# Patient Record
Sex: FEMALE | Race: WHITE
[De-identification: names, ages, dates, MRNs, and addresses within clinical notes are randomized per-mention and may not be internally consistent; named-entity substitution may affect disease eponyms.]

---

## 2018-09-02 ENCOUNTER — HOSPITAL ENCOUNTER (EMERGENCY)
Dept: HOSPITAL 75 - ER | Age: 63
Discharge: HOME | End: 2018-09-02
Payer: SELF-PAY

## 2018-09-02 VITALS — SYSTOLIC BLOOD PRESSURE: 119 MMHG | DIASTOLIC BLOOD PRESSURE: 67 MMHG

## 2018-09-02 VITALS — WEIGHT: 130 LBS | BODY MASS INDEX: 26.21 KG/M2 | HEIGHT: 59 IN

## 2018-09-02 DIAGNOSIS — Z90.89: ICD-10-CM

## 2018-09-02 DIAGNOSIS — Z90.710: ICD-10-CM

## 2018-09-02 DIAGNOSIS — Z88.0: ICD-10-CM

## 2018-09-02 DIAGNOSIS — Z87.01: ICD-10-CM

## 2018-09-02 DIAGNOSIS — F17.210: ICD-10-CM

## 2018-09-02 DIAGNOSIS — R10.31: Primary | ICD-10-CM

## 2018-09-02 DIAGNOSIS — Z98.890: ICD-10-CM

## 2018-09-02 DIAGNOSIS — G89.29: ICD-10-CM

## 2018-09-02 DIAGNOSIS — F41.9: ICD-10-CM

## 2018-09-02 DIAGNOSIS — Z87.19: ICD-10-CM

## 2018-09-02 LAB
ALBUMIN SERPL-MCNC: 4.6 GM/DL (ref 3.2–4.5)
ALP SERPL-CCNC: 92 U/L (ref 40–136)
ALT SERPL-CCNC: 18 U/L (ref 0–55)
BASOPHILS # BLD AUTO: 0 10^3/UL (ref 0–0.1)
BASOPHILS NFR BLD AUTO: 0 % (ref 0–10)
BILIRUB SERPL-MCNC: 0.6 MG/DL (ref 0.1–1)
BUN/CREAT SERPL: 16
CALCIUM SERPL-MCNC: 9.7 MG/DL (ref 8.5–10.1)
CHLORIDE SERPL-SCNC: 106 MMOL/L (ref 98–107)
CO2 SERPL-SCNC: 22 MMOL/L (ref 21–32)
CREAT SERPL-MCNC: 0.8 MG/DL (ref 0.6–1.3)
EOSINOPHIL # BLD AUTO: 0.4 10^3/UL (ref 0–0.3)
EOSINOPHIL NFR BLD AUTO: 7 % (ref 0–10)
ERYTHROCYTE [DISTWIDTH] IN BLOOD BY AUTOMATED COUNT: 12.9 % (ref 10–14.5)
GFR SERPLBLD BASED ON 1.73 SQ M-ARVRAT: > 60 ML/MIN
GLUCOSE SERPL-MCNC: 85 MG/DL (ref 70–105)
HCT VFR BLD CALC: 40 % (ref 35–52)
HGB BLD-MCNC: 13.8 G/DL (ref 11.5–16)
LYMPHOCYTES # BLD AUTO: 1.6 X 10^3 (ref 1–4)
LYMPHOCYTES NFR BLD AUTO: 29 % (ref 12–44)
MANUAL DIFFERENTIAL PERFORMED BLD QL: NO
MCH RBC QN AUTO: 33 PG (ref 25–34)
MCHC RBC AUTO-ENTMCNC: 34 G/DL (ref 32–36)
MCV RBC AUTO: 97 FL (ref 80–99)
MONOCYTES # BLD AUTO: 0.4 X 10^3 (ref 0–1)
MONOCYTES NFR BLD AUTO: 7 % (ref 0–12)
NEUTROPHILS # BLD AUTO: 3.3 X 10^3 (ref 1.8–7.8)
NEUTROPHILS NFR BLD AUTO: 57 % (ref 42–75)
PLATELET # BLD: 197 10^3/UL (ref 130–400)
PMV BLD AUTO: 9 FL (ref 7.4–10.4)
POTASSIUM SERPL-SCNC: 4.1 MMOL/L (ref 3.6–5)
PROT SERPL-MCNC: 7.6 GM/DL (ref 6.4–8.2)
RBC # BLD AUTO: 4.16 10^6/UL (ref 4.35–5.85)
SODIUM SERPL-SCNC: 137 MMOL/L (ref 135–145)
WBC # BLD AUTO: 5.7 10^3/UL (ref 4.3–11)

## 2018-09-02 PROCEDURE — 36415 COLL VENOUS BLD VENIPUNCTURE: CPT

## 2018-09-02 PROCEDURE — 74177 CT ABD & PELVIS W/CONTRAST: CPT

## 2018-09-02 PROCEDURE — 80053 COMPREHEN METABOLIC PANEL: CPT

## 2018-09-02 PROCEDURE — 85025 COMPLETE CBC W/AUTO DIFF WBC: CPT

## 2018-09-02 NOTE — XMS REPORT
[facility]

 Created on: 2018



Sofiya Singh

External Reference #: [id number]

: 1955

Sex: Female



Demographics







 Address  [address] [place]  [address]

 

 Preferred Language  Unknown

 

 Marital Status  Unknown

 

 Anglican Affiliation  Unknown

 

 Race  Unknown

 

 Ethnic Group  Unknown





Author







 Author  NAHUN SNYDER

 

 Organization  [facility]

 

 Address  [address] [place]  [address]



 

 Phone  [phone]







Care Team Providers







 Care Team Member Name  Role  Phone

 

 NAHUN SNYDER  Unavailable  [phone]







PROBLEMS







 Type  Condition  ICD9-CM Code  KCC05-VK Code  Onset Dates  Condition Status  
SNOMED Code

 

 Problem  ADHD (attention deficit hyperactivity disorder), inattentive type     
F90.0     Active  32684303

 

 Problem  Arthritis     M19.90     Active  8866237

 

 Problem  Positive skin test for tuberculosis     R76.11     Active  243485624

 

 Problem  Lumbago with sciatica, left side     M54.42     Active  752938470

 

 Problem  Excessive daytime sleepiness     G47.19     Active  491260900623

 

 Problem  Primary narcolepsy without cataplexy     G47.419     Active  
[id number]

 

 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 
    Active  [id number]







ALLERGIES

No Information



ENCOUNTERS







 Encounter  Location  Date  Diagnosis

 

 [place] [id number] [place] [address] 45059-
7151    Right lower quadrant abdominal pain R10.31 and Rash R21

 

 [place] [id number] [place] [address] 88519-
4035  14 Mar, 2018  ADHD (attention deficit hyperactivity disorder), 
inattentive type F90.0 ; Lumbago with sciatica, left side M54.42 and Arthritis 
M19.90

 

 [facility]  [address] [place] [address] 02328-
1846    ADHD (attention deficit hyperactivity disorder), 
inattentive type F90.0 and Lumbar neuritis M54.16

 

 [facility]  [id number] [place] [address] 30753-
9937     

 

 [place] [id number] [place] [address] 90752-
4188    Lumbar neuritis M54.16

 

 [facility]  [address] [place] [address] 95497-
3147  16 2018  Low back pain M54.5

 

 [facility]  [id number] [place] [address] 27133-
7798  11 2018  ADHD (attention deficit hyperactivity disorder), 
inattentive type F90.0

 

 [facility]  [id number] [place] [address] 36769-
6257  09 2018  Positive skin test for tuberculosis R76.11

 

 [facility]  [id number] [place] [address] 34270-
0024    Positive skin test for tuberculosis R76.11

 

 [facility]  [id number] [place] [address] 62957-
0370     

 

 [facility]  [id number] [place] [address] 82657-
3317    Lumbar neuritis M54.16

 

 [facility]  [id number] [place] [address] 28066-
2606    ADHD (attention deficit hyperactivity disorder), 
inattentive type F90.0

 

 [facility]  [address] [place] [address] 98679-
4666     

 

 [facility]  [address] [place] [address] 64523-
3777  20 Dec, 2017  Lumbar neuritis M54.16

 

 [facility]  [id number] [place] [address] 91570-
3680  15 Dec, 2017  ADHD (attention deficit hyperactivity disorder), 
inattentive type F90.0

 

 [facility]  [id number] [place] [address] 93560-
7890  12 Dec, 2017   

 

 [facility]  [id number] [place] [address] 78823-
8298  11 Dec, 2017   

 

 [facility]  [id number] [place] [address] 69347-
3005    Lumbar neuritis M54.16

 

 [facility]  [id number] [place] [address] 46607-
3327    ADHD (attention deficit hyperactivity disorder), 
inattentive type F90.0 and Primary narcolepsy without cataplexy G47.419

 

 [facility]  [id number] [place] [address] 63869-
8044  10 Nov, 2017   

 

 [facility]  [id number] [place] [address] 80145-
5462     

 

 [facility]  [id number] [place] [address] 26718-
7826  23 Oct, 2017  Lumbar neuritis M54.16 and ADHD (attention deficit 
hyperactivity disorder), inattentive type F90.0

 

 [facility]  [id number] [place] [address] 61955-
9872  12 Oct, 2017   

 

 [facility]  [id number] [place] [address] 28895-
1354  26 Sep, 2017  Lumbar neuritis M54.16 and ADHD (attention deficit 
hyperactivity disorder), inattentive type F90.0

 

 [facility]  [id number] [place] [address] 33704-
3591  19 Sep, 2017   

 

 [facility]  [id number] [place] [address] 76858-
9518  31 Aug, 2017  ADHD (attention deficit hyperactivity disorder), 
inattentive type F90.0 and Lumbar neuritis M54.16

 

 [facility]  [id number] [place] [address] 62218-
5687  24 Aug, 2017  Lumbar neuritis M54.16

 

 [facility]  [id number] [place] [address] 37452-
1331  23 Aug, 2017   

 

 [facility]  [id number] [place] [address] 03077-
0109  02 Aug, 2017  ADHD (attention deficit hyperactivity disorder), 
inattentive type F90.0 and Lumbar neuritis M54.16

 

 [facility]  [address] [address]-
9295  02 Aug, 2017   

 

 [facility]  [address] [address]-
5445     

 

 [facility]  [address] [address]-
4743     

 

 [facility]  [id number] [place] [address] 33232-
6069     

 

 [facility]  [id number] [place] [address] 01664-
5935     

 

 [facility]  [id number] [place] [address] 43376-
0779    ADHD (attention deficit hyperactivity disorder), 
inattentive type F90.0 and Lumbar neuritis M54.16

 

 [facility]  [id number] [place] [address] 98166-
2991     

 

 [facility]  [address] [address]-
1636     

 

 [facility]  [id number] [place] [address] 56778-
9095    Lumbar neuritis M54.16 and ADHD (attention deficit 
hyperactivity disorder), inattentive type F90.0

 

 [facility]  [address] [address]-
2223     

 

 [facility]  [address] [address]-
7253  10 May, 2017  Lumbar neuritis M54.16 and ADHD (attention deficit 
hyperactivity disorder), inattentive type F90.0

 

 [facility]  [address] [address]-
6946  03 May, 2017   

 

 [facility]  [address] [address]-
4751  01 May, 2017   

 

 [facility]  [address] [place] [address] 11438-
0850    Narcolepsy due to underlying condition without cataplexy 
G47.429 and Lumbago with sciatica, left side M54.42

 

 [facility]  [id number] [place] [address] 15720-
0501  14 2017  Lumbar neuritis M54.16 and ADHD (attention deficit 
hyperactivity disorder), inattentive type F90.0

 

 [facility]  [id number] [place] [address] 28082-
4145  31 Mar, 2017   

 

 [facility]  [id number] [place] [address] 07439-
5483  15 Mar, 2017  Lumbar neuritis M54.16 and ADHD (attention deficit 
hyperactivity disorder), inattentive type F90.0

 

 [facility]  [id number] [place] [address] 29482-
0593  15 Mar, 2017   

 

 [facility]  [id number] [place] [address] 22074-
6319  06 Mar, 2017   

 

 [facility]  [id number] [place] [address] 02506-
3595  15 2017  ADHD (attention deficit hyperactivity disorder), 
inattentive type F90.0

 

 [facility]  [id number] [place] [address] 76023-
5233  15 2017  Lumbar neuritis M54.16

 

 [facility]  [id number] [place] [address] 10287-
3754  08 2017   

 

 [facility]  [id number] [place] [address] 66261-
6470  07 2017   

 

 [facility]  [id number] [place] [address] 07985-
0994    Attention deficit hyperactivity disorder (ADHD), 
predominantly inattentive type F90.0

 

 [facility]  [id number] [place] [address] 08866-
3188     

 

 [facility]  [address] [place] [address]-
3740  10 Niall, 2017   

 

 [facility]  [id number] [place] [address] 46033-
4473     

 

 [facility]  [id number] [place] [address] 41031-
2489  22 Dec, 2016  Attention deficit hyperactivity disorder (ADHD), 
predominantly inattentive type F90.0

 

 [facility]  [id number] [place] [address] 06103-
1893  12 Dec, 2016   

 

 [facility]  [address] [place] [address] 70657-
6029  07 Dec, 2016   

 

 [facility]  [id number] [place] [address] 85344-
0182  05 Dec, 2016   

 

 [facility]  [id number] [place] [address] 96396-
3213  05 Dec, 2016  Low TSH level R94.6

 

 [facility]  [address] [address]-
0785  05 Dec, 2016   

 

 [facility]  [address] [place] [address] 02695-
0264  02 Dec, 2016   

 

 [facility]  [id number] [place] [address] 42583-
4557  10 Nov, 2016   

 

 [facility]  [address] [address]-
9352  10 Nov, 2016   

 

 [facility]  [address] [address]-
8411     

 

 [facility]  [address], [place] [address]-
6684  18 Oct, 2016  Low TSH level R94.6

 

 [facility]  [id number] [place] [address] 62497-
6202  17 Oct, 2016  Excessive daytime sleepiness G47.19 and ADHD (attention 
deficit hyperactivity disorder), inattentive type F90.0

 

 [facility]  [id number] [address]-
2832  13 Oct, 2016   

 

 [facility]  [address] [facility] [id number] [place] [address]-
5449  12 Oct, 2016   

 

 [facility] [place] [facility]  [address] [facility] [id number] [place] [address]-
1812  03 Oct, 2016   

 

 [facility]  [address] [facility] [id number] [place] [address]-
4624  28 Sep, 2016   

 

 [facility] [place] [facility]  [address] [facility] [id number] [place] [address]-
6591  14 Sep, 2016   

 

 [facility]  [address] [facility] [id number] [place] [address], [place] [address]-
1618  01 Sep, 2016   

 

 [facility] [place] [facility]  [id number] [address]-
1529  17 Aug, 2016   

 

 [facility]  [address] [place] [address], [place] [address]-
0061  04 Aug, 2016   

 

 [facility]  [address] [facility] [id number] [place] [address]-
0156  03 Aug, 2016   

 

 [facility]  [address] [place] [address], [place] [address]-
1968    Lumbar neuritis M54.16

 

 [facility]  [address], [place] [address]-
1753     

 

 [facility] [place] [facility]  [address] [address]-
6446     

 

 [facility] [place] [facility]  [address] [facility] [id number] [address]-
4484    Lumbar neuritis M54.16

 

 [facility]  [address] [facility] [id number] [place] [address]-
0565     

 

 [facility] [place] [facility]  [id number] [address]-
4519     

 

 [facility] [place] [facility]  [address] [address]-
5578  26 May, 2016  Lumbar neuritis M54.16

 

 [facility]  [address] [address]-
4270  25 May, 2016   

 

 [facility]  [id number] [address]-
0726  10 May, 2016   

 

 [facility]  [address] [place] [address] 90082-
5760    Lumbar neuritis M54.16

 

 [facility]  [address], [place] [address]-
8505     

 

 [facility]  [id number] [place] [address] 37932-
4479     

 

 [facility]  [address] [place] [address] 03850-
4082  23 Mar, 2016   

 

 [facility]  [id number] [place] [address] 58483-
9480  14 Mar, 2016   

 

 [facility]  [address] [address]-
3951  14 Mar, 2016   

 

 [facility]  [address] [place] [address] 67111-
1894  11 Mar, 2016   

 

 [facility]  [address], [place] [address]-
3224  24 2016   

 

 [facility]  [address] [address]-
9354    Inguinal hernia, right K40.90

 

 [facility]  [address] [address]-
4135  17 2016   

 

 [facility]  [address] [place] [address] 41659-
2143  15 2016  Low back pain M54.5 and Sciatica, unspecified side M54.30

 

 [facility]  [address], [place] [address]-
2747     

 

 [facility] [place] [facility]  [address] [facility] [id number] [address]-
2632     

 

 [facility] [place] [facility]  [address] [facility] [id number] [place] [address], [place] [address]-
5090  30 Dec, 2015   

 

 [facility] [place] [facility]  [address] [facility] [id number] [place] [address]-
1172  28 Dec, 2015   

 

 [facility] [place] [facility]  [address] [facility] [id number] [place] [address], [place] [address]-
4558  02 Dec, 2015   

 

 [facility] [place] [facility]  [address] [facility] [id number] [place] [address], [place] [address]-
1875     

 

 [facility] [place] [facility]  [address] [facility] [id number] [place] [address] 15627-
4049     

 

 [facility]  [id number] [place] [address], [place] [address]-
0098     

 

 [facility] [place] [facility]  [id number] [place] [address] 16288-
3376     

 

 [facility] [place] [facility]  [address], [place] [address]-
5620  26 Oct, 2015   

 

 [facility]  [address] [place] [address] 67274-
6751  22 Oct, 2015  Encounter for immunization Z23

 

 [facility]  [id number] [address]-
7978  07 Oct, 2015   

 

 [facility] [place] [facility]  [address] [address]-
9500  05 Oct, 2015   

 

 [facility] [place] [facility]  [address] [facility] [id number] [address]-
7780  09 Sep, 2015   

 

 [facility] [place] [facility]  [id number] [address]-
8567  08 Sep, 2015   

 

 [facility] [place] [facility]  [address], [place] [address]-
8141  19 Aug, 2015  Lumbago 724.2

 

 [facility] [place] [facility]  [address] [id number] [place] [address]-
5592  12 Aug, 2015   

 

 [facility] [place] [facility]  [address] [id number] [place] [address]-
5243    Lumbago 724.2

 

 [facility] [place] [facility]  [address] [id number] [place] [address]-
6885     

 

 [facility] [place] [facility]  [address] [id number] [place] [address]-
5352     

 

 [facility] [place] [facility]  [address] [id number] [place] [address]-
4104     

 

 [facility] [place] [facility]  [address] [id number] [place] [address]-
1574     

 

 [facility] [place] [facility]  [address] [id number] [place] [address]-
0947     

 

 [facility] [place] [facility]  [address] [id number] [place] [address]-
5154  22 May, 2015   

 

 [facility] [place] [facility]  [address] [id number] [place] [address]-
8226     

 

 [facility] [place] [facility]  [address] [id number] [place] [address]-
0292     

 

 [facility] [place] [facility]  [address] [id number] [place] [address]-
5224  30 Mar, 2015   

 

 [facility] [place] [facility]  [address] [id number] [place] [address]-
7424  30 Mar, 2015   

 

 [facility] [place] [facility]  [address] [id number] [place] [address]-
6536  02 Mar, 2015   

 

 [facility] [place] [facility]  [address] [id number] [place]-
9265  02 Mar, 2015   

 

 [facility] [place] [facility]  [address] [id number] [place] [address]-
8456     

 

 [facility] [place] [facility]  [address] [id number] [place] [address]-
9840  ,    

 

 [facility] [place] [facility]  [address] [id number] [place] [address]-
4805     

 

 [facility] [place] [facility]  [address] [id number] [place] [address]-
5906     

 

 [facility] [place] [facility]  [address] [id number] [place] [address]-
1311     

 

 [facility] [place] [facility]  [address] [id number] [place] [address]-
7124     

 

 [facility] [place] [facility]  [address] [id number] [place]
0070  31 Dec, 2014   

 

 [facility] [place] [facility]  [address] [id number] [place] [address]-
1658  31 Dec, 2014   

 

 [facility] [place] [facility]  [address] [id number] [place] [address]-
8543  22 Dec, 2014   

 

 [facility] [place] [facility]  [address] [id number] [place] [address]-
3648  22 Dec, 2014   

 

 [facility] [place] [facility]  [address] [id number] [place] [address]-
7683  08 Dec, 2014   

 

 [facility] [place] [facility]  [address] [facility] [id number] [place] [address]-
6243  08 Dec, 2014   

 

 [facility] [place] [facility]  [address] [id number] [place] [address]-
6534  10 Nov, 2014   

 

 [facility] [place] [facility]  [address] [id number] [place] [address]-
8032  10 Nov, 2014   

 

 [facility] [place] [facility]  [address] [id number] [place] [address]-
6239  13 Oct, 2014   

 

 [facility] [place] [facility]  [address] [id number] [place]
6810  13 Oct, 2014   

 

 [facility] [place] [facility]  [address] [id number] [place] [address]-
1091  01 Oct, 2014   

 

 [facility] [place] [facility]  [address] [id number] [place] [address]-
4205  01 Oct, 2014   

 

 [facility] [place] [facility]  [address] [id number] [place]-
7885  15 Sep, 2014   

 

 [facility] [place] [facility]  [address] [id number] [place] [address]-
3587  15 Sep, 2014   

 

 [facility] [place] [facility]  [address] [id number] [place] [address]-
1735  18 Aug, 2014   

 

 [facility] [place] [facility]  [address] [id number] [place] [address]-
9236  18 Aug, 2014   

 

 [facility] [place] [facility]  [address] [id number] [place] [address]-
3437  18 Aug, 2014   

 

 [facility] [place] [facility]  [address] [id number] [place] [address]-
1797  18 Aug, 2014   

 

 [facility] [place] [facility]  [address] [id number] [place] [address]-
0565     

 

 [facility] [place] [facility]  [address] [id number] [place] [address]-
0194     

 

 [facility] [place] [facility]  [address] [id number] [place] [address]-
7876     

 

 [facility] [place] [facility]  [address] [id number] [place] [address]-
1831     

 

 [facility] [place] [facility]  [address] [id number] [place] [address]-
4534     

 

 [facility] [place] [facility]  [address] [id number] [place] [address]-
6918     

 

 [facility] [place] [facility]  [address] [id number] [place] [address]-
0781  30 May, 2014   

 

 [facility] [place] [facility]  [address] [id number] [place] [address]-
5470  28 May, 2014   

 

 [facility] [place] [facility]  [address] [id number] [place] [address]-
8699  28 May, 2014   

 

 [facility] [place] [facility]  [address] [id number] [place] [address]-
4402     

 

 [facility] [place] [facility]  [address] [id number] [place] [address]-
5317     

 

 [facility] [place] [facility]  [address] [id number] [place] [address]-
5049     

 

 [facility] [place] [facility]  [address] [id number] [place] [address]-
0687     

 

 [facility] [place] [facility]  [address] [id number] [place] [address]-
4072     

 

 [facility] [place] [facility]  [address] [id number] [place] [address]-
9371     

 

 [facility] [place] [facility]  [address] [id number] [place] [address]-
6030     

 

 [facility] [place] [facility]  [address] [id number] [place] [address]-
4149     

 

 [facility] [place] [facility]  [address] [id number] [place] [address]-
9676     

 

 [facility] [place] [facility]  [address] [id number] [place] [address]-
0698     

 

 [facility] [place] [facility]  [address] [id number] [place] [address]-
8152     

 

 [facility] [place] [facility]  [address] [id number] [place] [address]-
4492  28 Mar, 2014   

 

 [facility] [place] [facility]  [address] [id number] [place] [address]-
4464  27 Mar, 2014   

 

 [facility] [place] [facility]  [address] [id number] [place] [address]-
1743  27 Mar, 2014   

 

 [facility] [place] [facility]  [address] [id number] [place] [address]-
5369  26 Mar, 2014   

 

 [facility] [place] [facility]  [address] [id number] [place] [address]-
5874  26 Mar, 2014   

 

 [facility] [place] [facility]  [address] [id number] [place] [address]-
6268     

 

 [facility] [place] [facility]  [address] [id number] [place] [address]-
7906     

 

 [facility] [place] [facility]  [address] [id number] [place] [address]-
1456     

 

 [facility] [place] [facility]  [address] [id number] [place] [address]-
2163     

 

 [facility] [place] [facility]  [address] [id number] [place] [address]-
4092     

 

 [facility] [place] [facility]  [address] [id number] [place] [address]-
1480     

 

 [facility] [place] [facility]  [address] [id number] [place] [address]-
5724     

 

 [facility] [place] [facility]  [address] [address] [id number] [place] [address]     

 

 [facility] [place] [facility]  [address] [facility] [id number] [place] [address]-
[address]     

 

 [facility] [place] [facility]  [address] [id number] [place] [address]-
[address]     

 

 [facility] [place] [facility]  [address] [id number] [place] [address]-
[address]     

 

 [facility] [place] [facility]  [address] [id number] [place] [address]-
[address]     

 

 [facility] [place] [facility]  [address] [id number] [place] [address]-
[address]     

 

 [facility] [place] [facility]  [address] [id number] [place] [address]-
1378  10 Dec, 2013   

 

 [facility] [place] [facility]  [address] [id number] [place] [address]-
[address]  10 Dec, 2013   

 

 [facility]  [address] [facility] [id number] [place] [address]-
[address]     

 

 [facility]  [address] [facility] [id number] [place] [address]-
[address]46     







IMMUNIZATIONS

No Known Immunizations



SOCIAL HISTORY

Never Assessed



REASON FOR VISIT

Controlled Med Refill 1/15/18



PLAN OF CARE





VITAL SIGNS





MEDICATIONS







 Medication  Instructions  Dosage  Frequency  Start Date  End Date  Duration  
Status

 

 Adderall XR 30 MG  Orally Once a day  1 capsule in the morning  24h       28 days  Active







RESULTS

No Results



PROCEDURES

No Known procedures



INSTRUCTIONS





MEDICATIONS ADMINISTERED

No Known Medications



MEDICAL (GENERAL) HISTORY







 Type  Description  Date

 

 Medical History  narcolepsy   

 

 Surgical History  Gallbladder removed  2015

 

 Surgical History  Hernia repair  2016

## 2018-09-02 NOTE — XMS REPORT
[facility]

 Created on: 2018



Sofiya Singh

External Reference #: [id number]

: 1955

Sex: Female



Demographics







 Address  [address] [place]  [address]

 

 Preferred Language  Unknown

 

 Marital Status  Unknown

 

 Religion Affiliation  Unknown

 

 Race  Unknown

 

 Ethnic Group  Unknown





Author







 Author  NAHUN SNYDER

 

 Organization  [facility]

 

 Address  [address] [place]  [address]



 

 Phone  [phone]







Care Team Providers







 Care Team Member Name  Role  Phone

 

 NAHUN SNYDER  Unavailable  [phone]







PROBLEMS







 Type  Condition  ICD9-CM Code  TVS14-VW Code  Onset Dates  Condition Status  
SNOMED Code

 

 Problem  ADHD (attention deficit hyperactivity disorder), inattentive type     
F90.0     Active  18539231

 

 Problem  Arthritis     M19.90     Active  8769652

 

 Problem  Positive skin test for tuberculosis     R76.11     Active  754172608

 

 Problem  Lumbago with sciatica, left side     M54.42     Active  164509420

 

 Problem  Excessive daytime sleepiness     G47.19     Active  250433248526

 

 Problem  Primary narcolepsy without cataplexy     G47.419     Active  
[id number]

 

 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 
    Active  [id number]







ALLERGIES

No Information



ENCOUNTERS







 Encounter  Location  Date  Diagnosis

 

 [place] [id number] [place] [address] 61535-
6743  04 May, 2018   

 

 [id number] [place] [address] 73997-
0666  14 Mar, 2018  ADHD (attention deficit hyperactivity disorder), 
inattentive type F90.0 ; Lumbago with sciatica, left side M54.42 and Arthritis 
M19.90

 

 [facility]  [id number] [place] [address] 73431-
0309    ADHD (attention deficit hyperactivity disorder), 
inattentive type F90.0 and Lumbar neuritis M54.16

 

 [id number] [place] [address] 33138-
9829     

 

 [id number] [place] [address] 35295-
4538    Lumbar neuritis M54.16

 

 [address] [place] [address] 41037-
5829  16 2018  Low back pain M54.5

 

 [facility]  [id number] [place] [address] 28327-
6317    ADHD (attention deficit hyperactivity disorder), 
inattentive type F90.0

 

 [facility]  [id number] [place] [address] 39169-
6937    Positive skin test for tuberculosis R76.11

 

 [facility]  [id number] [place] [address] 72007-
5597    Positive skin test for tuberculosis R76.11

 

 [facility]  [id number] [place] [address] 67010-
8890     

 

 [facility]  [id number] [place] [address] 32208-
3887    Lumbar neuritis M54.16

 

 [facility]  [id number] [place] [address] 21352-
9047    ADHD (attention deficit hyperactivity disorder), 
inattentive type F90.0

 

 [facility]  [id number] [place] [address] 77862-
3429     

 

 [facility]  [id number] [place] [address] 24278-
4452  20 Dec, 2017  Lumbar neuritis M54.16

 

 [facility]  [address] [place] [address] 87003-
2896  15 Dec, 2017  ADHD (attention deficit hyperactivity disorder), 
inattentive type F90.0

 

 [facility]  [id number] [place] [address] 67891-
5456  12 Dec, 2017   

 

 [facility]  [id number] [place] [address] 37707-
7672  11 Dec, 2017   

 

 [facility]  [id number] [place] [address] 18121-
6424    Lumbar neuritis M54.16

 

 [facility]  [id number] [place] [address] 05535-
1286    ADHD (attention deficit hyperactivity disorder), 
inattentive type F90.0 and Primary narcolepsy without cataplexy G47.419

 

 [facility]  [id number] [place] [address] 43284-
7275  10 Nov, 2017   

 

 [facility]  [id number] [place] [address] 08148-
7451     

 

 [facility]  [id number] [place] [address] 22846-
3246  23 Oct, 2017  Lumbar neuritis M54.16 and ADHD (attention deficit 
hyperactivity disorder), inattentive type F90.0

 

 [facility]  [id number] [place] [address] 80971-
2526  12 Oct, 2017   

 

 [facility]  [id number] [place] [address] 34959-
2241  26 Sep, 2017  Lumbar neuritis M54.16 and ADHD (attention deficit 
hyperactivity disorder), inattentive type F90.0

 

 [facility]  [id number] [place] [address] 75530-
3597  19 Sep, 2017   

 

 [facility]  [id number] [place] [address] 68326-
4948  31 Aug, 2017  ADHD (attention deficit hyperactivity disorder), 
inattentive type F90.0 and Lumbar neuritis M54.16

 

 [facility]  [id number] [place] [address] 06382-
9051  24 Aug, 2017  Lumbar neuritis M54.16

 

 [facility]  [id number] [place] [address] 00234-
5952  23 Aug, 2017   

 

 [facility]  [id number] [place] [address] 70016-
8621  02 Aug, 2017  ADHD (attention deficit hyperactivity disorder), 
inattentive type F90.0 and Lumbar neuritis M54.16

 

 [facility]  [address] [address] [address]-
6604  02 Aug, 2017   

 

 [facility]  [address] [address]-
8305     

 

 [facility]  [address] [address]706[place]-
3967     

 

 [facility]  [address] [address]-
1984     

 

 [facility]  [address] [address]-
8402     

 

 [facility]  [id number] [place] [address] 16254-
7041    ADHD (attention deficit hyperactivity disorder), 
inattentive type F90.0 and Lumbar neuritis M54.16

 

 [facility]  [id number] [address]-
7095     

 

 [facility]  [id number] [address]-
3184     

 

 [facility]  [address] [place] [address] 79758-
2631    Lumbar neuritis M54.16 and ADHD (attention deficit 
hyperactivity disorder), inattentive type F90.0

 

 [facility]  [address] [address]-
6684     

 

 [facility]  [address] [address]-
4598  10 May, 2017  Lumbar neuritis M54.16 and ADHD (attention deficit 
hyperactivity disorder), inattentive type F90.0

 

 [facility]  [address] [address]-
4236  03 May, 2017   

 

 [facility]  [address] [address]-
1197  01 May, 2017   

 

 [facility]  [address] [address]-
6054    Narcolepsy due to underlying condition without cataplexy 
G47.429 and Lumbago with sciatica, left side M54.42

 

 [facility]  [id number] [place] [address] 91632-
2275    Lumbar neuritis M54.16 and ADHD (attention deficit 
hyperactivity disorder), inattentive type F90.0

 

 [facility]  [id number] [place] [address] 16681-
6595  31 Mar, 2017   

 

 [facility]  [id number] [place] [address] 87813-
3004  15 Mar, 2017  Lumbar neuritis M54.16 and ADHD (attention deficit 
hyperactivity disorder), inattentive type F90.0

 

 [facility]  [id number] [place] [address] 27174-
2780  15 Mar, 2017   

 

 [facility]  [id number] [place] [address] 60344-
1099  06 Mar, 2017   

 

 [facility]  [id number] [place] [address] 69492-
1551  15 Feb, 2017  ADHD (attention deficit hyperactivity disorder), 
inattentive type F90.0

 

 [facility]  [id number] [place] [address] 95492-
3197  15 Feb, 2017  Lumbar neuritis M54.16

 

 [facility]  [id number] [place] [address] 69456-
0515  08 2017   

 

 [facility]  [id number] [place] [address] 55083-
1941  07 2017   

 

 [facility]  [id number] [place] [address] 24167-
8896    Attention deficit hyperactivity disorder (ADHD), 
predominantly inattentive type F90.0

 

 [facility]  [id number] [place] [address] 06838-
4377     

 

 [facility]  [id number] [place] [address] 27004-
5563  10 Niall, 2017   

 

 [facility]  [address] [place] [address] 35159-
2922     

 

 [facility]  [id number] [place] [address] 94100-
2321  22 Dec, 2016  Attention deficit hyperactivity disorder (ADHD), 
predominantly inattentive type F90.0

 

 [facility]  [id number] [place] [address] 64044-
5763  12 Dec, 2016   

 

 [facility]  [id number] [place] [address] 01949-
3111  07 Dec, 2016   

 

 [facility]  [id number] [place] [address] 12678-
1005  05 Dec, 2016   

 

 [facility]  [id number] [place] [address] 68944-
2060  05 Dec, 2016  Low TSH level R94.6

 

 [facility]  [id number] [place] [address] 52677-
2771  05 Dec, 2016   

 

 [facility]  [id number] [place] [address] 93006-
6959  02 Dec, 2016   

 

 [facility]  [id number] [place] [address] 85521-
8148  10 Nov, 2016   

 

 [facility]  [id number] [place] [address] 85566-
7333  10 Nov, 2016   

 

 [facility]  [id number] [place] [address] 64133-
8164     

 

 [facility]  [address] [place] [address] 90722-
9702  18 Oct, 2016  Low TSH level R94.6

 

 [facility]  [id number] [place] [address] 05960-
6816  17 Oct, 2016  Excessive daytime sleepiness G47.19 and ADHD (attention 
deficit hyperactivity disorder), inattentive type F90.0

 

 [facility]  [id number] [place] [address] 32974-
3987  13 Oct, 2016   

 

 [facility]  [address] [facility] [address] [id number] [place] [address]-
2706  12 Oct, 2016   

 

 [facility] [place] [facility]  [address] [facility] [address] [id number] [place] [address]-
3828  03 Oct, 2016   

 

 [facility]  [address] [facility] [address] [id number] [place] [address]-
4792  28 Sep, 2016   

 

 [facility] [place] [facility]  [address] [facility] [id number] [place] [address], [place] [address]-
0312  14 Sep, 2016   

 

 [facility] [place] [facility]  [address] [facility] [address] [id number] [place] [address]-
1385  01 Sep, 2016   

 

 [facility] [place] [facility]  [id number] [place] [address], [place] [address]-
4492  17 Aug, 2016   

 

 [facility] [place] [facility]  [id number] [place] [address], [place] [address]-
0771  04 Aug, 2016   

 

 [facility] [place] [facility]  [address] [facility] [id number] [place] [address], [place] [address]-
9827  03 Aug, 2016   

 

 [facility]  [id number] [address]-
0499    Lumbar neuritis M54.16

 

 [facility]  [id number] [address]-
9011     

 

 [facility] [place] [facility]  [address] [address]-
1824     

 

 [facility] [place] [facility]  [address] [facility] [address] [id number] [address]-
5662    Lumbar neuritis M54.16

 

 [facility] [place] [facility]  [address] [facility] [address] [id number] [place]-
6260     

 

 [facility] [place] [facility]  [address] [facility] [address] [id number] [place] [address]-
8216     

 

 [facility] [place] [facility]  [address] [address]-
4297  26 May, 2016  Lumbar neuritis M54.16

 

 [facility]  [address] [place] [address]-
3488  25 May, 2016   

 

 [facility]  [id number] [place] [address] 11084-
7527  10 May, 2016   

 

 [facility]  [id number] [place] [address] 44687-
0084    Lumbar neuritis M54.16

 

 [facility]  [id number] [place] [address] 58613-
4221     

 

 [facility]  [id number] [place] [address] 51481-
8287     

 

 [facility]  [id number] [place] [address] 56377-
1169  23 Mar, 2016   

 

 [facility]  [id number] [place] [address] 49233-
9062  14 Mar, 2016   

 

 [facility]  [id number] [place] [address] 87051-
6439  14 Mar, 2016   

 

 [facility]  [id number] [place] [address] 82112-
7892  11 Mar, 2016   

 

 [facility]  [id number] [address]-
9877  24 2016   

 

 [facility]  [id number] [place] [address] 43524-
0979  22 2016  Inguinal hernia, right K40.90

 

 [facility]  [address] [address]-
0883  17 2016   

 

 [facility]  [id number] [place] [address] 45712-
9187  15 2016  Low back pain M54.5 and Sciatica, unspecified side M54.30

 

 [facility]  [address], [place] [address]-
8390     

 

 [facility]  [address] [id number] [place] [address]-
0235     

 

 [facility]  [address] [facility] [id number] [place] [address], [place] [address]-
2302  30 Dec, 2015   

 

 [facility] [place] [facility]  [address] [facility] [address] [id number] [place] [address]-
3087  28 Dec, 2015   

 

 [facility]  [address] [facility] [id number] [place] [address], [place] [address]-
2862  02 Dec, 2015   

 

 [facility] [place] [facility]  [address] [facility] [address] [id number] [place] [address]-
4775     

 

 [facility]  [id number] [place] [address], [place] [address]-
8383     

 

 [facility]  [id number] [place] [address], [place] [address]-
0393     

 

 [facility]  [id number] [place] [address], [place] [address]-
1461     

 

 [facility]  [id number] [place] [address] 21211-
2306  26 Oct, 2015   

 

 [facility]  [id number] [place] [address] 78804-
9095  22 Oct, 2015  Encounter for immunization Z23

 

 [facility]  [address] [address]-
3594  07 Oct, 2015   

 

 [facility]  [id number], [place] [address]-
3758  05 Oct, 2015   

 

 [facility]  [id number] [address]-
6585  09 Sep, 2015   

 

 [facility]  [id number] [address]-
0755  08 Sep, 2015   

 

 [facility]  [id number], [place] [address]-
6142  19 Aug, 2015  Lumbago 724.2

 

 [facility]  [id number] [place] [address]-
3713  12 Aug, 2015   

 

 [facility] [place] [facility]  [address] [id number] [place] [address]-
1657    Lumbago 724.2

 

 [facility] [place] [facility]  [address] [id number] [place] [address]-
1516     

 

 [facility] [place] [facility]  [address] [id number] [place] [address]-
4044     

 

 [facility] [place] [facility]  [address] [id number] [place] [address]-
9077     

 

 [facility] [place] [facility]  [address] [id number] [place] [address]-
1052     

 

 [facility] [place] [facility]  [address] [id number] [place] [address]-
8923     

 

 [facility] [place] [facility]  [address] [id number] [place] [address]-
0458  22 May, 2015   

 

 [facility] [place] [facility]  [address] [id number] [place] [address]-
8268     

 

 [facility] [place] [facility]  [address] [id number] [place] [address]-
4351     

 

 [facility] [place] [facility]  [address] [facility] [address] [id number] [place] [address]-
5351  30 Mar, 2015   

 

 [facility] [place] [facility]  [address] [id number] [place] [address]-
2419  30 Mar, 2015   

 

 [facility] [place] [facility]  [address] [id number] [place] [address]-
0572  02 Mar, 2015   

 

 [facility] [place] [facility]  [address] [id number] [place] [address]-
9226  02 Mar, 2015   

 

 [facility] [place] [facility]  [address] [id number] [place] [address]-
9396     

 

 [facility] [place] [facility]  [address] [id number] [place] [address]-
7334     

 

 [facility] [place] [facility]  [address] [id number] [place] [address]-
0284     

 

 [facility] [place] [facility]  [address] [id number] [place] [address]-
8036     

 

 [facility] [place] [facility]  [address] [facility] [address] [id number] [place] [address]-
8647     

 

 [facility] [place] [facility]  [address] [id number] [place] [address]-
6986     

 

 [facility] [place] [facility]  [address] [id number] [place] [address]-
4406  31 Dec, 2014   

 

 [facility] [place] [facility]  [address] [id number] [place] [address]-
3860  31 Dec, 2014   

 

 [facility] [place] [facility]  [address] [id number] [place] [address]-
0509  22 Dec, 2014   

 

 [facility] [place] [facility]  [address] [id number] [place] [address]-
2621  22 Dec, 2014   

 

 [facility] [place] [facility]  [address] [id number] [place] [address]-
9909  08 Dec, 2014   

 

 [facility] [place] [facility]  [address] [facility] [address] [id number] [place] [address]-
9627  08 Dec, 2014   

 

 [facility] [place] [facility]  [address] [id number] [place] [address]-
9865  10 Nov, 2014   

 

 [facility] [place] [facility]  [address] [id number] [address]-
2437  10 Nov, 2014   

 

 [facility] [place] [facility]  [address] [id number] [place] [address]-
0762  13 Oct, 2014   

 

 [facility] [place] [facility]  [address] [id number] [place] [address]-
9642  13 Oct, 2014   

 

 [facility] [place] [facility]  [address] [facility] [address] [id number] [place] [address]-
9278  01 Oct, 2014   

 

 [facility] [place] [facility]  [address] [id number] [place] [address]-
8363  01 Oct, 2014   

 

 [facility] [place] [facility]  [address] [id number] [place] [address]-
5636  15 Sep, 2014   

 

 [facility] [place] [facility]  [address] [id number] [place]
3486  15 Sep, 2014   

 

 [facility] [place] [facility]  [address] [id number] [place] [address]-
8397  18 Aug, 2014   

 

 [facility] [place] [facility]  [address] [id number] [place] [address]-
5400  18 Aug, 2014   

 

 [facility] [place] [facility]  [address] [id number] [place] [address]-
0409  18 Aug, 2014   

 

 [facility] [place] [facility]  [address] [id number] [place] [address]-
4910  18 Aug, 2014   

 

 [facility] [place] [facility]  [address] [id number] [place] [address]-
8066     

 

 [facility] [place] [facility]  [address] [id number] [place] [address]-
1839     

 

 [facility] [place] [facility]  [address] [id number] [place] [address]-
1318     

 

 [facility] [place] [facility]  [address] [id number] [place] [address]-
8761     

 

 [facility] [place] [facility]  [address] [id number] [place] [address]-
9089     

 

 [facility] [place] [facility]  [address] [id number] [place] [address]-
4127     

 

 [facility] [place] [facility]  [address] [id number] [place] [address]-
4046  30 May, 2014   

 

 [facility] [place] [facility]  [address] [id number] [place] [address]-
0329  28 May, 2014   

 

 [facility] [place] [facility]  [address] [id number] [place] [address]-
8232  28 May, 2014   

 

 [facility] [place] [facility]  [address] [id number] [place] [address]-
3819     

 

 [facility] [place] [facility]  [address] [id number] [place] [address]-
0661     

 

 [facility] [place] [facility]  [address] [id number] [place] [address]-
8916     

 

 [facility] [place] [facility]  [address] [id number] [place] [address]-
5724     

 

 [facility] [place] [facility]  [address] [id number] [place] [address]-
2022     

 

 [facility] [place] [facility]  [address] [id number] [place] [address]-
4783     

 

 [facility] [place] [facility]  [address] [id number] [place] [address]-
7059     

 

 [facility] [place] [facility]  [address] [id number] [place] [address]-
3327     

 

 [facility] [place] [facility]  [address] [id number] [place] [address]-
7[address]     

 

 [facility] [place] [facility]  [address] [id number] [place] [address]-
2436     

 

 [facility] [place] [facility]  [address] [id number] [place] [address]-
0524     

 

 [facility] [place] [facility]  [address] [id number] [place] [address]-
2851  28 Mar, 2014   

 

 [facility] [place] [facility]  [address] [id number] [place] [address]-
8510  27 Mar, 2014   

 

 [facility] [place] [facility]  [address] [id number] [place] [address]-
7165  27 Mar, 2014   

 

 [facility] [place] [facility]  [address] [id number] [place] [address]-
7375  26 Mar, 2014   

 

 [facility] [place] [facility]  [address] [id number] [place] [address]-
5671  26 Mar, 2014   

 

 [facility] [place] [facility]  [address] [id number] [place] [address]-
2956     

 

 [facility] [place] [facility]  [address] [id number] [place] [address]-
7066     

 

 [facility] [place] [facility]  [address] [id number] [place] [address]-
5166     

 

 [facility] [place] [facility]  [address] [id number] [place] [address]-
2728     

 

 [facility] [place] [facility]  [address] [id number] [place] [address]-
8456     

 

 [facility] [place] [facility]  [address] [id number] [place] [address]-
8116     

 

 [facility] [place] [facility]  [address] [id number] [place] [address]-
2132     

 

 [facility] [place] [facility]  [address] [id number] [place] [address]-
6854     

 

 [facility] [place] [facility]  [address] [id number] [place] [address]-
4648     

 

 [facility] [place] [facility]  [address] [id number] [place] [address]-
1093     

 

 [facility] [place] [facility]  [address] [id number] [place] [address]-
6238     

 

 [facility] [place] [facility]  [address] [id number] [place] [address]-
7428     

 

 [facility] [place] [facility]  [address] [id number] [place] [address]-
[address]     

 

 [facility] [place] [facility]  [address] [id number] [place] [address]-
3633  10 Dec, 2013   

 

 [facility] [place] [facility]  [address] [id number] [place] [address]7  10 Dec, 2013   

 

 [facility]  [address] [id number] [place] [address]-
6489     

 

 [facility]  [address] [id number] [id number]
4414     







IMMUNIZATIONS

No Known Immunizations



SOCIAL HISTORY

Never Assessed



REASON FOR VISIT

medication refill 



PLAN OF CARE





VITAL SIGNS





MEDICATIONS

Unknown Medications



RESULTS

No Results



PROCEDURES

No Known procedures



INSTRUCTIONS





MEDICATIONS ADMINISTERED

No Known Medications



MEDICAL (GENERAL) HISTORY







 Type  Description  Date

 

 Medical History  narcolepsy   

 

 Surgical History  Gallbladder removed  2015

 

 Surgical History  Hernia repair  2016

## 2018-09-02 NOTE — XMS REPORT
[facility]

 Created on: 2017



Samantha Danykath

External Reference #: [id number]

: 1955

Sex: Female



Demographics







 Address  [address] [place]  [address]

 

 Preferred Language  Unknown

 

 Marital Status  Unknown

 

 Rastafarian Affiliation  Unknown

 

 Race  Unknown

 

 Ethnic Group  Unknown





Author







 Author  NAHUN SNYDER

 

 Organization  [facility]

 

 Address  [address] [place]  [address]



 

 Phone  [phone]







Care Team Providers







 Care Team Member Name  Role  Phone

 

 NAHUN SNYDER  Unavailable  [phone]







PROBLEMS







 Type  Condition  ICD9-CM Code  YRK14-JD Code  Onset Dates  Condition Status  
SNOMED Code

 

 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 
    Active  27624380799998

 

 Problem  Lumbago with sciatica, left side     M54.42     Active  [id number]

 

 Problem  Excessive daytime sleepiness     G47.19     Active  422130466309

 

 Problem  ADHD (attention deficit hyperactivity disorder), inattentive type     
F90.0     Active  [id number]







ALLERGIES







 Substance  Reaction  Event Type  Date  Status

 

 Penicillins  Unknown  Non Drug Allergy  22 Dec, 2016  Active







SOCIAL HISTORY

No smoking Hx information available



PLAN OF CARE





VITAL SIGNS







 Height  59 in  2016

 

 Weight  110.6 lbs  2016

 

 Temperature  98.0 degrees Fahrenheit  2016

 

 Heart Rate  88 bpm  2016

 

 Respiratory Rate  18   2016

 

 BMI  22.34 kg/m2  2016

 

 Blood pressure systolic  118 mmHg  2016

 

 Blood pressure diastolic  82 mmHg  2016







MEDICATIONS







 Medication  Instructions  Dosage  Frequency  Start Date  End Date  Duration  
Status

 

 Alprazolam 1 MG     1 tablet  8h  30 Mar, 2015        Active

 

 Tramadol HCl 50 mg  Orally every 6 hrs  1 tablet as needed  6h  02 Dec, 2016  
      Active

 

 Adderall XR 20 mg  Orally Once a day  1 capsule in the morning  24h  22 Dec, 
2016        Active

 

 Hydrocodone-Acetaminophen  MG  Orally every 6 hrs  1 tablet as needed  
6h  12 Dec, 2016        Active







RESULTS

No Results



PROCEDURES







 Procedure  Date Ordered  Related Diagnosis  Body Site

 

 Office Visit, Est Pt., Level 3  Dec 22, 2016      







IMMUNIZATIONS

No Known Immunizations

## 2018-09-02 NOTE — XMS REPORT
[facility]

 Created on: 2016



Sofiya Singh

External Reference #: [id number]

: 1955

Sex: Female



Demographics







 Address  [address] [place]  [address]

 

 Home Phone  [phone]

 

 Preferred Language  Unknown

 

 Marital Status  Unknown

 

 Latter day Affiliation  Unknown

 

 Race  White

 

 Ethnic Group  Not  or 





Author







 Author  NAHUN SNYDER

 

 Organization  [employer]

 

 Address  Unknown

 

 Phone  Unavailable







Care Team Providers







 Care Team Member Name  Role  Phone

 

 NAHUN SNYDER  CP  Unavailable



                                                                



Allergies

          No Known Allergies                                                   
                                     



Problems

          





 Problem Type  Condition  Code  Onset Dates  Condition Status

 

 Problem  Screening examination for pulmonary tuberculosis  V74.1     Active

 

 Problem  Pain in joint, pelvic region and thigh  719.45     Active

 

 Problem  Pneumonia, organism unspecified  486     Active

 

 Problem  Family history of ischemic heart disease  V17.3     Active

 

 Problem  Family history of other cardiovascular diseases  V17.49     Active

 

 Problem  Unspecified arthropathy, site unspecified  716.90     Active

 

 Problem  Anxiety state, unspecified  300.00     Active

 

 Problem  Lumbago  724.2     Active

 

 Problem  Family history of malignant neoplasm of breast  V16.3     Active

 

 Problem  Family history of diabetes mellitus  V18.0     Active



                                                                               
                                                                               
                    



Medications

          





 Medication  Code System  Code  Instructions  Start Date  End Date  Status  
Dosage

 

 tramadol  NDC  0  50 mg    2015        take 1 tablet (50 mg) by oral 
route every 6 hours as needed



                                                                              



Results

          No Known Results                                                     
               



Summary Purpose

          [employer] Submission

## 2018-09-02 NOTE — XMS REPORT
[facility]

 Created on: 2016



Samantha Danykath

External Reference #: [id number]

: 1955

Sex: Female



Demographics







 Address  [address] [place]  [address]

 

 Home Phone  [phone]

 

 Preferred Language  Unknown

 

 Marital Status  Unknown

 

 Christianity Affiliation  Unknown

 

 Race  White

 

 Ethnic Group  Not  or 





Author







 Author  NAHUN SNYDER

 

 Organization  [employer]

 

 Address  Unknown

 

 Phone  Unavailable







Care Team Providers







 Care Team Member Name  Role  Phone

 

 NAHUN SNYDER  CP  Unavailable



                                                                



Allergies

          No Known Allergies                                                   
                                     



Problems

          





 Problem Type  Condition  Code  Onset Dates  Condition Status

 

 Problem  Screening examination for pulmonary tuberculosis  V74.1     Active

 

 Problem  Pain in joint, pelvic region and thigh  719.45     Active

 

 Problem  Pneumonia, organism unspecified  486     Active

 

 Assessment  Lumbar neuritis  M54.16     Active

 

 Problem  Family history of ischemic heart disease  V17.3     Active

 

 Problem  Family history of other cardiovascular diseases  V17.49     Active

 

 Problem  Unspecified arthropathy, site unspecified  716.90     Active

 

 Problem  Anxiety state, unspecified  300.00     Active

 

 Problem  Lumbago  724.2     Active

 

 Problem  Family history of malignant neoplasm of breast  V16.3     Active

 

 Problem  Family history of diabetes mellitus  V18.0     Active



                                                                               
                                                                               
                              



Medications

          





 Medication  Code System  Code  Instructions  Start Date  End Date  Status  
Dosage

 

 Alprazolam  NDC  [address]-10  1 MG  Three times a day  2015        
1 tablet

 

 Hydrocodone-Acetaminophen  NDC  [address]-89   MG  every 6 hrs          1 tablet as needed



                                                                               
         



Results

          No Known Results                                                     
               



Summary Purpose

          [employer] Submission

## 2018-09-02 NOTE — XMS REPORT
[facility]

 Created on: 10/26/2017



Sofiya Singh

External Reference #: [id number]

: 1955

Sex: Female



Demographics







 Address  [address] [place]  [address]

 

 Preferred Language  Unknown

 

 Marital Status  Unknown

 

 Jainism Affiliation  Unknown

 

 Race  Unknown

 

 Ethnic Group  Unknown





Author







 Author  GUILLERMO HASKINS

 

 Organization  [facility]

 

 Address  [address]  [place]  [address]



 

 Phone  [phone]







Care Team Providers







 Care Team Member Name  Role  Phone

 

 DIVINE  GUILLERMO  Unavailable  [phone]







PROBLEMS







 Type  Condition  ICD9-CM Code  MUG75-TW Code  Onset Dates  Condition Status  
SNOMED Code

 

 Problem  Lumbago with sciatica, left side     M54.42     Active  917706216

 

 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 
    Active  77611467748266

 

 Problem  ADHD (attention deficit hyperactivity disorder), inattentive type     
F90.0     Active  78429544

 

 Problem  Excessive daytime sleepiness     G47.19     Active  [id number]







ALLERGIES

No Information



SOCIAL HISTORY

Never Assessed



PLAN OF CARE





VITAL SIGNS





MEDICATIONS







 Medication  Instructions  Dosage  Frequency  Start Date  End Date  Duration  
Status

 

 Alprazolam 1 MG  Orally Three times a day  1 tablet  8h  30 Mar, 2015     28 
days  Active

 

 Hydrocodone-Acetaminophen  MG  Orally every 6 hrs  1 tablet as needed  
6h  04 May, 2017     28 days  Active







RESULTS

No Results



PROCEDURES

No Known procedures



IMMUNIZATIONS

No Known Immunizations



MEDICAL (GENERAL) HISTORY







 Type  Description  Date

 

 Surgical History  Gallbladder removed  2015

 

 Surgical History  Hernia repair  2016

## 2018-09-02 NOTE — XMS REPORT
Continuity of Care Document

 Created on: 2018



PEPPER, VERA

External Reference #: [id number]

: 1955

Sex: Female



Demographics







 Address  [address], [place]  [address]

 

 Home Phone  [phone] x

 

 Preferred Language  Unknown

 

 Marital Status  Unknown

 

 [employer] Affiliation  Unknown

 

 Race  Unknown

 

 Ethnic Group  Unknown





Author







 Author  [facility]

 

 Address  Unknown

 

 Phone  Unavailable



              



Allergies

      





 Active            Description            Code            Type            
Severity            Reaction            Onset            Reported/Identified   
         Relationship to Patient            Clinical Status        

 

 Yes            Penicillins            [id number]            Drug Allergy      
      Unknown            N/A                         2007                
                  

 

 Yes            Penicillins                         Drug Allergy            N/A
            N/A                         2013                             
     



                    



Medications

      



There is no data.                  



Problems

      





 Date Dx Coded            Attending            Type            Code            
Diagnosis            Diagnosed By        

 

 2012                         Ot            041.12            METHICILLIN 
RESISTANT STAPHYLOCOCCUS AUR                     

 

 2012                         Ot            682.3            CELLULITIS 
OF ARM                     

 

 2013            NAHUN MANUEL                         300.00     
       ANXIETY UNSPEC                     

 

 2013            NAHUN MANUEL                         724.2      
      BACK PAIN, LOWER                     

 

 2013            KOURTNEY BOYLE MD                         300.00        
    ANXIETY UNSPEC                     

 

 2013            KOURTNEY BOYLE MD                         724.2         
   BACK PAIN, LOWER                     

 

 2013            NAHUN MANUEL                         300.00     
       ANXIETY UNSPEC                     

 

 2013            NAHUN MANUEL                         724.2      
      BACK PAIN, LOWER                     

 

 2013            NAHUN MANUEL T                         300.00     
       ANXIETY UNSPEC                     

 

 2013            NAHUN MANUEL                         724.2      
      BACK PAIN, LOWER                     

 

 2013            NAHUN MANUEL T                         300.00     
       ANXIETY UNSPEC                     

 

 2013            NAHUN MANUEL                         724.2      
      BACK PAIN, LOWER                     

 

 2013            NAHUN MANUEL                         300.00     
       ANXIETY UNSPEC                     

 

 2013            NAHUN MANUEL                         724.2      
      BACK PAIN, LOWER                     

 

 2014            NAHUN MANUEL                         V74.1      
      TB SCREENING                     

 

 2014            KOURTNEY BOYLE MD                         V74.1         
   TB SCREENING                     

 

 2014            NAHUN MANUEL                         V74.1      
      TB SCREENING                     

 

 2014            NAHUN MANUEL                         V74.1      
      TB SCREENING                     

 

 2014            NAHUN MANUEL                         V74.1      
      TB SCREENING                     

 

 2014            NAHUN MANUEL                         V74.1      
      TB SCREENING                     

 

 2014            THADDEUS SEGOVIA, NATAN R            Ot            922.1       
     CONTUSION OF CHEST WALL                     

 

 2014            THADDEUS SEGOVIA, NATAN R            Ot            959.11      
      OTH INJURY OF CHEST WALL                     

 

 2014            NATAN TRAVIS MD R            Ot            E000.8      
      OTHER EXTERNAL CAUSE STATUS                     

 

 2014            NATAN TRAVIS MD R            Ot            E888.9      
      FALL NOS                     

 

 2014            KOURTNEY BOYLE MD                         V16.3         
   family hx of breast cancer                     

 

 2014            KOURTNEY BOYLE MD                         V17.3         
   FAM HX MI                     

 

 2014            KOURTNEY BOYLE MD                         V17.49        
    FAMILY HISTORY OF OTHER CARDIOVASCULAR DISEASES                     

 

 2014            KOURTNEY BOYLE MD                         V18.0         
   FAMILY HISTORY OF DIABETES MELLITUS                     

 

 2014            NAHUN MANUEL                         V16.3      
      family hx of breast cancer                     

 

 2014            NAHUN MANUEL                         V17.3      
      FAM HX MI                     

 

 2014            NAHUN MANUEL                         V17.49     
       FAMILY HISTORY OF OTHER CARDIOVASCULAR DISEASES                     

 

 2014            NAHUN MANUEL                         V18.0      
      FAMILY HISTORY OF DIABETES MELLITUS                     

 

 2014            NAHUN MANUEL                         V16.3      
      family hx of breast cancer                     

 

 2014            NAHUN MANUEL                         V17.3      
      FAM HX MI                     

 

 2014            NAHUN MANUEL                         V17.49     
       FAMILY HISTORY OF OTHER CARDIOVASCULAR DISEASES                     

 

 2014            NAHUN MANUEL                         V18.0      
      FAMILY HISTORY OF DIABETES MELLITUS                     

 

 2014            NAHUN MANUEL                         V16.3      
      family hx of breast cancer                     

 

 2014            NAHUN MANUEL                         V17.3      
      FAM HX MI                     

 

 2014            NAHUN MANUEL                         V17.49     
       FAMILY HISTORY OF OTHER CARDIOVASCULAR DISEASES                     

 

 2014            NAHUN MANUEL                         V18.0      
      FAMILY HISTORY OF DIABETES MELLITUS                     

 

 2014            NAHUN MANUEL                         V16.3      
      family hx of breast cancer                     

 

 2014            NAHUN MANUEL                         V17.3      
      FAM HX MI                     

 

 2014            NAHUN MANUEL                         V17.49     
       FAMILY HISTORY OF OTHER CARDIOVASCULAR DISEASES                     

 

 2014            NAHUN MANUEL                         V18.0      
      FAMILY HISTORY OF DIABETES MELLITUS                     

 

 2014            NAHUN MNAUEL                         719.45     
       PAIN- HIP                     

 

 2014            LILLIAN SORIAN, NAHUN T                         719.45     
       PAIN- HIP                     

 

 2014            LILLIAN APRN, NAHUN T                         719.45     
       PAIN- HIP                     

 

 2014            LILLIAN APRN, NAHUN T                         719.45     
       PAIN- HIP                     

 

 2014            CHICHO MCBRIDE APRN            Ot            708.9      
      URTICARIA NOS                     

 

 2014            LILLIAN APRN, NAHUN T                         486        
    PNEUMONIA UNSPECIFIED                     

 

 2014            LILLIAN APRN, NAHUN T                         486        
    PNEUMONIA UNSPECIFIED                     

 

 2014            LILLIAN APRN, NAHUN T                         486        
    PNEUMONIA UNSPECIFIED                     

 

 10/01/2014            LILLIAN APRN, NAHUN T                         716.90     
       ARTHRITIS/ ARTHROPATHY, UNSPECIFIED                     

 

 10/01/2014            LILLIAN APRN, NAHUN T                         716.90     
       ARTHRITIS/ ARTHROPATHY, UNSPECIFIED                     

 

 2015                         Ot            786.2                        
          

 

 2015                         Ot            721.3                        
          

 

 2015            NAHUN SNYDER ARNP            Ot            737.30    
                              

 

 2015            NAHUN SNYDER ARNP            Ot            795.51    
                              

 

 2015            NAHUN SNYDERP            Ot            V74.1     
                             

 

 2015            EVE DO, WILFRIDO K            Ot            574.20         
   CHOLELITHIASIS NOS                     

 

 2015            EVE DO, WILFRIDO K            Ot            599.0          
  URIN TRACT INFECTION NOS                     

 

 2015            EVE DO, WILFRIDO K            Ot            789.03         
   ABDOMINAL PAIN, RIGHT LOWER QUADRANT                     

 

 2016                         Ot            786.2                        
          

 

 2016                         Ot            721.3                        
          

 

 2016            NAHUN SNYDER ARNP            Ot            737.30    
                              

 

 2016            NAHUN SNYDERP            Ot            795.51    
                              

 

 2016            NAHUN SNYDER ARNP            Ot            V74.1     
                             

 

 2016            VINAYAK LEONE            Ot            F17.210  
          NICOTINE DEPENDENCE, CIGARETTES, UNCOMPL                     

 

 2016            VINAYAK LEONE            Ot            G89.29   
         OTHER CHRONIC PAIN                     

 

 2016            VINAYAK LEONE            Ot            K40.90   
         UNIL INGUINAL HERNIA, W/O OBST OR GANGR,                     

 

 2016            VINAYAK LEONE            Ot            M54.5    
        LOW BACK PAIN                     

 

 2016                         Ot            786.2                        
          

 

 2016                         Ot            721.3                        
          

 

 2016            NAHUN SNYDER ARNP            Ot            737.30    
                              

 

 2016            NAHUN SNYDER ARNP            Ot            795.51    
                              

 

 2016            NAHUN SNYDER            Ot            V74.1     
                             

 

 2016            KRISTINA BOYLE DO            Ot            K40.90      
      UNIL INGUINAL HERNIA, W/O OBST OR GANGR,                     

 

 2016            KRISTINA BOYLE DO            Ot            Z01.818     
       ENCOUNTER FOR OTHER PREPROCEDURAL EXAMIN                     

 

 2016            KRISTINA BOYLE DO            Ot            Z11.2       
     ENCOUNTER FOR SCREENING FOR OTHER BACTER                     

 

 2016            BOYLE MONIK FRITZBIA VERA            Ot            K40.90      
      UNIL INGUINAL HERNIA, W/O OBST OR GANGR,                     

 

 2016            KRISTINA BOYLE DO            Ot            K40.90      
                            

 

 10/04/2016                         Ot            721.3            LUMBOSACRAL 
SPONDYLOSIS                     

 

 10/04/2016            NAHUN SNYDER ARNANDREE            Ot            737.30    
        IDIOPATHIC SCOLIOSIS                     

 

 10/04/2016            NAHUN SNYDER            Ot            795.51    
        NONSP RX TO TUBERCULIN SKIN TEST W/O ACT                     

 

 10/04/2016            NAHUN SNYDER            Ot            V74.1     
       SCREENING-PULMONARY TB                     

 

 2016            KATRIN SEGOVIA, PRISCILLA T            Ot            
F17.210            NICOTINE DEPENDENCE, CIGARETTES, UNCOMPL                     

 

 2016            KATRIN SEGOVIA, PRISCILLA T            Ot            M54.9 
           DORSALGIA, UNSPECIFIED                     

 

 2016            KATRIN SEGOVIA, PRISCILLA T            Ot            R07.89
            OTHER CHEST PAIN                     

 

 2016                         Ot            721.3            LUMBOSACRAL 
SPONDYLOSIS                     

 

 2016            NAHUN SNYDER ARNP            Ot            737.30    
        IDIOPATHIC SCOLIOSIS                     

 

 2016            NAHUN SNYDER            Ot            795.51    
        NONSP RX TO TUBERCULIN SKIN TEST W/O ACT                     

 

 2016            NAHUN SNYDER            Ot            V74.1     
       SCREENING-PULMONARY TB                     

 

 2016            KATRIN SEGOVIA, PRISCILLA T            Ot            
F17.210            NICOTINE DEPENDENCE, CIGARETTES, UNCOMPL                     

 

 2016            KATRIN SEGOVIA, PRISCILLA T            Ot            M54.5 
           LOW BACK PAIN                     

 

 2016            KATRIN SEGOVIA, PRISCILLA T            Ot            R07.89
            OTHER CHEST PAIN                     

 

 2016            KATRIN SEGOVIA, PRISCILLA T            Ot            
F17.210            NICOTINE DEPENDENCE, CIGARETTES, UNCOMPL                     

 

 2016            KATRIN SEGOVIA, PRISCILLA T            Ot            M54.9 
           DORSALGIA, UNSPECIFIED                     

 

 2016            KATRIN SEGOVIA, PRISCILLA T            Ot            R07.89
            OTHER CHEST PAIN                     



                                                                               
                                                                               
                                                      



Procedures

      





 Code            Description            Performed By            Performed On   
     

 

             79332                                  XRAY CHEST 2 VIEW          
                         2013        

 

             AMERITOX                                  AMERITOX DRUG SCREEN    
                               2014        

 

             41102                                  XRAY HIP RIGHT UNILATERAL 
MIN 2 VIEWS                                   2014        



                      



Results

      





 Test            Result            Range        









 CBC With Differential/Platelet - 10/17/16 16:15         









 WBC            5.4 x10E3/uL            3.4-10.8        

 

 RBC            4.29 x10E6/uL            3.77-5.28        

 

 Hemoglobin            13.6 g/dL            11.1-15.9        

 

 Hematocrit            40.5 %            34.0-46.6        

 

 MCV            94 fL            79-97        

 

 MCH            31.7 pg            26.6-33.0        

 

 MCHC            33.6 g/dL            31.5-35.7        

 

 RDW            14.2 %            12.3-15.4        

 

 Platelets            176 x10E3/uL            150-379        

 

 Neutrophils            58 %                     

 

 Lymphs            35 %                     

 

 Monocytes            5 %                     

 

 Eos            2 %                     

 

 Basos            0 %                     

 

 Neutrophils (Absolute)            3.1 x10E3/uL            1.4-7.0        

 

 Lymphs (Absolute)            1.9 x10E3/uL            0.7-3.1        

 

 Monocytes(Absolute)            0.3 x10E3/uL            0.1-0.9        

 

 Eos (Absolute)            0.1 x10E3/uL            0.0-0.4        

 

 Baso (Absolute)            0.0 x10E3/uL            0.0-0.2        

 

 Immature Granulocytes            0 %                     

 

 Immature Grans (Abs)            0.0 x10E3/uL            0.0-0.1        









 Comp. Metabolic Panel (14) - 10/17/16 16:15         









 Glucose, Serum            96 mg/dL            65-99        

 

 BUN            12 mg/dL            6-24        

 

 Creatinine, Serum            0.79 mg/dL            0.57-1.00        

 

 eGFR If NonAfricn Am            82 mL/min/1.73                >59        

 

 eGFR If Africn Am            95 mL/min/1.73                >59        

 

 BUN/Creatinine Ratio            15             9-23        

 

 Sodium, Serum            138 mmol/L            136-144        

 

 Potassium, Serum            4.0 mmol/L            3.5-5.2        

 

 Chloride, Serum            101 mmol/L                    

 

 Carbon Dioxide, Total            24 mmol/L            18-29        

 

 Calcium, Serum            9.2 mg/dL            8.7-10.2        

 

 Protein, Total, Serum            6.7 g/dL            6.0-8.5        

 

 Albumin, Serum            4.2 g/dL            3.5-5.5        

 

 Globulin, Total            2.5 g/dL            1.5-4.5        

 

 A/G Ratio            1.7             1.1-2.5        

 

 Bilirubin, Total            0.3 mg/dL            0.0-1.2        

 

 Alkaline Phosphatase, S            83 IU/L                    

 

 AST (SGOT)            25 IU/L            0-40        

 

 ALT (SGPT)            12 IU/L            0-32        









 TSH - 10/17/16 16:15         









 TSH            0.356 uIU/mL            0.450-4.500        









 TSH - 16 13:05         









 TSH            0.467 uIU/mL            0.450-4.500        









 ANTINUCLEAR ANTIBODIES   TITER AND PATTERN - 18 09:11         









 ZEE PATTERN            HOMOGENEOUS             NRG        

 

 ZEE TITER            1:80 titer            NRG        



                        



Encounters

      





 ACCT No.            Visit Date/Time            Discharge            Status    
        Pt. Type            Provider            Facility            Loc./Unit  
          Complaint        

 

 [id number]            2015 09:34:00            2015 23:59:59          
  CLS            Outpatient            NAHUN MANUEL                    
                           

 

 [id number]            10/01/2014 10:19:00            10/01/2014 23:59:59          
  CLS            Outpatient            NAHUN MANUEL                    
                           

 

 [id number]            2014 15:00:00            2014 23:59:59          
  CLS            Outpatient            NAHUN MANUEL                    
                           

 

 [id number]            2014 12:41:00            2014 23:59:59          
  CLS            Outpatient            NAHUN MANUEL                    
                           

 

 [id number]            2014 09:43:00            2014 23:59:59          
  CLS            Outpatient            KOURTNEY BOYLE MD                       
                        

 

 [id number]            2013 11:47:00            2013 23:59:59          
  CLS            Outpatient            NAHUN MANUEL                    
                           

 

 [id number]            2018 16:40:00            2018 23:59:59            
CLS            Outpatient            NAHUN MANUEL                      
   [facility]                     

 

 [id number]            2018 08:20:00                                      
Document Registration                                                          
  

 

 [id number]            2018 11:00:00            2018 23:59:59    
        CLS            Preadmit            NAHUN SNYDER ARNP            [facility]            RAD            RLQ PAIN,CHRONIC PAIN   
     

 

 [id number]            2016 05:21:00            2016 07:06:00    
        DIS            Emergency            PRISCILLA WILKES MD            
[facility]            ER            R SIDE RIB AND BACK 
PAIN        

 

 [id number]            2016 08:52:00            2016 15:45:00    
        DIS            Outpatient            KRISTINA BOYLE DO            [facility]            SDC            RIGHT INGUINAL HERNIA   
     

 

 [id number]            2016 09:22:00            2016 10:02:00    
        DIS            Outpatient            KRISTINA BOYLE DO            [facility]            PREOP            RIGHT INGUINAL HERNIA 
       

 

 [id number]            2016 20:02:00            2016 23:25:00    
        DIS            Emergency            VINAYAK LEONE            
[facility]            ER            R SIDE GROIN PAIN    
    

 

 [id number]            2015 16:39:00            2015 20:07:00    
        DIS            Emergency            WILFRIDO PRADO DO K            [facility]            ER            LOWER ABD PAIN        

 

 [id number]            2014 21:12:00            2014 22:06:00    
        DIS            Emergency            CHICHO MCBRIDE APRN            [facility]            ER            HIVES        

 

 [id number]            2014 09:49:00            2014 23:59:59    
        CLS            Outpatient            NAHUN SNYDER            
[facility]            RAD            TB SCREENING        

 

 [id number]            2014 10:12:00            2014 11:31:00    
        DIS            Emergency            NATAN TRAVIS MD            [facility]            ER            R SIDE RIB PAIN        

 

 [id number]            2012 20:25:00                                   
   Document Registration                                                       
     

 

 [id number]            2011 13:19:00                                   
   Document Registration                                                       
     

 

 [id number]            10/07/2010 09:23:00                                   
   Document Registration                                                       
     

 

 [id number]            2016 10:06:00                                   
   Document Registration                                                       
     

 

 [id number]            10/18/2016 08:41:00                                   
   Document Registration

## 2018-09-02 NOTE — XMS REPORT
[facility]

 Created on: 2018



Sofiya Singh

External Reference #: [id number]

: 1955

Sex: Female



Demographics







 Address  [address] [place]  [address]

 

 Preferred Language  Unknown

 

 Marital Status  Unknown

 

 Anabaptist Affiliation  Unknown

 

 Race  Unknown

 

 Ethnic Group  Unknown





Author







 Author  NAHUN SNYDER

 

 Organization  [facility]

 

 Address  [address] [place]  [address]



 

 Phone  [phone]







Care Team Providers







 Care Team Member Name  Role  Phone

 

 NAHUN SNYDER  Unavailable  [phone]







PROBLEMS







 Type  Condition  ICD9-CM Code  HKV70-TJ Code  Onset Dates  Condition Status  
SNOMED Code

 

 Problem  ADHD (attention deficit hyperactivity disorder), inattentive type     
F90.0     Active  00854737

 

 Problem  Arthritis     M19.90     Active  2856895

 

 Problem  Positive skin test for tuberculosis     R76.11     Active  880623090

 

 Problem  Lumbago with sciatica, left side     M54.42     Active  310830439

 

 Problem  Excessive daytime sleepiness     G47.19     Active  [id number]

 

 Problem  Primary narcolepsy without cataplexy     G47.419     Active  
[id number]

 

 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 
    Active  [id number]







ALLERGIES

No Information



ENCOUNTERS







 Encounter  Location  Date  Diagnosis

 

 [place] [address] [address]-
2460  14 Mar, 2018  ADHD (attention deficit hyperactivity disorder), 
inattentive type F90.0 ; Lumbago with sciatica, left side M54.42 and Arthritis 
M19.90

 

 [place] [address] [place] [id number] [place] [address] 38977-
5039    ADHD (attention deficit hyperactivity disorder), 
inattentive type F90.0 and Lumbar neuritis M54.16

 

 [place] [address] [place] [id number] [place] [address] 84413-
7213     

 

 [place] [address] [address]-
4983    Lumbar neuritis M54.16

 

 [place] [address] [place] [id number] [place] [address] 94284-
9731    Low back pain M54.5

 

 [facility]  [address] [place] [address] 99541-
3058    ADHD (attention deficit hyperactivity disorder), 
inattentive type F90.0

 

 [facility]  [id number] [place] [address] 09624-
6509    Positive skin test for tuberculosis R76.11

 

 [facility]  [id number] [place] [address] 90489-
7492    Positive skin test for tuberculosis R76.11

 

 [facility]  [id number] [place] [address] 32401-
0852     

 

 [facility]  [id number] [place] [address] 09230-
9844    Lumbar neuritis M54.16

 

 [facility]  [id number] [place] [address] 10178-
2537    ADHD (attention deficit hyperactivity disorder), 
inattentive type F90.0

 

 [facility]  [id number] [place] [address] 39087-
7664     

 

 [facility]  [id number] [place] [address] 82933-
9105  20 Dec, 2017  Lumbar neuritis M54.16

 

 [facility]  [id number] [place] [address] 97798-
3955  15 Dec, 2017  ADHD (attention deficit hyperactivity disorder), 
inattentive type F90.0

 

 [facility]  [address] [place] [address] 84316-
9605  12 Dec, 2017   

 

 [facility]  [id number] [place] [address] 13577-
5667  11 Dec, 2017   

 

 [facility]  [address] [place] [id number] [place] [address] 55695-
0417    Lumbar neuritis M54.16

 

 [facility]  [id number] [place] [address] 31284-
7716    ADHD (attention deficit hyperactivity disorder), 
inattentive type F90.0 and Primary narcolepsy without cataplexy G47.419

 

 [facility]  [address] [place] [id number] [place] [address] 41274-
6252  10 Nov, 2017   

 

 [facility]  [id number] [place] [address] 80901-
8530     

 

 [facility]  [id number] [place] [address] 69618-
9299  23 Oct, 2017  Lumbar neuritis M54.16 and ADHD (attention deficit 
hyperactivity disorder), inattentive type F90.0

 

 [facility]  [id number] [place] [address] 75108-
5314  12 Oct, 2017   

 

 [facility]  [id number] [place] [address] 39358-
6294  26 Sep, 2017  Lumbar neuritis M54.16 and ADHD (attention deficit 
hyperactivity disorder), inattentive type F90.0

 

 [facility]  [address] [place] [id number] [place] [address] 94187-
5859  19 Sep, 2017   

 

 [facility]  [id number] [place] [address] 83355-
3437  31 Aug, 2017  ADHD (attention deficit hyperactivity disorder), 
inattentive type F90.0 and Lumbar neuritis M54.16

 

 [facility]  [id number] [place] [address] 35905-
2812  24 Aug, 2017  Lumbar neuritis M54.16

 

 [facility]  [id number] [place] [address] 52436-
1322  23 Aug, 2017   

 

 [facility]  [id number] [place] [address] 24677-
3325  02 Aug, 2017  ADHD (attention deficit hyperactivity disorder), 
inattentive type F90.0 and Lumbar neuritis M54.16

 

 [facility]  [id number] [place] [address] 64347-
1521  02 Aug, 2017   

 

 [facility]  [address] [place] [address]-
1124     

 

 [facility]  [address] [address]-
5158     

 

 [facility]  [address], [place] [address]-
0827     

 

 [facility]  [address] [place] [id number] [place] [address] 87343-
1765     

 

 [facility]  [address] [place] [id number] [place] [address] 09276-
7779    ADHD (attention deficit hyperactivity disorder), 
inattentive type F90.0 and Lumbar neuritis M54.16

 

 [facility]  [address] [address]-
0510     

 

 [facility]  [address] [place] [address] [id number] [place] [address] 62898-
1036     

 

 [facility]  [address] [place] [address] 24626-
7704    Lumbar neuritis M54.16 and ADHD (attention deficit 
hyperactivity disorder), inattentive type F90.0

 

 [facility]  [address], [place] [address]-
5982     

 

 [facility]  [address] [address]-
5323  10 May, 2017  Lumbar neuritis M54.16 and ADHD (attention deficit 
hyperactivity disorder), inattentive type F90.0

 

 [facility]  [address], [place] [address]-
6869  03 May, 2017   

 

 [facility]  [address] [address]-
2964  01 May, 2017   

 

 [facility]  [address] [address]-
6202    Narcolepsy due to underlying condition without cataplexy 
G47.429 and Lumbago with sciatica, left side M54.42

 

 [facility] [place] [facility]  [address] [address]-
3806  14 2017  Lumbar neuritis M54.16 and ADHD (attention deficit 
hyperactivity disorder), inattentive type F90.0

 

 [facility]  [address] [place] [id number] [address]-
1566  31 Mar, 2017   

 

 [facility]  [id number] [place] [address] 27452-
6830  15 Mar, 2017  Lumbar neuritis M54.16 and ADHD (attention deficit 
hyperactivity disorder), inattentive type F90.0

 

 [facility]  [id number] [place] [address] 84687-
0368  15 Mar, 2017   

 

 [facility]  [id number] [place] [address] 82362-
7276  06 Mar, 2017   

 

 [facility]  [id number] [place] [address] 30441-
5356  15 2017  ADHD (attention deficit hyperactivity disorder), 
inattentive type F90.0

 

 [facility]  [address] [place] [address] 92171-
1629  15 2017  Lumbar neuritis M54.16

 

 [facility]  [id number] [address]-
0290  08 2017   

 

 [facility]  [id number] [place] [address] 00773-
6008     

 

 [facility]  [id number] [place] [address] 31073-
1944    Attention deficit hyperactivity disorder (ADHD), 
predominantly inattentive type F90.0

 

 [facility]  [id number] [place] [address] 27483-
6696     

 

 [facility]  [address], [place] [address]-
8118  10 Niall, 2017   

 

 [facility]  [id number] [place] [address] 73912-
1671     

 

 [facility]  [address] [place] [address] 10354-
7761  22 Dec, 2016  Attention deficit hyperactivity disorder (ADHD), 
predominantly inattentive type F90.0

 

 [facility]  [id number] [place] [address] 25442-
2154  12 Dec, 2016   

 

 [facility]  [id number] [place] [address] 26746-
3574  07 Dec, 2016   

 

 [facility]  [id number] [place] [address] 00428-
7681  05 Dec, 2016   

 

 [facility]  [id number] [place] [address] 22835-
5080  05 Dec, 2016   

 

 [facility]  [id number] [place] [address] 63175-
6612  05 Dec, 2016  Low TSH level R94.6

 

 [facility]  [id number] [place] [address] 49018-
6745  02 Dec, 2016   

 

 [facility]  [id number] [place] [address] 03883-
3254  10 Nov, 2016   

 

 [facility]  [id number] [place] [address] 65654-
6256  10 Nov, 2016   

 

 [facility]  [id number] [place] [address] 78173-
9843     

 

 [facility]  [id number] [place] [address] 88101-
0625  18 Oct, 2016  Low TSH level R94.6

 

 [facility]  [address] [place] [address] 01203-
8895  17 Oct, 2016  Excessive daytime sleepiness G47.19 and ADHD (attention 
deficit hyperactivity disorder), inattentive type F90.0

 

 [facility]  [address] [place] [address] 50704-
7621  13 Oct, 2016   

 

 [facility]  [id number] [place] [address] 74194-
5591  12 Oct, 2016   

 

 [facility]  [address] [facility] [id number] [place] [address]-
0210  03 Oct, 2016   

 

 [facility]  [address] [facility] [id number] [place] [address], [place] [address]-
3852  28 Sep, 2016   

 

 [facility]  [address] [facility] [id number] [place] [address]-
5247  14 Sep, 2016   

 

 [facility]  [address] [facility] [id number] [place] [address], [place] [address]-
8844  01 Sep, 2016   

 

 [facility]  [address] [facility] [id number] [place] [address], [place] [address]-
5466  17 Aug, 2016   

 

 [facility]  [address] [facility] [id number] [place] [address], [place] [address]-
4105  04 Aug, 2016   

 

 [facility]  [address] [facility] [id number] [place] [address], [place] [address]-
4534  03 Aug, 2016   

 

 [facility]  [address] [place] [id number] [place] [address] 57941-
4097    Lumbar neuritis M54.16

 

 [facility]  [address] [id number] [address]-
7652     

 

 [facility]  [address] [place] [id number] [address]-
8828     

 

 [facility]  [address]65[address], [place] [address]-
8147    Lumbar neuritis M54.16

 

 [facility]  [address] [facility] [id number] [address]-
2471     

 

 [facility]  [address] [place] [id number] [address]-
6864     

 

 [facility] [place] [facility]  [address] [facility] [id number] [address]-
9748  26 May, 2016  Lumbar neuritis M54.16

 

 [facility]  [address] [place] [id number] [address]-
7370  25 May, 2016   

 

 [facility]  [address] [place] [address] 45558-
1249  10 May, 2016   

 

 [facility]  [id number] [place] [address] 41141-
6565    Lumbar neuritis M54.16

 

 [facility]  [id number] [place] [address] 64337-
8982     

 

 [facility]  [id number] [place] [address] 70471-
7867     

 

 [facility]  [id number] [place] [address] 51806-
7346  23 Mar, 2016   

 

 [facility]  [id number] [place] [address] 46030-
7884  14 Mar, 2016   

 

 [facility]  [id number] [place] [address] 14615-
0559  14 Mar, 2016   

 

 [facility]  [id number] [place] [address] 05807-
7441  11 Mar, 2016   

 

 [facility]  [id number] [place] [address] 33733-
8727  24 2016   

 

 [facility]  [id number] [address]-
0466    Inguinal hernia, right K40.90

 

 [facility]  [address] [id number] [place] [address] 17125-
7607     

 

 [facility]  [address] [place] [address] 82647-
3258  15 2016  Low back pain M54.5 and Sciatica, unspecified side M54.30

 

 [facility]  [id number] [address]-
1442     

 

 [facility]  [id number] [place] [address] 78587-
3019     

 

 [facility]  [address] [facility] [id number] [place] [address]-
7119  30 Dec, 2015   

 

 [facility]  [address] [place] [id number] [place] [address], [place] [address]-
5341  28 Dec, 2015   

 

 [facility] [place] [facility]  [address] [facility] [id number] [place] [address]-
4478  02 Dec, 2015   

 

 [facility] [place] [facility]  [address] [facility] [id number] [place] [address], [place] [address]-
1278     

 

 [facility] [place] [facility]  [address] [facility] [id number] [place] [address]-
0068     

 

 [facility] [place] [facility]  [id number] [place] [address], [place] [address]-
5708     

 

 [facility] [place] [facility]  [address] [place] [id number] [place] [address], [place] [address]-
5272     

 

 [facility] [place] [facility]  [id number] [place] [address], [place] [address]-
0004  26 Oct, 2015   

 

 [facility]  [address] [place] [id number] [place] [address] 96711-
6425  22 Oct, 2015  Encounter for immunization Z23

 

 [facility]  [address] [place] [address] 53223-
5024  07 Oct, 2015   

 

 [facility]  [address] [address]-
1337  05 Oct, 2015   

 

 [facility] [place] [facility]  [address]00565[address] [address]-
7821  09 Sep, 2015   

 

 [facility] [place] [facility]  [address] [id number] [address]-
7706  08 Sep, 2015   

 

 [facility] [place] [facility]  [id number] [address]-
2872  19 Aug, 2015  Lumbago 724.2

 

 [facility] [place] [facility]  [address]100[facility] [place] [address]-
3290  12 Aug, 2015   

 

 [facility] [place] [facility]  [address] [id number] [address]-
1951    Lumbago 724.2

 

 [facility] [place] [facility]  [address] [id number] [place] [address]-
2291  17 2015   

 

 [facility] [place] [facility]  [address] [id number] [place] [address]-
9429     

 

 [facility] [place] [facility]  [address] [id number] [place] [address]-
8304     

 

 [facility] [place] [facility]  [address] [id number] [place] [address]-
5268     

 

 [facility] [place] [facility]  [address] [id number] [place] [address]-
8137     

 

 [facility] [place] [facility]  [address] [facility] [id number] [place] [address]-
3345  22 May, 2015   

 

 [facility] [place] [facility]  [address] [facility] [id number] [place] [address]-
9805     

 

 [facility] [place] [facility]  [address] [facility] [id number] [place] [address]-
8538     

 

 [facility] [place] [facility]  [address] [facility] [id number] [place] [address]-
6916  30 Mar, 2015   

 

 [facility] [place] [facility]  [address] [facility] [id number] [place] [address]-
7411  30 Mar, 2015   

 

 [facility] [place] [facility]  [address] [facility] [id number] [place] [address]-
2546  02 Mar, 2015   

 

 [facility] [place] [facility]  [address] [facility] [id number] [place] [address]-
8136  02 Mar, 2015   

 

 [facility] [place] [facility]  [address] [facility] [id number] [place] [address]-
2466     

 

 [facility] [place] [facility]  [address] [facility] [id number] [place] [address]-
2546     

 

 [facility] [place] [facility]  [address] [facility] [id number] [place] [address]-
7336     

 

 [facility] [place] [facility]  [address] [id number] [place] [address]-
5658     

 

 [facility] [place] [facility]  [address] [id number] [place] [address]-
9112     

 

 [facility] [place] [facility]  [address] [facility] [id number] [place] [address]-
5106     

 

 [facility] [place] [facility]  [address] [id number] [place] [address]-
4205  31 Dec, 2014   

 

 [facility] [place] [facility]  [address] [id number] [place]
8571  31 Dec, 2014   

 

 [facility] [place] [facility]  [address] [facility] [id number] [place] [address]-
5335  22 Dec, 2014   

 

 [facility] [place] [facility]  [address] [facility] [id number] [place] [address]-
8063  22 Dec, 2014   

 

 [facility] [place] [facility]  [address] [id number] [place] [address]-
4762  08 Dec, 2014   

 

 [facility] [place] [facility]  [address] [facility] [id number] [place] [address]-
2187  08 Dec, 2014   

 

 [facility] [place] [facility]  [address] [facility] [id number] [place] [address]-
6431  10 Nov, 2014   

 

 [facility] [place] [facility]  [address] [facility] [id number] [address]-
8507  10 Nov, 2014   

 

 [facility] [place] [facility]  [address] [id number] [address]-
2312  13 Oct, 2014   

 

 [facility] [place] [facility]  [address] [id number] [place] [address]-
5969  13 Oct, 2014   

 

 [facility] [place] [facility]  [address] [facility] [id number][place] [address]-
3293  01 Oct, 2014   

 

 [facility] [place] [facility]  [address] [facility] [id number] [address]-
5902  01 Oct, 2014   

 

 [facility] [place] [facility]  [address] [id number] [place] [address]-
0844  15 Sep, 2014   

 

 [facility]  [address] [id number] [place] [address]-
7021  15 Sep, 2014   

 

 [facility] [place] [facility]  [address] [id number] [place] [address]-
3302  18 Aug, 2014   

 

 [facility] [place] [facility]  [address] [id number] [place] [address]-
2844  18 Aug, 2014   

 

 [facility] [place] [facility]  [address] [id number] [place] [address]-
9025  18 Aug, 2014   

 

 [facility] [place] [facility]  [address] [id number] [place] [address]-
0413  18 Aug, 2014   

 

 [facility] [place] [facility]  [address] [id number] [place] [address]-
6602     

 

 [facility] [place] [facility]  [address] [id number] [place] [address]-
8187     

 

 [facility]  [address] [id number] [place] [address]-
7366     

 

 [facility] [place] [facility]  [address] [id number] [place] [address]-
2245     

 

 [facility]  [address] [id number] [place] [address]-
4936     

 

 [facility] [place] [facility]  [address] [id number] [place] [address]-
0782     

 

 [facility] [place] [facility]  [address] [id number] [place] [address]-
6416  30 May, 2014   

 

 [facility] [place] [facility]  [address] [id number] [place] [address]-
9810  28 May, 2014   

 

 [facility] [place] [facility]  [address] [id number] [place] [address]-
6588  28 May, 2014   

 

 [facility] [place] [facility]  [address] [id number] [place] [address]-
3971     

 

 [facility] [place] [facility]  [address] [id number] [place] [address]-
3582  30 2014   

 

 [facility] [place] [facility]  [address] [id number] [place] [address]-
1264     

 

 [facility] [place] [facility]  [address] [id number] [place] [address]-
6125     

 

 [facility] [place] [facility]  [address] [id number] [place] [address]-
9388     

 

 [facility] [place] [facility]  [address] [id number] [place] [address]-
0371     

 

 [facility] [place] [facility]  [address] [id number] [place] [address]-
0550     

 

 [facility] [place] [facility]  [address] [id number] [place] [address]-
2152     

 

 [facility] [place] [facility]  [address] [address] [id number] [place] [address]-
[address]     

 

 [facility] [place] [facility]  [address] [id number] [place] [address]-
32[address]     

 

 [facility] [place] [facility]  [address] [id number] [place] [address]-
6235     

 

 [facility] [place] [facility]  [address] [id number] [place] [address]-
2942  28 Mar, 2014   

 

 [facility] [place] [facility]  [address] [id number] [place] [address]-
3129  27 Mar, 2014   

 

 [facility] [place] [facility]  [address] [id number] [place] [address]-
4461  27 Mar, 2014   

 

 [facility] [place] [facility]  [address] [id number] [place] [address]-
5477  26 Mar, 2014   

 

 [facility] [place] [facility]  [address] [id number] [place] [address]-
7445  26 Mar, 2014   

 

 [facility] [place] [facility]  [address] [id number] [place] [address]-
7210     

 

 [facility] [place] [facility]  [address] [id number] [place] [address]-
6396     

 

 [facility] [place] [facility]  [address] [id number] [place] [address]-
4[address]     

 

 [facility] [place] [facility]  [address] [id number] [place] [address]-
3156     

 

 [facility] [place] [facility]  [address] [id number] [place] [address]-
0760     

 

 [facility] [place] [facility]  [address] [id number] [place] [address]-
6406     

 

 [facility] [place] [facility]  [address] [id number] [place] [address]-
6916     

 

 [facility] [place] [facility]  [address] [id number] [place] [address]-
0446     

 

 [facility] [place] [facility]  [address] [id number] [place] [address]-
6428     

 

 [facility] [place] [facility]  [address] [id number] [place] [address]-
5417     

 

 [facility] [place] [facility]  [address] [id number] [place] [address]-
4372     

 

 [facility] [place] [facility]  [address] [id number] [place] [address]-
2056     

 

 [facility] [place] [facility]  [address] [id number] [place] [address]-
1893     

 

 [facility] [place] [facility]  [address] [id number] [place] [address]-
6774  10 Dec, 2013   

 

 [facility] [place] [facility]  [address] [id number] [place] [address]-
4663  10 Dec, 2013   

 

 [facility] [place] [facility]  [address] [id number] [place] [address]-
9886     

 

 [facility]  [address] [facility] [id number] [place] [address]-
0693     







IMMUNIZATIONS

No Known Immunizations



SOCIAL HISTORY

Never Assessed



REASON FOR VISIT

Controlled Refill Request 



PLAN OF CARE





VITAL SIGNS





MEDICATIONS







 Medication  Instructions  Dosage  Frequency  Start Date  End Date  Duration  
Status

 

 Adderall XR 20 MG  Orally Once a day  1 capsule in the morning  24h  26 Oct, 
2017     28 days  Active

 

 Tramadol HCl 50 MG  Orally every 6 hrs  1 tablet as needed  6h  02 Dec, 2016  
   28 days  Active







RESULTS

No Results



PROCEDURES

No Known procedures



INSTRUCTIONS





MEDICATIONS ADMINISTERED

No Known Medications



MEDICAL (GENERAL) HISTORY







 Type  Description  Date

 

 Medical History  narcolepsy   

 

 Surgical History  Gallbladder removed  2015

 

 Surgical History  Hernia repair  2016

## 2018-09-02 NOTE — XMS REPORT
[facility]

 Created on: 2018



Sofiya Singh

External Reference #: [id number]

: 1955

Sex: Female



Demographics







 Address  [address] [place]  [address]

 

 Preferred Language  Unknown

 

 Marital Status  Unknown

 

 [employer] Affiliation  Unknown

 

 Race  Unknown

 

 Ethnic Group  Unknown





Author







 Author  NAHUN SNYDER

 

 Organization  [facility]

 

 Address  [address] [place]  [address]



 

 Phone  [phone]







Care Team Providers







 Care Team Member Name  Role  Phone

 

 NAHUN SNYDER  Unavailable  [phone]







PROBLEMS







 Type  Condition  ICD9-CM Code  ZHU21-PZ Code  Onset Dates  Condition Status  
SNOMED Code

 

 Problem  ADHD (attention deficit hyperactivity disorder), inattentive type     
F90.0     Active  85181296

 

 Problem  Arthritis     M19.90     Active  7872915

 

 Problem  Positive skin test for tuberculosis     R76.11     Active  363170341

 

 Problem  Lumbago with sciatica, left side     M54.42     Active  987916569

 

 Problem  Excessive daytime sleepiness     G47.19     Active  [id number]

 

 Problem  Primary narcolepsy without cataplexy     G47.419     Active  
[id number]

 

 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 
    Active  [id number]







ALLERGIES

No Information



ENCOUNTERS







 Encounter  Location  Date  Diagnosis

 

 [place] [id number] [place] [address] 75498-
8006    Right lower quadrant abdominal pain R10.31 and Rash R21

 

 [place] [id number] [place] [address] 43299-
1238  14 Mar, 2018  ADHD (attention deficit hyperactivity disorder), 
inattentive type F90.0 ; Lumbago with sciatica, left side M54.42 and Arthritis 
M19.90

 

 [facility]  [address] [place] [address] 35096-
7652    ADHD (attention deficit hyperactivity disorder), 
inattentive type F90.0 and Lumbar neuritis M54.16

 

 [facility]  [id number] [place] [address] 04124-
9352     

 

 [place] [id number] [place] [address] 30581-
5804    Lumbar neuritis M54.16

 

 [facility]  [address] [place] [address] 17213-
5245  16 2018  Low back pain M54.5

 

 [facility]  [id number] [place] [address] 46438-
0198  11 2018  ADHD (attention deficit hyperactivity disorder), 
inattentive type F90.0

 

 [facility]  [id number] [place] [address] 76052-
3264  09 2018  Positive skin test for tuberculosis R76.11

 

 [facility]  [id number] [place] [address] 53948-
9166    Positive skin test for tuberculosis R76.11

 

 [facility]  [id number] [place] [address] 67908-
2454     

 

 [facility]  [id number] [place] [address] 59836-
8370    Lumbar neuritis M54.16

 

 [facility]  [id number] [place] [address] 13086-
8857    ADHD (attention deficit hyperactivity disorder), 
inattentive type F90.0

 

 [facility]  [address] [place] [address] 51902-
7442     

 

 [facility]  [address] [place] [address] 24153-
5508  20 Dec, 2017  Lumbar neuritis M54.16

 

 [facility]  [id number] [place] [address] 98651-
1027  15 Dec, 2017  ADHD (attention deficit hyperactivity disorder), 
inattentive type F90.0

 

 [facility]  [id number] [place] [address] 10207-
9541  12 Dec, 2017   

 

 [facility]  [id number] [place] [address] 43691-
2180  11 Dec, 2017   

 

 [facility]  [id number] [place] [address] 79562-
5966    Lumbar neuritis M54.16

 

 [facility]  [id number] [place] [address] 54744-
2644    ADHD (attention deficit hyperactivity disorder), 
inattentive type F90.0 and Primary narcolepsy without cataplexy G47.419

 

 [facility]  [id number] [place] [address] 05993-
4697  10 Nov, 2017   

 

 [facility]  [id number] [place] [address] 01967-
4713     

 

 [facility]  [id number] [place] [address] 72067-
4901  23 Oct, 2017  Lumbar neuritis M54.16 and ADHD (attention deficit 
hyperactivity disorder), inattentive type F90.0

 

 [facility]  [id number] [place] [address] 99467-
2358  12 Oct, 2017   

 

 [facility]  [id number] [place] [address] 29696-
7483  26 Sep, 2017  Lumbar neuritis M54.16 and ADHD (attention deficit 
hyperactivity disorder), inattentive type F90.0

 

 [facility]  [id number] [place] [address] 24478-
6353  19 Sep, 2017   

 

 [facility]  [id number] [place] [address] 87702-
9980  31 Aug, 2017  ADHD (attention deficit hyperactivity disorder), 
inattentive type F90.0 and Lumbar neuritis M54.16

 

 [facility]  [id number] [place] [address] 97297-
6675  24 Aug, 2017  Lumbar neuritis M54.16

 

 [facility]  [id number] [place] [address] 21234-
1545  23 Aug, 2017   

 

 [facility]  [id number] [place] [address] 04145-
4466  02 Aug, 2017  ADHD (attention deficit hyperactivity disorder), 
inattentive type F90.0 and Lumbar neuritis M54.16

 

 [facility]  [address] [address]-
7262  02 Aug, 2017   

 

 [facility]  [address] [address]-
4394     

 

 [facility]  [address] [address]-
0528     

 

 [facility]  [id number] [place] [address] 93189-
0174     

 

 [facility]  [id number] [place] [address] 14701-
0958     

 

 [facility]  [id number] [place] [address] 25620-
8774    ADHD (attention deficit hyperactivity disorder), 
inattentive type F90.0 and Lumbar neuritis M54.16

 

 [facility]  [id number] [place] [address] 76915-
7120     

 

 [facility]  [address] [address]-
5427     

 

 [facility]  [id number] [place] [address] 51883-
2009    Lumbar neuritis M54.16 and ADHD (attention deficit 
hyperactivity disorder), inattentive type F90.0

 

 [facility]  [address] [address]-
7423     

 

 [facility]  [address] [address]-
7034  10 May, 2017  Lumbar neuritis M54.16 and ADHD (attention deficit 
hyperactivity disorder), inattentive type F90.0

 

 [facility]  [address] [address]-
1916  03 May, 2017   

 

 [facility]  [address] [address]-
5798  01 May, 2017   

 

 [facility]  [address] [place] [address] 82388-
5236    Narcolepsy due to underlying condition without cataplexy 
G47.429 and Lumbago with sciatica, left side M54.42

 

 [facility]  [id number] [place] [address] 42051-
2247  14 2017  Lumbar neuritis M54.16 and ADHD (attention deficit 
hyperactivity disorder), inattentive type F90.0

 

 [facility]  [id number] [place] [address] 61852-
1448  31 Mar, 2017   

 

 [facility]  [id number] [place] [address] 47281-
8074  15 Mar, 2017  Lumbar neuritis M54.16 and ADHD (attention deficit 
hyperactivity disorder), inattentive type F90.0

 

 [facility]  [id number] [place] [address] 86334-
1078  15 Mar, 2017   

 

 [facility]  [id number] [place] [address] 25545-
3806  06 Mar, 2017   

 

 [facility]  [id number] [place] [address] 54597-
3002  15 2017  ADHD (attention deficit hyperactivity disorder), 
inattentive type F90.0

 

 [facility]  [id number] [place] [address] 19097-
9407  15 2017  Lumbar neuritis M54.16

 

 [facility]  [id number] [place] [address] 63651-
4293  08 2017   

 

 [facility]  [id number] [place] [address] 46904-
4880  07 2017   

 

 [facility]  [id number] [place] [address] 04667-
2970    Attention deficit hyperactivity disorder (ADHD), 
predominantly inattentive type F90.0

 

 [facility]  [id number] [place] [address] 89972-
5749     

 

 [facility]  [address] [place] [address]-
6897  10 Niall, 2017   

 

 [facility]  [id number] [place] [address] 17284-
7223     

 

 [facility]  [id number] [place] [address] 72424-
7417  22 Dec, 2016  Attention deficit hyperactivity disorder (ADHD), 
predominantly inattentive type F90.0

 

 [facility]  [id number] [place] [address] 51887-
3663  12 Dec, 2016   

 

 [facility]  [address] [place] [address] 82317-
8211  07 Dec, 2016   

 

 [facility]  [id number] [place] [address] 33557-
5023  05 Dec, 2016   

 

 [facility]  [id number] [place] [address] 19213-
0697  05 Dec, 2016  Low TSH level R94.6

 

 [facility]  [address] [address]-
9786  05 Dec, 2016   

 

 [facility]  [address] [place] [address] 16396-
7073  02 Dec, 2016   

 

 [facility]  [id number] [place] [address] 05638-
0225  10 Nov, 2016   

 

 [facility]  [address] [address]-
7264  10 Nov, 2016   

 

 [facility]  [address] [address]-
3570     

 

 [facility]  [address] [address]-
0233  18 Oct, 2016  Low TSH level R94.6

 

 [facility]  [id number] [place] [address] 54217-
3159  17 Oct, 2016  Excessive daytime sleepiness G47.19 and ADHD (attention 
deficit hyperactivity disorder), inattentive type F90.0

 

 [facility]  [id number] [address]-
2504  13 Oct, 2016   

 

 [facility]  [address] [facility] [id number] [place] [address]-
5113  12 Oct, 2016   

 

 [facility] [place] [facility]  [address] [facility] [id number] [place] [address]-
4248  03 Oct, 2016   

 

 [facility]  [address] [facility] [id number] [place] [address]-
0725  28 Sep, 2016   

 

 [facility] [place] [facility]  [address] [facility] [id number] [place] [address]-
0148  14 Sep, 2016   

 

 [facility]  [address] [facility] [id number] [place] [address], [place] [address]-
5264  01 Sep, 2016   

 

 [facility] [place] [facility]  [id number] [place] [address]-
1692  17 Aug, 2016   

 

 [facility]  [address] [place] [address], [place] [address]-
0523  04 Aug, 2016   

 

 [facility]  [address] [facility] [id number] [place] [address]-
9268  03 Aug, 2016   

 

 [facility]  [address] [place] [address], [place] [address]-
1851    Lumbar neuritis M54.16

 

 [facility]  [address] [address]-
3913     

 

 [facility] [place] [facility]  [address] [address]-
0228     

 

 [facility] [place] [facility]  [address] [facility] [id number] [address]-
4514    Lumbar neuritis M54.16

 

 [facility]  [address] [facility] [id number] [place] [address]-
3514     

 

 [facility] [place] [facility]  [id number] [address]-
2110     

 

 [facility] [place] [facility]  [address] [address]-
8943  26 May, 2016  Lumbar neuritis M54.16

 

 [facility]  [address] [address]-
9001  25 May, 2016   

 

 [facility]  [id number] [address]-
2684  10 May, 2016   

 

 [facility]  [address] [place] [address] 35258-
8964    Lumbar neuritis M54.16

 

 [facility]  [address] [address]-
6817     

 

 [facility]  [id number] [place] [address] 77335-
9857     

 

 [facility]  [address] [place] [address] 11449-
7965  23 Mar, 2016   

 

 [facility]  [id number] [place] [address] 03680-
0108  14 Mar, 2016   

 

 [facility]  [address] [address]-
7572  14 Mar, 2016   

 

 [facility]  [address] [place] [address] 92052-
5772  11 Mar, 2016   

 

 [facility]  [address] [address]-
7053  24 2016   

 

 [facility]  [address] [address]-
1957    Inguinal hernia, right K40.90

 

 [facility]  [address] [address]-
6626  17 2016   

 

 [facility]  [address] [place] [address] 36498-
9532  15 2016  Low back pain M54.5 and Sciatica, unspecified side M54.30

 

 [facility]  [address] [address]-
4252     

 

 [facility] [place] [facility]  [address] [facility] [id number] [address]-
0405     

 

 [facility] [place] [facility]  [address] [facility] [id number] [place] [address], [place] [address]-
2292  30 Dec, 2015   

 

 [facility] [place] [facility]  [address] [facility] [id number] [place] [address]-
2215  28 Dec, 2015   

 

 [facility] [place] [facility]  [address] [facility] [id number] [place] [address], [place] [address]-
6629  02 Dec, 2015   

 

 [facility] [place] [facility]  [address] [facility] [id number] [place] [address], [place] [address]-
0106     

 

 [facility] [place] [facility]  [address] [facility] [id number] [place] [address] 39099-
9007     

 

 [facility]  [id number] [place] [address], [place] [address]-
9425     

 

 [facility] [place] [facility]  [id number] [place] [address] 90468-
6826     

 

 [facility] [place] [facility]  [address] [address]-
1953  26 Oct, 2015   

 

 [facility]  [address] [place] [address] 13801-
9954  22 Oct, 2015  Encounter for immunization Z23

 

 [facility]  [id number] [address]-
6951  07 Oct, 2015   

 

 [facility] [place] [facility]  [address] [address]-
8470  05 Oct, 2015   

 

 [facility] [place] [facility]  [address] [facility] [id number] [address]-
1897  09 Sep, 2015   

 

 [facility] [place] [facility]  [id number] [address]-
5801  08 Sep, 2015   

 

 [facility] [place] [facility]  [address] [address]-
2627  19 Aug, 2015  Lumbago 724.2

 

 [facility] [place] [facility]  [address] [id number] [place] [address]-
5141  12 Aug, 2015   

 

 [facility] [place] [facility]  [address] [id number] [place] [address]-
6945    Lumbago 724.2

 

 [facility] [place] [facility]  [address] [id number] [place] [address]-
5056     

 

 [facility] [place] [facility]  [address] [id number] [place] [address]-
1543     

 

 [facility] [place] [facility]  [address] [id number] [place] [address]-
4152     

 

 [facility] [place] [facility]  [address] [id number] [place] [address]-
1308     

 

 [facility] [place] [facility]  [address] [id number] [place] [address]-
4252     

 

 [facility] [place] [facility]  [address] [id number] [place] [address]-
4596  22 May, 2015   

 

 [facility] [place] [facility]  [address] [id number] [place] [address]-
9495     

 

 [facility] [place] [facility]  [address] [id number] [place] [address]-
3788     

 

 [facility] [place] [facility]  [address] [id number] [place] [address]-
7252  30 Mar, 2015   

 

 [facility] [place] [facility]  [address] [id number] [place] [address]-
3471  30 Mar, 2015   

 

 [facility] [place] [facility]  [address] [id number] [place] [address]-
1161  02 Mar, 2015   

 

 [facility] [place] [facility]  [address] [id number] [place]-
6263  02 Mar, 2015   

 

 [facility] [place] [facility]  [address] [id number] [place] [address]-
4851     

 

 [facility] [place] [facility]  [address] [id number] [place] [address]-
7923  ,    

 

 [facility] [place] [facility]  [address] [id number] [place] [address]-
8973     

 

 [facility] [place] [facility]  [address] [id number] [place] [address]-
5266     

 

 [facility] [place] [facility]  [address] [id number] [place] [address]-
4182     

 

 [facility] [place] [facility]  [address] [id number] [place] [address]-
7280     

 

 [facility] [place] [facility]  [address] [id number] [place]
5794  31 Dec, 2014   

 

 [facility] [place] [facility]  [address] [id number] [place] [address]-
1319  31 Dec, 2014   

 

 [facility] [place] [facility]  [address] [id number] [place] [address]-
5946  22 Dec, 2014   

 

 [facility] [place] [facility]  [address] [id number] [place] [address]-
0972  22 Dec, 2014   

 

 [facility] [place] [facility]  [address] [id number] [place] [address]-
1501  08 Dec, 2014   

 

 [facility] [place] [facility]  [address] [facility] [id number] [place] [address]-
2511  08 Dec, 2014   

 

 [facility] [place] [facility]  [address] [id number] [place] [address]-
4507  10 Nov, 2014   

 

 [facility] [place] [facility]  [address] [id number] [place] [address]-
6984  10 Nov, 2014   

 

 [facility] [place] [facility]  [address] [id number] [place] [address]-
6530  13 Oct, 2014   

 

 [facility] [place] [facility]  [address] [id number] [place]-
1339  13 Oct, 2014   

 

 [facility] [place] [facility]  [address] [id number] [place] [address]-
7070  01 Oct, 2014   

 

 [facility] [place] [facility]  [address] [id number] [place] [address]-
4902  01 Oct, 2014   

 

 [facility] [place] [facility]  [address] [id number] [place]
5764  15 Sep, 2014   

 

 [facility] [place] [facility]  [address] [id number] [place] [address]-
0250  15 Sep, 2014   

 

 [facility] [place] [facility]  [address] [id number] [place] [address]-
2483  18 Aug, 2014   

 

 [facility] [place] [facility]  [address] [id number] [place] [address]-
3289  18 Aug, 2014   

 

 [facility] [place] [facility]  [address] [id number] [place] [address]-
4172  18 Aug, 2014   

 

 [facility] [place] [facility]  [address] [id number] [place] [address]-
0306  18 Aug, 2014   

 

 [facility] [place] [facility]  [address] [id number] [place] [address]-
9759     

 

 [facility] [place] [facility]  [address] [id number] [place] [address]-
9559     

 

 [facility] [place] [facility]  [address] [id number] [place] [address]-
6214     

 

 [facility] [place] [facility]  [address] [id number] [place] [address]-
5829     

 

 [facility] [place] [facility]  [address] [id number] [place] [address]-
8449     

 

 [facility] [place] [facility]  [address] [id number] [place] [address]-
6448     

 

 [facility] [place] [facility]  [address] [id number] [place] [address]-
9662  30 May, 2014   

 

 [facility] [place] [facility]  [address] [id number] [place] [address]-
7958  28 May, 2014   

 

 [facility] [place] [facility]  [address] [id number] [place] [address]-
3686  28 May, 2014   

 

 [facility] [place] [facility]  [address] [id number] [place] [address]-
1578     

 

 [facility] [place] [facility]  [address] [id number] [place] [address]-
1143     

 

 [facility] [place] [facility]  [address] [id number] [place] [address]-
8856     

 

 [facility] [place] [facility]  [address] [id number] [place] [address]-
9911     

 

 [facility] [place] [facility]  [address] [id number] [place] [address]-
3049     

 

 [facility] [place] [facility]  [address] [id number] [place] [address]-
6886     

 

 [facility] [place] [facility]  [address] [id number] [place] [address]-
7477     

 

 [facility] [place] [facility]  [address] [id number] [place] [address]-
8880     

 

 [facility] [place] [facility]  [address] [id number] [place] [address]-
8761     

 

 [facility] [place] [facility]  [address] [id number] [place] [address]-
8476     

 

 [facility] [place] [facility]  [address] [id number] [place] [address]-
9286     

 

 [facility] [place] [facility]  [address] [id number] [place] [address]-
8354  28 Mar, 2014   

 

 [facility] [place] [facility]  [address] [id number] [place] [address]-
7952  27 Mar, 2014   

 

 [facility] [place] [facility]  [address] [id number] [place] [address]-
8283  27 Mar, 2014   

 

 [facility] [place] [facility]  [address] [id number] [place] [address]-
9814  26 Mar, 2014   

 

 [facility] [place] [facility]  [address] [id number] [place] [address]-
7232  26 Mar, 2014   

 

 [facility] [place] [facility]  [address] [id number] [place] [address]-
0335     

 

 [facility] [place] [facility]  [address] [id number] [place] [address]-
4626     

 

 [facility] [place] [facility]  [address] [id number] [place] [address]-
1946     

 

 [facility] [place] [facility]  [address] [id number] [place] [address]-
3010     

 

 [facility] [place] [facility]  [address] [id number] [place] [address]-
5974     

 

 [facility] [place] [facility]  [address] [id number] [place] [address]-
6945     

 

 [facility] [place] [facility]  [address] [id number] [place] [address]-
0764     

 

 [facility] [place] [facility]  [address] [id number] [place] [address]-
1611     

 

 [facility] [place] [facility]  [address] [facility] [id number] [place] [address]-
2616     

 

 [facility] [place] [facility]  [address] [id number] [place] [address]-
8514     

 

 [facility] [place] [facility]  [address] [id number] [place] [address]     

 

 [facility] [place] [facility]  [address] [id number] [place] [address]-
[address]     

 

 [facility] [place] [facility]  [address] [id number] [place] [address]-
[address]     

 

 [facility] [place] [facility]  [address] [id number] [place] [address]  10 Dec, 2013   

 

 [facility] [place] [facility]  [address] [id number] [place] [address]-
[address]  10 Dec, 2013   

 

 [facility]  [address] [facility] [id number] [place] [address]-
[address]     

 

 [facility]  [address] [facility] [id number] [place] [address]-
[address]     







IMMUNIZATIONS

No Known Immunizations



SOCIAL HISTORY

Never Assessed



REASON FOR VISIT

Controlled Med Refill 17



PLAN OF CARE





VITAL SIGNS





MEDICATIONS







 Medication  Instructions  Dosage  Frequency  Start Date  End Date  Duration  
Status

 

 Adderall XR 30 MG  Orally Once a day  1 capsule in the morning  24h  18 Dec, 
2017     28 days  Active







RESULTS

No Results



PROCEDURES

No Known procedures



INSTRUCTIONS





MEDICATIONS ADMINISTERED

No Known Medications



MEDICAL (GENERAL) HISTORY







 Type  Description  Date

 

 Medical History  narcolepsy   

 

 Surgical History  Gallbladder removed  2015

 

 Surgical History  Hernia repair  2016

## 2018-09-02 NOTE — XMS REPORT
[facility]

 Created on: 06/10/2018



Sofiya Singh

External Reference #: [id number]

: 1955

Sex: Female



Demographics







 Address  [address] [place]  [address]

 

 Preferred Language  Unknown

 

 Marital Status  Unknown

 

 Judaism Affiliation  Unknown

 

 Race  Unknown

 

 Ethnic Group  Unknown





Author







 Author  NAHUN SNYDER

 

 Organization  [facility]

 

 Address  [address] [place]  [address]



 

 Phone  [phone]







Care Team Providers







 Care Team Member Name  Role  Phone

 

 NAHUN SNYDER  Unavailable  [phone]







PROBLEMS







 Type  Condition  ICD9-CM Code  ESO95-RI Code  Onset Dates  Condition Status  
SNOMED Code

 

 Problem  ADHD (attention deficit hyperactivity disorder), inattentive type     
F90.0     Active  90877751

 

 Problem  Arthritis     M19.90     Active  1945896

 

 Problem  Positive skin test for tuberculosis     R76.11     Active  527327936

 

 Problem  Lumbago with sciatica, left side     M54.42     Active  709713298

 

 Problem  Excessive daytime sleepiness     G47.19     Active  393268923910

 

 Problem  Primary narcolepsy without cataplexy     G47.419     Active  
[id number]

 

 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 
    Active  [id number]







ALLERGIES

No Information



ENCOUNTERS







 Encounter  Location  Date  Diagnosis

 

 [place] [id number] [place] [address] 15000-
8126    Right lower quadrant abdominal pain R10.31 and Rash R21

 

 [place] [id number] [place] [address] 16785-
9325  14 Mar, 2018  ADHD (attention deficit hyperactivity disorder), 
inattentive type F90.0 ; Lumbago with sciatica, left side M54.42 and Arthritis 
M19.90

 

 [facility]  [address] [place] [address] 71578-
7782    ADHD (attention deficit hyperactivity disorder), 
inattentive type F90.0 and Lumbar neuritis M54.16

 

 [facility]  [id number] [place] [address] 61213-
3710     

 

 [place] [id number] [place] [address] 05579-
9852    Lumbar neuritis M54.16

 

 [facility]  [address] [place] [address] 40356-
3430  16 2018  Low back pain M54.5

 

 [facility]  [id number] [place] [address] 58455-
1988  11 2018  ADHD (attention deficit hyperactivity disorder), 
inattentive type F90.0

 

 [facility]  [id number] [place] [address] 23377-
2743  09 2018  Positive skin test for tuberculosis R76.11

 

 [facility]  [id number] [place] [address] 57670-
2465    Positive skin test for tuberculosis R76.11

 

 [facility]  [id number] [place] [address] 06298-
9431     

 

 [facility]  [id number] [place] [address] 19959-
3016    Lumbar neuritis M54.16

 

 [facility]  [id number] [place] [address] 09206-
4637    ADHD (attention deficit hyperactivity disorder), 
inattentive type F90.0

 

 [facility]  [address] [place] [address] 74289-
0309     

 

 [facility]  [address] [place] [address] 00841-
7677  20 Dec, 2017  Lumbar neuritis M54.16

 

 [facility]  [id number] [place] [address] 32857-
6433  15 Dec, 2017  ADHD (attention deficit hyperactivity disorder), 
inattentive type F90.0

 

 [facility]  [id number] [place] [address] 33352-
3025  12 Dec, 2017   

 

 [facility]  [id number] [place] [address] 34740-
2297  11 Dec, 2017   

 

 [facility]  [id number] [place] [address] 62647-
0489    Lumbar neuritis M54.16

 

 [facility]  [id number] [place] [address] 65261-
2028    ADHD (attention deficit hyperactivity disorder), 
inattentive type F90.0 and Primary narcolepsy without cataplexy G47.419

 

 [facility]  [id number] [place] [address] 58391-
3177  10 Nov, 2017   

 

 [facility]  [id number] [place] [address] 40502-
6278     

 

 [facility]  [id number] [place] [address] 28049-
7468  23 Oct, 2017  Lumbar neuritis M54.16 and ADHD (attention deficit 
hyperactivity disorder), inattentive type F90.0

 

 [facility]  [id number] [place] [address] 37574-
3717  12 Oct, 2017   

 

 [facility]  [id number] [place] [address] 80033-
9705  26 Sep, 2017  Lumbar neuritis M54.16 and ADHD (attention deficit 
hyperactivity disorder), inattentive type F90.0

 

 [facility]  [id number] [place] [address] 67245-
3147  19 Sep, 2017   

 

 [facility]  [id number] [place] [address] 17063-
3581  31 Aug, 2017  ADHD (attention deficit hyperactivity disorder), 
inattentive type F90.0 and Lumbar neuritis M54.16

 

 [facility]  [id number] [place] [address] 50304-
4752  24 Aug, 2017  Lumbar neuritis M54.16

 

 [facility]  [id number] [place] [address] 69046-
8461  23 Aug, 2017   

 

 [facility]  [id number] [place] [address] 45661-
8070  02 Aug, 2017  ADHD (attention deficit hyperactivity disorder), 
inattentive type F90.0 and Lumbar neuritis M54.16

 

 [facility]  [address] [address]-
3512  02 Aug, 2017   

 

 [facility]  [address] [address]-
5625     

 

 [facility]  [address] [address]-
5618     

 

 [facility]  [id number] [place] [address] 72277-
1753     

 

 [facility]  [id number] [place] [address] 81752-
7619     

 

 [facility]  [address] [place] [address] 75887-
8464    ADHD (attention deficit hyperactivity disorder), 
inattentive type F90.0 and Lumbar neuritis M54.16

 

 [facility]  [id number] [place] [address] 44416-
0064     

 

 [facility]  [address] [address]-
2539     

 

 [facility]  [address] [place] [address] 43640-
7906    Lumbar neuritis M54.16 and ADHD (attention deficit 
hyperactivity disorder), inattentive type F90.0

 

 [facility]  [address] [address]-
2083     

 

 [facility]  [address] [address]-
2587  10 May, 2017  Lumbar neuritis M54.16 and ADHD (attention deficit 
hyperactivity disorder), inattentive type F90.0

 

 [facility]  [address] 776027-
8816  03 May, 2017   

 

 [facility]  [address] [address]-
3127  01 May, 2017   

 

 [facility]  [address] [place] [address] 84206-
9419    Narcolepsy due to underlying condition without cataplexy 
G47.429 and Lumbago with sciatica, left side M54.42

 

 [facility]  [id number] [place] [address] 06365-
3210  14 2017  Lumbar neuritis M54.16 and ADHD (attention deficit 
hyperactivity disorder), inattentive type F90.0

 

 [facility]  [address] [place] [address] 72676-
2432  31 Mar, 2017   

 

 [facility]  [address] [place] [address] 21461-
5755  15 Mar, 2017  Lumbar neuritis M54.16 and ADHD (attention deficit 
hyperactivity disorder), inattentive type F90.0

 

 [facility]  [address] [place] [address] 57857-
2082  15 Mar, 2017   

 

 [facility]  [address] [place] [address] 15258-
7119  06 Mar, 2017   

 

 [facility]  [address] [place] [address] 89209-
5574  15 2017  ADHD (attention deficit hyperactivity disorder), 
inattentive type F90.0

 

 [facility]  [address] [place] [address] 32884-
8263  15 2017  Lumbar neuritis M54.16

 

 [facility]  [address] [place] [address] 50599-
2154  08 2017   

 

 [facility]  [address] [place] [address] 49306-
9834  07 2017   

 

 [facility]  [address] [place] [address] 45344-
6894    Attention deficit hyperactivity disorder (ADHD), 
predominantly inattentive type F90.0

 

 [facility]  [address] [place] [address] 48334-
3916     

 

 [facility]  [address] [place] [address]-
0988  10 Niall, 2017   

 

 [facility]  [address] [place] [address] 56893-
9586     

 

 [facility]  [address] [place] [address] 15518-
4908  22 Dec, 2016  Attention deficit hyperactivity disorder (ADHD), 
predominantly inattentive type F90.0

 

 [facility]  [address] [place] [address] 34590-
2711  12 Dec, 2016   

 

 [facility]  [address] [place] [address] 98789-
3664  07 Dec, 2016   

 

 [facility]  [address] [place] [address] 28541-
1094  05 Dec, 2016   

 

 [facility]  [address] [place] [address] 90920-
8448  05 Dec, 2016  Low TSH level R94.6

 

 [facility]  [address] [address]-
4269  05 Dec, 2016   

 

 [facility]  [address] [place] [address] 37187-
6414  02 Dec, 2016   

 

 [facility]  [address] [place] [address] 10020-
3675  10 Nov, 2016   

 

 [facility]  [address] [address]-
2032  10 Nov, 2016   

 

 [facility]  [address] [address]-
7268     

 

 [facility]  [address] [address]-
7709  18 Oct, 2016  Low TSH level R94.6

 

 [facility]  [address] [place] [address] 15704-
5832  17 Oct, 2016  Excessive daytime sleepiness G47.19 and ADHD (attention 
deficit hyperactivity disorder), inattentive type F90.0

 

 [facility]  [id number] [place] [address]-
4753  13 Oct, 2016   

 

 [facility]  [address] [facility] [id number] [place] [address]-
1340  12 Oct, 2016   

 

 [facility] [place] [facility]  [address] [facility] [id number] [place] [address]-
7211  03 Oct, 2016   

 

 [facility]  [address] [facility] [id number] [place] [address]-
1878  28 Sep, 2016   

 

 [facility] [place] [facility]  [address] [facility] [id number] [place] [address]-
0909  14 Sep, 2016   

 

 [facility]  [address] [facility] [id number] [place] [address], [place] [address]-
1838  01 Sep, 2016   

 

 [facility] [place] [facility]  [id number] [place] [address]-
4774  17 Aug, 2016   

 

 [facility]  [address] [place] [address], [place] [address]-
5957  04 Aug, 2016   

 

 [facility]  [address] [facility] [id number] [place] [address]-
7991  03 Aug, 2016   

 

 [facility]  [address] [place] [address], [place] [address]-
0879    Lumbar neuritis M54.16

 

 [facility]  [address] [address]-
4308     

 

 [facility] [place] [facility]  [address] [place] [address]-
6905     

 

 [facility] [place] [facility]  [address] [facility] [id number] [address]-
4151    Lumbar neuritis M54.16

 

 [facility]  [address] [facility] [id number] [place] [address]-
0186     

 

 [facility] [place] [facility]  [id number] [place] [address]-
3819     

 

 [facility] [place] [facility]  [address] [address]-
5499  26 May, 2016  Lumbar neuritis M54.16

 

 [facility]  [address] [address]-
6038  25 May, 2016   

 

 [facility]  [address] [address]-
4536  10 May, 2016   

 

 [facility]  [address] [place] [address] 78543-
3112    Lumbar neuritis M54.16

 

 [facility]  [address] [address]-
4142     

 

 [facility]  [address] [place] [address] 87964-
1063     

 

 [facility]  [address] [place] [address] 27001-
7864  23 Mar, 2016   

 

 [facility]  [address] [place] [address] 33798-
2209  14 Mar, 2016   

 

 [facility]  [address] [address]-
0514  14 Mar, 2016   

 

 [facility]  [address] [place] [address] 70467-
8899  11 Mar, 2016   

 

 [facility]  [address] [address]-
0443  24 2016   

 

 [facility]  [address] [address]-
1688    Inguinal hernia, right K40.90

 

 [facility]  [address] [address]-
3097  17 2016   

 

 [facility]  [address] [place] [address] 38074-
8629  15 2016  Low back pain M54.5 and Sciatica, unspecified side M54.30

 

 [facility]  [address] [address]-
0810     

 

 [facility] [place] [facility]  [address] [facility] [id number] [address]-
0614     

 

 [facility] [place] [facility]  [address] [facility] [id number] [place] [address], [place] [address]-
7599  30 Dec, 2015   

 

 [facility] [place] [facility]  [address] [facility] [id number] [place] [address]-
0602  28 Dec, 2015   

 

 [facility] [place] [facility]  [address] [facility] [id number] [place] [address], [place] [address]-
7052  02 Dec, 2015   

 

 [facility] [place] [facility]  [address] [facility] [id number] [place] [address], [place] [address]-
1437     

 

 [facility] [place] [facility]  [address] [facility] [id number] [place] [address] 97792-
5322     

 

 [facility]  [id number] [place] [address], [place] [address]-
3713     

 

 [facility] [place] [facility]  [address] [place] [address] 95751-
8617     

 

 [facility] [place] [facility]  [address] [address]-
5097  26 Oct, 2015   

 

 [facility]  [address] [place] [address] 63065-
8752  22 Oct, 2015  Encounter for immunization Z23

 

 [facility]  [id number] [address]-
5332  07 Oct, 2015   

 

 [facility] [place] [facility]  [address], [place] [address]-
7934  05 Oct, 2015   

 

 [facility] [place] [facility]  [address] [facility] [id number] [address]-
0716  09 Sep, 2015   

 

 [facility] [place] [facility]  [id number] [address]-
1521  08 Sep, 2015   

 

 [facility] [place] [facility]  [address] [address]-
9123  19 Aug, 2015  Lumbago 724.2

 

 [facility] [place] [facility]  [address] [id number] [place] [address]-
1219  12 Aug, 2015   

 

 [facility] [place] [facility]  [address] [id number] [place] [address]-
6628    Lumbago 724.2

 

 [facility] [place] [facility]  [address] [id number] [place] [address]-
2596     

 

 [facility] [place] [facility]  [address] [id number] [place] [address]-
9536     

 

 [facility] [place] [facility]  [address] [id number] [place] [address]-
0762     

 

 [facility] [place] [facility]  [address] [id number] [place] [address]-
4236     

 

 [facility] [place] [facility]  [address] [id number] [place] [address]-
0354     

 

 [facility] [place] [facility]  [address] [id number] [place] [address]-
0589  22 May, 2015   

 

 [facility] [place] [facility]  [address] [id number] [place] [address]-
6904     

 

 [facility] [place] [facility]  [address] [id number] [place] [address]-
1836     

 

 [facility] [place] [facility]  [address] [id number] [place] [address]-
9873  30 Mar, 2015   

 

 [facility] [place] [facility]  [address] [id number] [place] [address]-
7777  30 Mar, 2015   

 

 [facility] [place] [facility]  [address] [id number] [place] [address]-
9297  02 Mar, 2015   

 

 [facility] [place] [facility]  [address] [id number] [place]
6674  02 Mar, 2015   

 

 [facility] [place] [facility]  [address] [id number] [place] [address]-
7723     

 

 [facility] [place] [facility]  [address] [id number] [place] [address]-
0991  ,    

 

 [facility] [place] [facility]  [address] [id number] [place] [address]-
7205     

 

 [facility] [place] [facility]  [address] [id number] [place] [address]-
3776     

 

 [facility] [place] [facility]  [address] [id number] [place] [address]-
9403     

 

 [facility] [place] [facility]  [address] [id number] [place] [address]-
9138     

 

 [facility] [place] [facility]  [address] [id number] [place]
0156  31 Dec, 2014   

 

 [facility] [place] [facility]  [address] [id number] [place] [address]-
1606  31 Dec, 2014   

 

 [facility] [place] [facility]  [address] [id number] [place] [address]-
4811  22 Dec, 2014   

 

 [facility] [place] [facility]  [address] [id number] [place] [address]-
6238  22 Dec, 2014   

 

 [facility] [place] [facility]  [address] [id number] [place] [address]-
8722  08 Dec, 2014   

 

 [facility] [place] [facility]  [address] [facility] [id number] [place] [address]-
1262  08 Dec, 2014   

 

 [facility] [place] [facility]  [address] [id number] [place] [address]-
4708  10 Nov, 2014   

 

 [facility] [place] [facility]  [address] [id number] [place] [address]-
3712  10 Nov, 2014   

 

 [facility] [place] [facility]  [address] [id number] [place] [address]-
9210  13 Oct, 2014   

 

 [facility] [place] [facility]  [address] [id number] [place]-
6731  13 Oct, 2014   

 

 [facility] [place] [facility]  [address] [id number] [place] [address]-
3156  01 Oct, 2014   

 

 [facility] [place] [facility]  [address] [id number] [place] [address]-
9149  01 Oct, 2014   

 

 [facility] [place] [facility]  [address] [id number] [place]-
7538  15 Sep, 2014   

 

 [facility] [place] [facility]  [address] [id number] [place] [address]-
1323  15 Sep, 2014   

 

 [facility] [place] [facility]  [address] [id number] [place] [address]-
8806  18 Aug, 2014   

 

 [facility] [place] [facility]  [address] [id number] [place] [address]-
0017  18 Aug, 2014   

 

 [facility] [place] [facility]  [address] [id number] [place] [address]-
8731  18 Aug, 2014   

 

 [facility] [place] [facility]  [address] [id number] [place] [address]-
4199  18 Aug, 2014   

 

 [facility] [place] [facility]  [address] [id number] [place] [address]-
0695     

 

 [facility] [place] [facility]  [address] [id number] [place] [address]-
0811     

 

 [facility] [place] [facility]  [address] [id number] [place] [address]-
9688     

 

 [facility] [place] [facility]  [address] [id number] [place] [address]-
0864     

 

 [facility] [place] [facility]  [address] [id number] [place] [address]-
0772     

 

 [facility] [place] [facility]  [address] [id number] [place] [address]-
3624     

 

 [facility] [place] [facility]  [address] [id number] [place] [address]-
8927  30 May, 2014   

 

 [facility] [place] [facility]  [address] [id number] [place] [address]-
9549  28 May, 2014   

 

 [facility] [place] [facility]  [address] [id number] [place] [address]-
5242  28 May, 2014   

 

 [facility] [place] [facility]  [address] [id number] [place] [address]-
6348     

 

 [facility] [place] [facility]  [address] [id number] [place] [address]-
4744     

 

 [facility] [place] [facility]  [address] [id number] [place] [address]-
5586     

 

 [facility] [place] [facility]  [address] [id number] [place] [address]-
5515     

 

 [facility] [place] [facility]  [address] [id number] [place] [address]-
9023     

 

 [facility] [place] [facility]  [address] [id number] [place] [address]-
6207     

 

 [facility] [place] [facility]  [address] [id number] [place] [address]-
2533     

 

 [facility] [place] [facility]  [address] [id number] [place] [address]-
6083     

 

 [facility] [place] [facility]  [address] [id number] [place] [address]-
7923     

 

 [facility] [place] [facility]  [address] [id number] [place] [address]-
0075     

 

 [facility] [place] [facility]  [address] [id number] [place] [address]-
1989     

 

 [facility] [place] [facility]  [address] [id number] [place] [address]-
4354  28 Mar, 2014   

 

 [facility] [place] [facility]  [address] [id number] [place] [address]-
9553  27 Mar, 2014   

 

 [facility] [place] [facility]  [address] [id number] [place] [address]-
7623  27 Mar, 2014   

 

 [facility] [place] [facility]  [address] [id number] [place] [address]-
3224  26 Mar, 2014   

 

 [facility] [place] [facility]  [address] [id number] [place] [address]-
[address]32  26 Mar, 2014   

 

 [facility] [place] [facility]  [address] [id number] [place] [address]-
7905     

 

 [facility] [place] [facility]  [address] [id number] [place] [address]-
1016     

 

 [facility] [place] [facility]  [address] [id number] [place] [address]-
1026     

 

 [facility] [place] [facility]  [address] [id number] [place] [address]-
5652     

 

 [facility] [place] [facility]  [address] [id number] [place] [address]-
5100     

 

 [facility] [place] [facility]  [address] [id number] [place] [address]-
7168     

 

 [facility] [place] [facility]  [address] [id number] [place] [address]-
3205     

 

 [facility] [place] [facility]  [address] [id number] [place] [address]-
9865     

 

 [facility] [place] [facility]  [address] [facility] [id number] [place] [address]-
3097     

 

 [facility] [place] [facility]  [address] [id number] [place] [address]-
3834     

 

 [facility] [place] [facility]  [address] [id number] [place] [address]-
[id number]     

 

 [facility] [place] [facility]  [address] [id number] [place] [address]     

 

 [facility] [place] [facility]  [address] [id number] [place] [address]-
8670     

 

 [facility] [place] [facility]  [address] [id number] [place] [address]
5143  10 Dec, 2013   

 

 [facility] [place] [facility]  [address] [id number] [place] [address]-
2546  10 Dec, 2013   

 

 [facility]  [address] [facility] [id number] [place] [address]-
2546     

 

 [facility]  [address] [facility] [id number] [place] [address]-
9626     







IMMUNIZATIONS

No Known Immunizations



SOCIAL HISTORY

Never Assessed



REASON FOR VISIT

Controlled Med Refill 17



PLAN OF CARE





VITAL SIGNS





MEDICATIONS







 Medication  Instructions  Dosage  Frequency  Start Date  End Date  Duration  
Status

 

 Tramadol HCl 50 MG  Orally every 6 hrs  1 tablet as needed  6h  02 Dec, 2016  
   28 days  Active







RESULTS

No Results



PROCEDURES

No Known procedures



INSTRUCTIONS





MEDICATIONS ADMINISTERED

No Known Medications



MEDICAL (GENERAL) HISTORY







 Type  Description  Date

 

 Medical History  narcolepsy   

 

 Surgical History  Gallbladder removed  2015

 

 Surgical History  Hernia repair  2016

## 2018-09-02 NOTE — XMS REPORT
[facility]

 Created on: 2015



Samantha Danykath

External Reference #: [id number]

: 1955

Sex: Female



Demographics







 Address  [address] [place]  [address]

 

 Home Phone  [phone]

 

 Preferred Language  Unknown

 

 Marital Status  Unknown

 

 Mormonism Affiliation  Unknown

 

 Race  White

 

 Ethnic Group  Not  or 





Author







 NAHUN Valente

 

 Organization  [employer]

 

 Address  Unknown

 

 Phone  Unavailable







Care Team Providers







 Care Team Member Name  Role  Phone

 

 NAHUN SNYDER  CP  Unavailable



                                                                



Allergies, Adverse Reactions, Alerts

          





 Substance  Reaction  Event Type

 

 Penicillins  Info Not Available  Non Drug Allergy



                                                                               
         



Problems

          





 Problem Type  Condition  ICD-9 Code  Onset Dates  Condition Status

 

 Problem  Screening examination for pulmonary tuberculosis  V74.1     Active

 

 Problem  Pain in joint, pelvic region and thigh  719.45     Active

 

 Problem  Pneumonia, organism unspecified  486     Active

 

 Assessment  Lumbago  724.2     Active

 

 Problem  Family history of ischemic heart disease  V17.3     Active

 

 Problem  Family history of other cardiovascular diseases  V17.49     Active

 

 Problem  Unspecified arthropathy, site unspecified  716.90     Active

 

 Problem  Anxiety state, unspecified  300.00     Active

 

 Problem  Lumbago  724.2     Active

 

 Problem  Family history of malignant neoplasm of breast  V16.3     Active

 

 Problem  Family history of diabetes mellitus  V18.0     Active



                                                                               
                                                                               
                              



Medications

          





 Medication  Code System  Code  Instructions  Start Date  End Date  Status  
Dosage

 

 Alprazolam  NDC  [address]-10  1 MG    2015        take 1 tablet (1 
mg) by oral route 3 times per day

 

 tramadol  NDC  0  50 mg    2015        take 1 tablet (50 mg) by oral 
route every 6 hours as needed

 

 Hydrocodone-Acetaminophen  NDC  [address]-05   MG    2015    
    take 1 tablet by oral route every 6 hours as needed for pain PRN



                                                                               
                             



Procedures

          





 Procedure  Coding System  Code  Date

 

 Office Visit, Est Pt., Level 3  CPT-4  74973  Aug 19, 2015



                                                                               
                   



Vital Signs

          





 Date/Time:  Aug 19, 2015

 

 Temperature  97.8 F

 

 Weight  125.9 lbs

 

 Height  59 in

 

 BMI  25.43 Index

 

 Blood Pressure Diastolic  78 mmHg

 

 Blood Pressure Systolic  120 mmHg

 

 Cardiac Monitoring Heart Rate  72 bpm



                                                                              



Results

          No Known Results                                                     
               



Summary Purpose

          [employer] Submission

## 2018-09-02 NOTE — XMS REPORT
[facility]

 Created on: 2016



Samantha Danykath

External Reference #: [id number]

: 1955

Sex: Female



Demographics







 Address  [address] [place]  [address]

 

 Home Phone  [phone]

 

 Preferred Language  Unknown

 

 Marital Status  Unknown

 

 Hinduism Affiliation  Unknown

 

 Race  White

 

 Ethnic Group  Not  or 





Author







 Author  NAHUN SNYDER

 

 Organization  [employer]

 

 Address  Unknown

 

 Phone  Unavailable







Care Team Providers







 Care Team Member Name  Role  Phone

 

 NAHUN SNYDER  CP  Unavailable



                                                                



Allergies, Adverse Reactions, Alerts

          





 Substance  Reaction  Event Type

 

 Penicillins  Info Not Available  Non Drug Allergy



                                                                               
         



Problems

          





 Problem Type  Condition  Code  Onset Dates  Condition Status

 

 Problem  Screening examination for pulmonary tuberculosis  V74.1     Active

 

 Problem  Pain in joint, pelvic region and thigh  719.45     Active

 

 Problem  Pneumonia, organism unspecified  486     Active

 

 Assessment  Lumbar neuritis  M54.16     Active

 

 Problem  Family history of ischemic heart disease  V17.3     Active

 

 Problem  Family history of other cardiovascular diseases  V17.49     Active

 

 Problem  Unspecified arthropathy, site unspecified  716.90     Active

 

 Problem  Anxiety state, unspecified  300.00     Active

 

 Problem  Lumbago  724.2     Active

 

 Problem  Family history of malignant neoplasm of breast  V16.3     Active

 

 Problem  Family history of diabetes mellitus  V18.0     Active



                                                                               
                                                                               
                              



Medications

          





 Medication  Code System  Code  Instructions  Start Date  End Date  Status  
Dosage

 

 tramadol  NDC  0  50 mg    2015        take 1 tablet (50 mg) by oral 
route every 6 hours as needed

 

 Hydrocodone-Acetaminophen  NDC  [address]-12   MG  every 6 hrs          1 tablet as needed

 

 Alprazolam  NDC  [address]-10  1 MG  Three times a day  2015        
1 tablet



                                                                               
                             



Procedures

          





 Procedure  Coding System  Code  Date

 

 Office Visit, Est Pt., Level 3  CPT-4  22784  2016



                                                                               
                   



Vital Signs

          





 Date/Time:  2016

 

 Temperature  98.5 F

 

 Weight  125.0 lbs

 

 Height  59 in

 

 BMI  25.24 Index

 

 Blood Pressure Diastolic  72 mmHg

 

 Blood Pressure Systolic  144 mmHg

 

 Cardiac Monitoring Heart Rate  76 bpm



                                                                              



Results

          No Known Results                                                     
               



Summary Purpose

          [employer] Submission

## 2018-09-02 NOTE — XMS REPORT
[facility]

 Created on: 2018



Sofiya Singh

External Reference #: [id number]

: 1955

Sex: Female



Demographics







 Address  [address] [place]  [address]

 

 Preferred Language  Unknown

 

 Marital Status  Unknown

 

 [employer] Affiliation  Unknown

 

 Race  Unknown

 

 Ethnic Group  Unknown





Author







 Author  KOURTNEY OBYLE

 

 [facility]

 

 Address  [address] [place]  [address]



 

 Phone  [phone]







Care Team Providers







 Care Team Member Name  Role  Phone

 

 KOURTNEY BOYLE  Unavailable  [phone]







PROBLEMS







 Type  Condition  ICD9-CM Code  [id number] Code  Onset Dates  Condition Status  
SNOMED Code

 

 Problem  ADHD (attention deficit hyperactivity disorder), inattentive type     
F90.0     Active  21785293

 

 Problem  Arthritis     M19.90     Active  9067053

 

 Problem  Positive skin test for tuberculosis     R76.11     Active  056959501

 

 Problem  Lumbago with sciatica, left side     M54.42     Active  847638623

 

 Problem  Excessive daytime sleepiness     G47.19     Active  154974380684

 

 Problem  Primary narcolepsy without cataplexy     G47.419     Active  
[id number]

 

 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 
    Active  [id number]







ALLERGIES

No Information



ENCOUNTERS







 Encounter  Location  Date  Diagnosis

 

 [place] [address] [address]
4053  14 May, 2018   

 

 [place] [address] [place] [id number] [place] [address] 89226-
5117  14 Mar, 2018  ADHD (attention deficit hyperactivity disorder), 
inattentive type F90.0 ; Lumbago with sciatica, left side M54.42 and Arthritis 
M19.90

 

 [facility]  [id number] [place] [address] 26075-
7034    ADHD (attention deficit hyperactivity disorder), 
inattentive type F90.0 and Lumbar neuritis M54.16

 

 [facility]  [id number] [place] [address] 83634-
7168     

 

 [facility]  [id number] [place] [address] 68774-
0918  16 2018  Lumbar neuritis M54.16

 

 [facility]  [address] [place] [address] 07710-
8624  16 2018  Low back pain M54.5

 

 [facility]  [address] [place] [address] 50513-
6310  11 2018  ADHD (attention deficit hyperactivity disorder), 
inattentive type F90.0

 

 [facility]  [id number] [place] [address] 57781-
0566  09 2018  Positive skin test for tuberculosis R76.11

 

 [facility]  [id number] [place] [address] 25502-
6955    Positive skin test for tuberculosis R76.11

 

 [facility]  [address] [place] [address] 21774-
7281     

 

 [facility]  [address] [place] [address] 31320-
5989    Lumbar neuritis M54.16

 

 [facility]  [address] [place] [address] 23556-
7403    ADHD (attention deficit hyperactivity disorder), 
inattentive type F90.0

 

 [facility]  [address] [place] [address] 27791-
8531     

 

 [facility]  [address] [place] [address] 54271-
8839  20 Dec, 2017  Lumbar neuritis M54.16

 

 [facility]  [address] [place] [address] 13230-
9391  15 Dec, 2017  ADHD (attention deficit hyperactivity disorder), 
inattentive type F90.0

 

 [facility]  [address] [place] [address] 16368-
3976  12 Dec, 2017   

 

 [facility]  [address] [place] [address] 05107-
6877  11 Dec, 2017   

 

 [facility]  [address] [place] [address] 67962-
9597    Lumbar neuritis M54.16

 

 [facility]  [address] [place] [address] 23912-
1440    ADHD (attention deficit hyperactivity disorder), 
inattentive type F90.0 and Primary narcolepsy without cataplexy G47.419

 

 [facility]  [address] [place] [address] 48647-
2892  10 Nov, 2017   

 

 [facility]  [address] [place] [address] 27349-
6036     

 

 [facility]  [address] [place] [address] 69606-
7613  23 Oct, 2017  Lumbar neuritis M54.16 and ADHD (attention deficit 
hyperactivity disorder), inattentive type F90.0

 

 [facility]  [address] [place] [address] 83683-
0543  12 Oct, 2017   

 

 [facility]  [address] [place] [address] 23458-
1615  26 Sep, 2017  Lumbar neuritis M54.16 and ADHD (attention deficit 
hyperactivity disorder), inattentive type F90.0

 

 [facility]  [address] [place] [address] 08310-
6826  19 Sep, 2017   

 

 [facility]  [address] [place] [address] 64458-
0322  31 Aug, 2017  ADHD (attention deficit hyperactivity disorder), 
inattentive type F90.0 and Lumbar neuritis M54.16

 

 [facility]  [address] [place] [address] 28188-
5053  24 Aug, 2017  Lumbar neuritis M54.16

 

 [facility]  [address] [place] [address] 23571-
7593  23 Aug, 2017   

 

 [facility]  [address] [place] [address] 69860-
4288  02 Aug, 2017  ADHD (attention deficit hyperactivity disorder), 
inattentive type F90.0 and Lumbar neuritis M54.16

 

 [facility]  [address] [address]-
6349  02 Aug, 2017   

 

 [facility]  [id number] [address]-
6216     

 

 [facility]  [address]00565100[place] [address]-
6708     

 

 [facility]  [address] [place] [address] 70226-
4434     

 

 [facility]  [address] [place] [address] 02645-
0270     

 

 [facility]  [address] [place] [address] 37867-
2693    ADHD (attention deficit hyperactivity disorder), 
inattentive type F90.0 and Lumbar neuritis M54.16

 

 [facility]  [address] [place] [address] 14494-
5373     

 

 [facility]  [id number] [address]-
9024     

 

 [facility]  [address] [place] [address] 39415-
3755    Lumbar neuritis M54.16 and ADHD (attention deficit 
hyperactivity disorder), inattentive type F90.0

 

 [facility]  [address] [address]-
1636     

 

 [facility]  [address] [address]-
4785  10 May, 2017  Lumbar neuritis M54.16 and ADHD (attention deficit 
hyperactivity disorder), inattentive type F90.0

 

 [facility]  [id number] [address]-
3270  03 May, 2017   

 

 [facility]  [address] [address]-
7478  01 May, 2017   

 

 [facility]  [address] [place] [address] 36089-
2690    Narcolepsy due to underlying condition without cataplexy 
G47.429 and Lumbago with sciatica, left side M54.42

 

 [facility]  [address] [place] [address] 02534-
2134    Lumbar neuritis M54.16 and ADHD (attention deficit 
hyperactivity disorder), inattentive type F90.0

 

 [facility]  [address] [address]608-
2814  31 Mar, 2017   

 

 [facility]  [address] [place] [address] 38298-
4263  15 Mar, 2017  Lumbar neuritis M54.16 and ADHD (attention deficit 
hyperactivity disorder), inattentive type F90.0

 

 [facility]  [address] [place] [address] 98922-
6251  15 Mar, 2017   

 

 [facility]  [address] 539[address]-
9181  06 Mar, 2017   

 

 [facility]  [address] [place] [address] 77371-
4131  15 2017  ADHD (attention deficit hyperactivity disorder), 
inattentive type F90.0

 

 [facility]  [address] [place] [address] 14788-
5777  15 2017  Lumbar neuritis M54.16

 

 [facility]  [address] [place] [address] 73764-
8807  08 2017   

 

 [facility]  [address] [place] [address] 88605-
6331     

 

 [facility]  [address] [place] [address] 46442-
4519    Attention deficit hyperactivity disorder (ADHD), 
predominantly inattentive type F90.0

 

 [facility]  [address] [place] [address] 82718-
4193     

 

 [facility]  [address] [place] [address] 93587-
0563  10 Niall, 2017   

 

 [facility]  [address] [address]-
6986     

 

 [facility]  [address] [place] [address] 763449-
4285  22 Dec, 2016  Attention deficit hyperactivity disorder (ADHD), 
predominantly inattentive type F90.0

 

 [facility]  [address] [place] [address] 23016-
6213  12 Dec, 2016   

 

 [facility]  [address] [place] [address] 58003-
0735  07 Dec, 2016   

 

 [facility]  [address] [place] [address] 46990-
0577  05 Dec, 2016   

 

 [facility]  [address] [place] [address] 94743-
0435  05 Dec, 2016  Low TSH level R94.6

 

 [facility]  [address] [place] [address] 60596-
7521  05 Dec, 2016   

 

 [facility]  [address] [place] [address] 76418-
7901  02 Dec, 2016   

 

 [facility]  [address] [place] [address] 05511-
7413  10 Nov, 2016   

 

 [facility]  [address] [address]-
3194  10 Nov, 2016   

 

 [facility]  [address] [place] [address] 34180-
3642     

 

 [facility]  [address] [place] [address] 27450-
8950  18 Oct, 2016  Low TSH level R94.6

 

 [facility]  [address] [place] [address] 74832-
3066  17 Oct, 2016  Excessive daytime sleepiness G47.19 and ADHD (attention 
deficit hyperactivity disorder), inattentive type F90.0

 

 [facility]  [address] [place] [address] 40138-
8002  13 Oct, 2016   

 

 [facility]  [address] [id number] [place] [address]-
2713  12 Oct, 2016   

 

 [facility]  [address] [id number] [place] [address]-
7528  03 Oct, 2016   

 

 [facility]  [address] [id number] [place] [address]-
4806  28 Sep, 2016   

 

 [facility]  [address] [id number] [place] [address]-
1508  14 Sep, 2016   

 

 [facility]  [address] [id number] [place] [address]-
4001  01 Sep, 2016   

 

 [facility] [place] [facility]  [address] [facility] [id number] [place] [address]-
5578  17 Aug, 2016   

 

 [facility]  [address] [id number] [place] [address]-
1090  04 Aug, 2016   

 

 [facility]  [address] [facility] [id number] [place] [address]-
2094  03 Aug, 2016   

 

 [facility]  [address] [facility] [id number] [place] [address]-
3760    Lumbar neuritis M54.16

 

 [facility]  [address] [facility] [id number] [place] [address]-
2906     

 

 [facility] [place] [facility]  [address] [id number] [place] [address]-
8061     

 

 [facility] [place] [facility]  [address] [facility] [id number] [place] [address]-
1311    Lumbar neuritis M54.16

 

 [facility]  [address] [facility] [id number] [place]
5462     

 

 [facility] [place] [facility]  [address] [facility] [id number] [place] [address]-
0584     

 

 [facility]  [id number] [place] [address]-
8976  26 May, 2016  Lumbar neuritis M54.16

 

 [facility]  [address] [place] [address]-
6055  25 May, 2016   

 

 [facility]  [address] [place] [address] 04585-
2763  10 May, 2016   

 

 [facility]  [id number] [place] [address], [place] [address]-
3117    Lumbar neuritis M54.16

 

 [facility]  [address] [id number] [place] [address], [place] [address]-
9637     

 

 [facility]  [id number] [place] [address], [place] [address]-
7325     

 

 [facility]  [address] [place] [address] 32231-
2369  23 Mar, 2016   

 

 [facility]  [address] [place] [address] 16969-
3094  14 Mar, 2016   

 

 [facility]  [address] [place] [address] 19433-
6048  14 Mar, 2016   

 

 [facility]  [address] [place] [address] 40347-
5700  11 Mar, 2016   

 

 [facility]  [address]00565[address] [address]-
3397  24 2016   

 

 [facility]  [address] [place] [address] 20351-
1067    Inguinal hernia, right K40.90

 

 [facility]  [address]65[address] [address]-
3033  17 2016   

 

 [facility]  [id number] [address]-
2071  15 2016  Low back pain M54.5 and Sciatica, unspecified side M54.30

 

 [facility]  [address] [address]-
6140     

 

 [facility]  [address] [id number] [place] [address]-
5988     

 

 [facility] [place] [facility]  [address] [facility] [id number] [place] [address]-
3908  30 Dec, 2015   

 

 [facility] [place] [facility]  [address] [facility] [id number] [place] [address]-
9563  28 Dec, 2015   

 

 [facility] [place] [facility]  [address] [facility] [id number] [place] [address], [place] [address]-
3102  02 Dec, 2015   

 

 [facility] [place] [facility]  [address] [id number] [place] [address]-
3741     

 

 [facility] [place] [facility]  [address] [place] [address], [place] [address]-
6854     

 

 [facility]  [address] [place] [address]-
7743     

 

 [facility] [place] [facility]  [address] [place] [address], [place] [address]-
3927     

 

 [facility]  [id number] [place] [address]-
3582  26 Oct, 2015   

 

 [facility]  [address] [address]-
8011  22 Oct, 2015  Encounter for immunization Z23

 

 [facility]  [id number] [place] [address]-
8038  07 Oct, 2015   

 

 [facility] [place] [facility]  [id number] [address]-
3544  05 Oct, 2015   

 

 [facility] [place] [facility]  [address] [facility] [id number] [place] [address]-
1597  09 Sep, 2015   

 

 [facility] [place] [facility]  [id number] [address]-
3489  08 Sep, 2015   

 

 [facility] [place] [facility]  [id number] [place] [address]-
1760  19 Aug, 2015  Lumbago 724.2

 

 [facility]  [address] [id number] [place] [address]-
3862  12 Aug, 2015   

 

 [facility] [place] [facility]  [address] [id number] [place] [address]-
1010    Lumbago 724.2

 

 [facility] [place] [facility]  [address] [id number] [place] [address]-
9051     

 

 [facility] [place] [facility]  [address] [id number] [place] [address]-
3869     

 

 [facility] [place] [facility]  [address] [id number] [place] [address]-
3944     

 

 [facility] [place] [facility]  [address] [id number] [place] [address]-
8746     

 

 [facility] [place] [facility]  [address] [id number] [place] [address]-
8097     

 

 [facility] [place] [facility]  [address] [id number] [place] [address]-
6443  22 May, 2015   

 

 [facility] [place] [facility]  [address] [id number] [place] [address]-
1384     

 

 [facility] [place] [facility]  [address] [id number] [place] [address]-
3426     

 

 [facility] [place] [facility]  [address] [id number] [place] [address]-
5990  30 Mar, 2015   

 

 [facility] [place] [facility]  [address] [id number] [place] [address]-
3251  30 Mar, 2015   

 

 [facility] [place] [facility]  [address] [id number] [place] [address]-
8669  02 Mar, 2015   

 

 [facility] [place] [facility]  [address] [id number] [place] [address]-
3965  02 Mar, 2015   

 

 [facility] [place] [facility]  [address] [id number] [place] [address]-
8122     

 

 [facility] [place] [facility]  [address] [id number] [address]-
9594     

 

 [facility] [place] [facility]  [address] [id number] [place] [address]-
2940     

 

 [facility] [place] [facility]  [address] [id number] [place] [address]-
0020     

 

 [facility] [place] [facility]  [address] [facility] [id number] [place] [address]-
9497     

 

 [facility] [place] [facility]  [address] [id number] [place] [address]-
8254     

 

 [facility] [place] [facility]  [address] [id number] [place] [address]-
9384  31 Dec, 2014   

 

 [facility] [place] [facility]  [address] [id number] [place] [address]-
5450  31 Dec, 2014   

 

 [facility] [place] [facility]  [address] [id number] [place] [address]-
8351  22 Dec, 2014   

 

 [facility] [place] [facility]  [address] [id number] [place] [address]-
7752  22 Dec, 2014   

 

 [facility] [place] [facility]  [address] [id number] [place] [address]-
7425  08 Dec, 2014   

 

 [facility] [place] [facility]  [address] [facility] [id number] [place] [address]-
8007  08 Dec, 2014   

 

 [facility] [place] [facility]  [address] [id number] [address]-
6982  10 Nov, 2014   

 

 [facility] [place] [facility]  [address] [id number] [address]-
2341  10 Nov, 2014   

 

 [facility] [place] [facility]  [address] [id number] [address]-
7087  13 Oct, 2014   

 

 [facility] [place] [facility]  [address] [id number] [address]-
5999  13 Oct, 2014   

 

 [facility] [place] [facility]  [address] [facility] [id number] [place] [address]-
4488  01 Oct, 2014   

 

 [facility] [place] [facility]  [address] [id number] [place] [address]-
9467  01 Oct, 2014   

 

 [facility] [place] [facility]  [address] [id number] [place] [address]-
9321  15 Sep, 2014   

 

 [facility] [place] [facility]  [address] [id number] [place] [address]-
8846  15 Sep, 2014   

 

 [facility] [place] [facility]  [address] [id number] [place] [address]-
7911  18 Aug, 2014   

 

 [facility] [place] [facility]  [address] [id number] [place] [address]-
8424  18 Aug, 2014   

 

 [facility] [place] [facility]  [address] [id number] [place] [address]-
7889  18 Aug, 2014   

 

 [facility] [place] [facility]  [address] [id number] [place] [address]-
5753  18 Aug, 2014   

 

 [facility] [place] [facility]  [address] [id number] [place] [address]-
6179     

 

 [facility] [place] [facility]  [address] [id number] [place] [address]-
5335     

 

 [facility] [place] [facility]  [address] [id number] [place] [address]-
1064     

 

 [facility] [place] [facility]  [address] [id number] [place] [address]-
9852     

 

 [facility] [place] [facility]  [address] [id number] [place] [address]-
6609     

 

 [facility] [place] [facility]  [address] [id number] [place] [address]-
5824     

 

 [facility] [place] [facility]  [address] [id number] [place] [address]-
5605  30 May, 2014   

 

 [facility] [place] [facility]  [address] [id number] [place] [address]-
6555  28 May, 2014   

 

 [facility] [place] [facility]  [address] [id number] [place] [address]-
0968  28 May, 2014   

 

 [facility] [place] [facility]  [address] [id number] [place] [address]-
4890     

 

 [facility] [place] [facility]  [address] [id number] [place] [address]-
9925     

 

 [facility] [place] [facility]  [address] [id number] [place] [address]-
2868     

 

 [facility] [place] [facility]  [address] [id number] [place] [address]-
7368     

 

 [facility] [place] [facility]  [address] [id number] [place] [address]-
4524     

 

 [facility] [place] [facility]  [address] [id number] [place] [address]-
4468     

 

 [facility] [place] [facility]  [address] [id number] [place] [address]-
8500     

 

 [facility] [place] [facility]  [address] [id number] [place] [address]-
1409     

 

 [facility] [place] [facility]  [address] [id number] [place] [address]-
3259     

 

 [facility] [place] [facility]  [address] [id number] [place] [address]-
[address]     

 

 [facility] [place] [facility]  [address] [id number] [place] [address]-
[address]     

 

 [facility] [place] [facility]  [address] [id number] [place] [address]-
[address]  28 Mar, 2014   

 

 [facility] [place] [facility]  [address] [id number] [place] [address]-
[address]  27 Mar, 2014   

 

 [facility] [place] [facility]  [address] [id number] [place] [address]-
[address]  27 Mar, 2014   

 

 [facility] [place] [facility]  [address] [id number] [place] [address]-
[address]  26 Mar, 2014   

 

 [facility] [place] [facility]  [address] [id number] [place] [address]-
[address]40  26 Mar, 2014   

 

 [facility] [place] [facility]  [address] [id number] [place] [address]-
6866     

 

 [facility] [place] [facility]  [address] [id number] [place] [address]-
7436     

 

 [facility] [place] [facility]  [address] [id number] [place] [address]-
9416     

 

 [facility] [place] [facility]  [address] [id number] [place] [address]
2545     

 

 [facility] [place] [facility]  [address] [id number] [place] [address]-
2846     

 

 [facility] [place] [facility]  [address] [id number] [place] [address]-
8836     

 

 [facility] [place] [facility]  [address] [id number] [place] [address]-
0942     

 

 [facility] [place] [facility]  [address] [id number] [place] [address]-
9045     

 

 [facility] [place] [facility]  [address] [id number] [place] [address]-
2233     

 

 [facility] [place] [facility]  [address] [facility] [id number] [place] [address]-
4024     

 

 [facility] [place] [facility]  [address] [id number] [place] [address]-
2545     

 

 [facility] [place] [facility]  [address] [id number] [place] [address]-
2546     

 

 [facility] [place] [facility]  [address] [id number] [place] [address]
5326     

 

 [facility] [place] [facility]  [address] [id number] [place] [address]-
2546  10 Dec, 2013   

 

 [facility] [place] [facility]  [address] [id number] [place] [address]-
2542  10 Dec, 2013   

 

 [facility]  [address] [facility] [id number] [place] [address]-
0486     

 

 [facility]  [address] [facility] [id number] [place] [address]-
9946     







IMMUNIZATIONS

No Known Immunizations



SOCIAL HISTORY

Never Assessed



REASON FOR VISIT

Controlled Med Refill 17



PLAN OF CARE





VITAL SIGNS





MEDICATIONS







 Medication  Instructions  Dosage  Frequency  Start Date  End Date  Duration  
Status

 

 Adderall XR 20 MG  Orally Once a day  1 capsule in the morning  24h  01 Sep, 
2017     28 days  Active

 

 Tramadol HCl 50 MG  Orally every 6 hrs  1 tablet as needed  6h  02 Dec, 2016  
   28 days  Active







RESULTS

No Results



PROCEDURES

No Known procedures



INSTRUCTIONS





MEDICATIONS ADMINISTERED

No Known Medications



MEDICAL (GENERAL) HISTORY







 Type  Description  Date

 

 Medical History  narcolepsy   

 

 Surgical History  Gallbladder removed  2015

 

 Surgical History  Hernia repair  2016

## 2018-09-02 NOTE — XMS REPORT
[facility]

 Created on: 2016



Samantha Danykath

External Reference #: [id number]

: 1955

Sex: Female



Demographics







 Address  [address] [place]  [address]

 

 Home Phone  [phone]

 

 Preferred Language  Unknown

 

 Marital Status  Unknown

 

 Quaker Affiliation  Unknown

 

 Race  White

 

 Ethnic Group  Not  or 





Author







 Author  NAHUN SNYDER

 

 Organization  [employer]

 

 Address  Unknown

 

 Phone  Unavailable







Care Team Providers







 Care Team Member Name  Role  Phone

 

 NAHUN SNYDER  CP  Unavailable



                                                                



Allergies

          No Known Allergies                                                   
                                     



Problems

          





 Problem Type  Condition  Code  Onset Dates  Condition Status

 

 Problem  Screening examination for pulmonary tuberculosis  V74.1     Active

 

 Problem  Pain in joint, pelvic region and thigh  719.45     Active

 

 Problem  Pneumonia, organism unspecified  486     Active

 

 Problem  Family history of ischemic heart disease  V17.3     Active

 

 Problem  Family history of other cardiovascular diseases  V17.49     Active

 

 Problem  Unspecified arthropathy, site unspecified  716.90     Active

 

 Problem  Anxiety state, unspecified  300.00     Active

 

 Problem  Lumbago  724.2     Active

 

 Problem  Family history of malignant neoplasm of breast  V16.3     Active

 

 Problem  Family history of diabetes mellitus  V18.0     Active



                                                                               
                                                                               
                    



Medications

          





 Medication  Code System  Code  Instructions  Start Date  End Date  Status  
Dosage

 

 tramadol  NDC  0  50 mg    2015        take 1 tablet (50 mg) by oral 
route every 6 hours as needed

 

 Alprazolam  NDC  [address]-10  1 MG    2015        take 1 tablet (1 
mg) by oral route 3 times per day

 

 Hydrocodone-Acetaminophen  NDC  [address]-34   MG    2015    
    take 1 tablet by oral route every 6 hours as needed for pain PRN



                                                                               
                   



Results

          No Known Results                                                     
               



Summary Purpose

          [employer] Submission

## 2018-09-02 NOTE — XMS REPORT
[facility]

 Created on: 2015



Samantha Danykath

External Reference #: [id number]

: 1955

Sex: Female



Demographics







 Address  [address] [place]  [address]

 

 Home Phone  [phone]

 

 Preferred Language  Unknown

 

 Marital Status  Unknown

 

 Spiritism Affiliation  Unknown

 

 Race  White

 

 Ethnic Group  Not  or 





Author







 Author  NAHUN SNYDER

 

 Organization  [employer]

 

 Address  Unknown

 

 Phone  Unavailable







Care Team Providers







 Care Team Member Name  Role  Phone

 

 NAHUN SNYDER  CP  Unavailable



                                                                



Allergies

          No Known Allergies                                                   
                                     



Problems

          





 Problem Type  Condition  Code  Onset Dates  Condition Status

 

 Problem  Screening examination for pulmonary tuberculosis  V74.1     Active

 

 Problem  Pain in joint, pelvic region and thigh  719.45     Active

 

 Problem  Pneumonia, organism unspecified  486     Active

 

 Problem  Family history of ischemic heart disease  V17.3     Active

 

 Problem  Family history of other cardiovascular diseases  V17.49     Active

 

 Problem  Unspecified arthropathy, site unspecified  716.90     Active

 

 Problem  Anxiety state, unspecified  300.00     Active

 

 Problem  Lumbago  724.2     Active

 

 Problem  Family history of malignant neoplasm of breast  V16.3     Active

 

 Problem  Family history of diabetes mellitus  V18.0     Active



                                                                               
                                                                               
                    



Medications

          





 Medication  Code System  Code  Instructions  Start Date  End Date  Status  
Dosage

 

 Alprazolam  NDC  [address]-10  1 MG    2015        take 1 tablet (1 
mg) by oral route 3 times per day

 

 tramadol  NDC  0  50 mg    2015        take 1 tablet (50 mg) by oral 
route every 6 hours as needed

 

 Hydrocodone-Acetaminophen  NDC  [address]-22   MG    2015    
    take 1 tablet by oral route every 6 hours as needed for pain PRN



                                                                               
                   



Results

          No Known Results                                                     
               



Summary Purpose

          [employer] Submission

## 2018-09-02 NOTE — XMS REPORT
[facility]

 Created on: 2016



Samantha Danykath

External Reference #: [id number]

: 1955

Sex: Female



Demographics







 Address  [address] [place]  [address]

 

 Home Phone  [phone]

 

 Preferred Language  Unknown

 

 Marital Status  Unknown

 

 Evangelical Affiliation  Unknown

 

 Race  White

 

 Ethnic Group  Not  or 





Author







 Author  NAHUN SNYDER

 

 Organization  [employer]

 

 Address  Unknown

 

 Phone  Unavailable







Care Team Providers







 Care Team Member Name  Role  Phone

 

 NAHUN SNYDER  CP  Unavailable



                                                                



Allergies

          No Known Allergies                                                   
                                     



Problems

          





 Problem Type  Condition  Code  Onset Dates  Condition Status

 

 Problem  Screening examination for pulmonary tuberculosis  V74.1     Active

 

 Problem  Pain in joint, pelvic region and thigh  719.45     Active

 

 Problem  Pneumonia, organism unspecified  486     Active

 

 Problem  Family history of ischemic heart disease  V17.3     Active

 

 Problem  Family history of other cardiovascular diseases  V17.49     Active

 

 Problem  Unspecified arthropathy, site unspecified  716.90     Active

 

 Problem  Anxiety state, unspecified  300.00     Active

 

 Problem  Lumbago  724.2     Active

 

 Problem  Family history of malignant neoplasm of breast  V16.3     Active

 

 Problem  Family history of diabetes mellitus  V18.0     Active



                                                                               
                                                                               
                    



Medications

          





 Medication  Code System  Code  Instructions  Start Date  End Date  Status  
Dosage

 

 tramadol  NDC  0  50 mg    2015        take 1 tablet (50 mg) by oral 
route every 6 hours as needed



                                                                              



Results

          No Known Results                                                     
               



Summary Purpose

          [employer] Submission

## 2018-09-02 NOTE — XMS REPORT
[facility]

 Created on: 10/05/2017



Sofiya Singh

External Reference #: [id number]

: 1955

Sex: Female



Demographics







 Address  [address] [place]  [address]

 

 Preferred Language  Unknown

 

 Marital Status  Unknown

 

 Hindu Affiliation  Unknown

 

 Race  Unknown

 

 Ethnic Group  Unknown





Author







 Author  NAHUN SNYDER

 

 Organization  [facility]

 

 Address  [address] [place]  [address]



 

 Phone  [phone]







Care Team Providers







 Care Team Member Name  Role  Phone

 

 NAHUN SNYDER  Unavailable  [phone]







PROBLEMS







 Type  Condition  ICD9-CM Code  HAA94-EW Code  Onset Dates  Condition Status  
SNOMED Code

 

 Problem  Lumbago with sciatica, left side     M54.42     Active  725223588

 

 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 
    Active  77423378314836

 

 Problem  ADHD (attention deficit hyperactivity disorder), inattentive type     
F90.0     Active  34788271

 

 Problem  Excessive daytime sleepiness     G47.19     Active  588750056157







ALLERGIES

No Information



SOCIAL HISTORY

Never Assessed



PLAN OF CARE





VITAL SIGNS





MEDICATIONS







 Medication  Instructions  Dosage  Frequency  Start Date  End Date  Duration  
Status

 

 Tramadol HCl 50 mg  Orally every 6 hrs  1 tablet as needed  6h  02 Dec, 2016  
   28 days  Active

 

 Adderall XR 20 mg  Orally Once a day  1 capsule in the morning  24h  17 Mar, 
2017     28 days  Active







RESULTS

No Results



PROCEDURES

No Known procedures



IMMUNIZATIONS

No Known Immunizations



MEDICAL (GENERAL) HISTORY







 Type  Description  Date

 

 Surgical History  Gallbladder removed  2015

 

 Surgical History  Hernia repair  2016

## 2018-09-02 NOTE — XMS REPORT
[facility]

 Created on: 10/18/2016



Sofiya Singh

External Reference #: [id number]

: 1955

Sex: Female



Demographics







 Address  [address] [place]  [address]

 

 Home Phone  [phone]

 

 Preferred Language  Unknown

 

 Marital Status  Unknown

 

 Synagogue Affiliation  Unknown

 

 Race  White

 

 Ethnic Group  Not  or 





Author







 NAHUN Valente

 

 Organization  [employer]

 

 Address  Unknown

 

 Phone  Unavailable







Care Team Providers







 Care Team Member Name  Role  Phone

 

 NAHUN SNYDER  CP  Unavailable



                                                                



Allergies

          No Known Allergies                                                   
                                     



Problems

          





 Problem Type  Condition  Code  Onset Dates  Condition Status

 

 Problem  Pain in joint, pelvic region and thigh  719.45     Active

 

 Problem  Anxiety state, unspecified  300.00     Active

 

 Problem  Lumbago  724.2     Active

 

 Problem  ADHD (attention deficit hyperactivity disorder), inattentive type  
F90.0     Active

 

 Problem  Unspecified arthropathy, site unspecified  716.90     Active

 

 Problem  Excessive daytime sleepiness  G47.19     Active

 

 Problem  Family history of malignant neoplasm of breast  V16.3     Active

 

 Problem  Family history of diabetes mellitus  V18.0     Active

 

 Problem  Family history of ischemic heart disease  V17.3     Active

 

 Problem  Family history of other cardiovascular diseases  V17.49     Active

 

 Assessment  Low TSH level  R94.6     Active

 

 Problem  Screening examination for pulmonary tuberculosis  V74.1     Active

 

 Problem  Pneumonia, organism unspecified  486     Active



                                                                               
                                                                               
                                                  



Medications

          No Known Medications                                                 
                             



Results

          No Known Results                                                     
               



Summary Purpose

          [employer] Submission

## 2018-09-02 NOTE — XMS REPORT
[facility]

 Created on: 10/01/2017



Sofiya Singh

External Reference #: [id number]

: 1955

Sex: Female



Demographics







 Address  [address] [place]  [address]

 

 Preferred Language  Unknown

 

 Marital Status  Unknown

 

 Jew Affiliation  Unknown

 

 Race  Unknown

 

 Ethnic Group  Unknown





Author







 Author  NAHUN SNYDER

 

 Organization  [facility]

 

 Address  [address] [place]  [address]



 

 Phone  [phone]







Care Team Providers







 Care Team Member Name  Role  Phone

 

 NAHUN SNYDER  Unavailable  [phone]







PROBLEMS







 Type  Condition  ICD9-CM Code  ELV04-VV Code  Onset Dates  Condition Status  
SNOMED Code

 

 Problem  Lumbago with sciatica, left side     M54.42     Active  411826424

 

 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 
    Active  [id number]

 

 Problem  ADHD (attention deficit hyperactivity disorder), inattentive type     
F90.0     Active  88698637

 

 Problem  Excessive daytime sleepiness     G47.19     Active  774352226635







ALLERGIES

No Information



SOCIAL HISTORY

Never Assessed



PLAN OF CARE





VITAL SIGNS





MEDICATIONS







 Medication  Instructions  Dosage  Frequency  Start Date  End Date  Duration  
Status

 

 Alprazolam 1 MG  Orally Three times a day  1 tablet  8h  30 Mar, 2015     28 
days  Active

 

 Hydrocodone-Acetaminophen  MG  Orally every 6 hrs  1 tablet as needed  
6h  07 Mar, 2017     28 days  Active







RESULTS

No Results



PROCEDURES

No Known procedures



IMMUNIZATIONS

No Known Immunizations



MEDICAL (GENERAL) HISTORY







 Type  Description  Date

 

 Surgical History  Gallbladder removed  2015

 

 Surgical History  Hernia repair  2016

## 2018-09-02 NOTE — XMS REPORT
[facility]

 Created on: 2016



Sofiya Singh

External Reference #: [id number]

: 1955

Sex: Female



Demographics







 Address  [address] [place]  [address]

 

 Home Phone  [phone]

 

 Preferred Language  Unknown

 

 Marital Status  Unknown

 

 Druze Affiliation  Unknown

 

 Race  White

 

 Ethnic Group  Not  or 





Author







 Author  NAHUN SNYDER

 

 Organization  [employer]

 

 Address  Unknown

 

 Phone  Unavailable







Care Team Providers







 Care Team Member Name  Role  Phone

 

 NAHUN SNYDER  CP  Unavailable



                                                                



Allergies

          No Known Allergies                                                   
                                     



Problems

          





 Problem Type  Condition  Code  Onset Dates  Condition Status

 

 Problem  Screening examination for pulmonary tuberculosis  V74.1     Active

 

 Problem  Pain in joint, pelvic region and thigh  719.45     Active

 

 Problem  Pneumonia, organism unspecified  486     Active

 

 Problem  Family history of ischemic heart disease  V17.3     Active

 

 Problem  Family history of other cardiovascular diseases  V17.49     Active

 

 Problem  Unspecified arthropathy, site unspecified  716.90     Active

 

 Problem  Anxiety state, unspecified  300.00     Active

 

 Problem  Lumbago  724.2     Active

 

 Problem  Family history of malignant neoplasm of breast  V16.3     Active

 

 Problem  Family history of diabetes mellitus  V18.0     Active



                                                                               
                                                                               
                    



Medications

          





 Medication  Code System  Code  Instructions  Start Date  End Date  Status  
Dosage

 

 tramadol  NDC  0  50 mg    2015        take 1 tablet (50 mg) by oral 
route every 6 hours as needed



                                                                              



Results

          No Known Results                                                     
               



Summary Purpose

          [employer] Submission

## 2018-09-02 NOTE — XMS REPORT
[facility]

 Created on: 2018



Sofiya Singh

External Reference #: [id number]

: 1955

Sex: Female



Demographics







 Address  [address] [place]  [address]

 

 Preferred Language  Unknown

 

 Marital Status  Unknown

 

 Caodaism Affiliation  Unknown

 

 Race  Unknown

 

 Ethnic Group  Unknown





Author







 Author  NAHUN SNYDER

 

 Organization  [facility]

 

 Address  [address] [place]  [address]



 

 Phone  [phone]







Care Team Providers







 Care Team Member Name  Role  Phone

 

 NAHUN SNYDER  Unavailable  [phone]







PROBLEMS







 Type  Condition  ICD9-CM Code  [id number] Code  Onset Dates  Condition Status  
SNOMED Code

 

 Problem  ADHD (attention deficit hyperactivity disorder), inattentive type     
F90.0     Active  90718766

 

 Problem  Arthritis     M19.90     Active  0550505

 

 Problem  Positive skin test for tuberculosis     R76.11     Active  690172029

 

 Problem  Lumbago with sciatica, left side     M54.42     Active  025698152

 

 Problem  Excessive daytime sleepiness     G47.19     Active  200884049167

 

 Problem  Primary narcolepsy without cataplexy     G47.419     Active  
[id number]

 

 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 
    Active  [id number]







ALLERGIES

No Information



ENCOUNTERS







 Encounter  Location  Date  Diagnosis

 

 [place] [address] [address]-
[address]  14 Mar, 2018  ADHD (attention deficit hyperactivity disorder), 
inattentive type F90.0 ; Lumbago with sciatica, left side M54.42 and Arthritis 
M19.90

 

 [place] [address] [place] [id number] [place] [address]-
3460    ADHD (attention deficit hyperactivity disorder), 
inattentive type F90.0 and Lumbar neuritis M54.16

 

 [place] [address] [place] [id number] [place] [address] 27588-
1760     

 

 [place] [address] [address]-
7084  16 2018  Lumbar neuritis M54.16

 

 [place] [address] [place] [id number] [place] [address] 89419-
7668    Low back pain M54.5

 

 [facility]  [address] [place] [address] 41533-
7583    ADHD (attention deficit hyperactivity disorder), 
inattentive type F90.0

 

 [facility]  [id number] [place] [address] 49846-
3788    Positive skin test for tuberculosis R76.11

 

 [facility]  [id number] [place] [address] 91351-
6022    Positive skin test for tuberculosis R76.11

 

 [facility]  [id number] [place] [address] 98136-
0476     

 

 [facility]  [id number] [place] [address] 12689-
3259    Lumbar neuritis M54.16

 

 [facility]  [id number] [place] [address] 29393-
2478    ADHD (attention deficit hyperactivity disorder), 
inattentive type F90.0

 

 [facility]  [id number] [place] [address] 40155-
1641     

 

 [facility]  [id number] [place] [address] 43145-
8453  20 Dec, 2017  Lumbar neuritis M54.16

 

 [facility]  [id number] [place] [address]-
0559  15 Dec, 2017  ADHD (attention deficit hyperactivity disorder), 
inattentive type F90.0

 

 [facility]  [address] [place] [address] 02750-
8654  12 Dec, 2017   

 

 [facility]  [address] [place] [id number] [place] [address] 48566-
9806  11 Dec, 2017   

 

 [facility]  [address] [place] [id number] [place] [address] 14579-
5314    Lumbar neuritis M54.16

 

 [facility]  [address] [place] [id number] [place] [address] 35739-
3098    ADHD (attention deficit hyperactivity disorder), 
inattentive type F90.0 and Primary narcolepsy without cataplexy G47.419

 

 [facility]  [address] [place] [id number] [place] [address] 65294-
3365  10 Nov, 2017   

 

 [facility]  [id number] [place] [address] 70770-
3887     

 

 [facility]  [address] [place] [id number] [place] [address] 47929-
0324  23 Oct, 2017  Lumbar neuritis M54.16 and ADHD (attention deficit 
hyperactivity disorder), inattentive type F90.0

 

 [facility]  [address] [place] [id number] [place] [address] 86805-
0189  12 Oct, 2017   

 

 [facility]  [address] [place] [id number] [place] [address] 40994-
8661  26 Sep, 2017  Lumbar neuritis M54.16 and ADHD (attention deficit 
hyperactivity disorder), inattentive type F90.0

 

 [facility]  [address] [place] [id number] [place] [address] 10225-
2011  19 Sep, 2017   

 

 [facility]  [id number] [place] [address] 49296-
1662  31 Aug, 2017  ADHD (attention deficit hyperactivity disorder), 
inattentive type F90.0 and Lumbar neuritis M54.16

 

 [facility]  [address] [place] [id number] [place] [address] 90091-
6296  24 Aug, 2017  Lumbar neuritis M54.16

 

 [facility]  [address] [place] [id number] [place] [address] 70629-
7242  23 Aug, 2017   

 

 [facility]  [address] [place] [id number] [place] [address] 05751-
8557  02 Aug, 2017  ADHD (attention deficit hyperactivity disorder), 
inattentive type F90.0 and Lumbar neuritis M54.16

 

 [facility]  [address] [place] [id number] [place] [address] 17462-
7818  02 Aug, 2017   

 

 [facility]  [address] [place] [address] [address]-
4725     

 

 [facility]  [address] [address]-
2928     

 

 [facility]  [address] [address]-
3076     

 

 [facility]  [address] [id number] [place] [address] 06320-
3240     

 

 [facility]  [address] [place] [id number] [place] [address] 35226-
8087    ADHD (attention deficit hyperactivity disorder), 
inattentive type F90.0 and Lumbar neuritis M54.16

 

 [facility]  [address] [address]-
1970     

 

 [facility]  [address] [place] [id number] [place] [address] 19684-
1000     

 

 [facility]  [address] [place] [address] 11134-
0990    Lumbar neuritis M54.16 and ADHD (attention deficit 
hyperactivity disorder), inattentive type F90.0

 

 [facility]  [address] [address]-
0592     

 

 [facility]  [address] [address]-
2848  10 May, 2017  Lumbar neuritis M54.16 and ADHD (attention deficit 
hyperactivity disorder), inattentive type F90.0

 

 [facility]  [address] [address]-
8672  03 May, 2017   

 

 [facility]  [address] [address]-
6034  01 May, 2017   

 

 [facility]  [address] [address]-
1978    Narcolepsy due to underlying condition without cataplexy 
G47.429 and Lumbago with sciatica, left side M54.42

 

 [facility] [place] [facility]  [address] [address]-
1109  14 2017  Lumbar neuritis M54.16 and ADHD (attention deficit 
hyperactivity disorder), inattentive type F90.0

 

 [facility]  [address] [place] [id number] [address]-
8546  31 Mar, 2017   

 

 [facility]  [address] [place] [id number] [place] [address] 77100-
2058  15 Mar, 2017  Lumbar neuritis M54.16 and ADHD (attention deficit 
hyperactivity disorder), inattentive type F90.0

 

 [facility]  [address] [place] [id number] [place] [address] 83084-
5935  15 Mar, 2017   

 

 [facility]  [id number] [place] [address] 96414-
0476  06 Mar, 2017   

 

 [facility]  [address] [place] [id number] [place] [address] 99636-
2717  15 2017  ADHD (attention deficit hyperactivity disorder), 
inattentive type F90.0

 

 [facility]  [address] [place] [address] 78011-
0344  15 2017  Lumbar neuritis M54.16

 

 [facility]  [address] [place] [id number] [address]-
5573  08 2017   

 

 [facility]  [id number] [place] [address] 94553-
8328     

 

 [facility]  [address] [place] [id number] [place] [address] 69212-
5057    Attention deficit hyperactivity disorder (ADHD), 
predominantly inattentive type F90.0

 

 [facility]  [address] [place] [id number] [place] [address] 01247-
1756     

 

 [facility]  [address] [address]-
9699  10 Niall, 2017   

 

 [facility]  [id number] [place] [address] 42983-
0259     

 

 [facility]  [address] [place] [address] 22257-
3382  22 Dec, 2016  Attention deficit hyperactivity disorder (ADHD), 
predominantly inattentive type F90.0

 

 [facility]  [address] [place] [id number] [place] [address] 96941-
1628  12 Dec, 2016   

 

 [facility]  [id number] [place] [address] 73060-
4991  07 Dec, 2016   

 

 [facility]  [address] [id number] [place] [address] 53334-
3307  05 Dec, 2016   

 

 [facility]  [id number] [place] [address] 57556-
8245  05 Dec, 2016   

 

 [facility]  [id number] [place] [address] 25617-
4138  05 Dec, 2016  Low TSH level R94.6

 

 [facility]  [id number] [place] [address] 14418-
3436  02 Dec, 2016   

 

 [facility]  [address] [id number] [place] [address] 05385-
0033  10 Nov, 2016   

 

 [facility]  [address] [place] [id number] [place] [address] 72024-
0086  10 Nov, 2016   

 

 [facility]  [id number] [place] [address] 87863-
0032     

 

 [facility]  [address] [place] [id number] [place] [address] 31485-
4589  18 Oct, 2016  Low TSH level R94.6

 

 [facility]  [address] [place] [address] 02864-
7351  17 Oct, 2016  Excessive daytime sleepiness G47.19 and ADHD (attention 
deficit hyperactivity disorder), inattentive type F90.0

 

 [facility]  [address] [place] [address] 96313-
5214  13 Oct, 2016   

 

 [facility]  [address] [place] [id number] [place] [address] 99554-
4654  12 Oct, 2016   

 

 [facility]  [address] [facility] [id number] [place] [address]-
9221  03 Oct, 2016   

 

 [facility]  [address] [facility] [id number] [place] [address], [place] [address]-
2404  28 Sep, 2016   

 

 [facility]  [address] [facility] [id number] [place] [address]-
0595  14 Sep, 2016   

 

 [facility]  [address] [facility] [id number] [place] [address], [place] [address]-
7973  01 Sep, 2016   

 

 [facility]  [address] [facility] [id number] [place] [address], [place] [address]-
7162  17 Aug, 2016   

 

 [facility]  [address] [facility] [id number] [place] [address], [place] [address]-
9133  04 Aug, 2016   

 

 [facility]  [address] [facility] [id number] [place] [address], [place] [address]-
0605  03 Aug, 2016   

 

 [facility]  [address] [place] [id number] [place] [address] 15166-
9598    Lumbar neuritis M54.16

 

 [facility]  [address] [place] [id number] [address]-
4883     

 

 [facility]  [address] [place] [id number] [address]-
1453     

 

 [facility]  [address] [address]-
7887    Lumbar neuritis M54.16

 

 [facility]  [address] [facility] [id number] [place] [address]-
8689     

 

 [facility]  [address] [place] [id number] [address]-
3499     

 

 [facility] [place] [facility]  [address] [facility] [id number] [place] [address]-
2432  26 May, 2016  Lumbar neuritis M54.16

 

 [facility]  [address] [place] [id number] [address]-
7759  25 May, 2016   

 

 [facility]  [address] [place] [address] 28550-
9070  10 May, 2016   

 

 [facility]  [address] [id number] [place] [address] 83800-
5205    Lumbar neuritis M54.16

 

 [facility]  [address] [place] [id number] [place] [address] 47334-
9475     

 

 [facility]  [address] [place] [id number] [place] [address] 94419-
2878     

 

 [facility]  [address] [id number] [place] [address] 66225-
7335  23 Mar, 2016   

 

 [facility]  [id number] [place] [address] 31087-
9214  14 Mar, 2016   

 

 [facility]  [address] [id number] [place] [address] 00351-
9738  14 Mar, 2016   

 

 [facility]  [address] [id number] [place] [address] 59413-
6906  11 Mar, 2016   

 

 [facility]  [id number] [place] [address] 92610-
5558  24 2016   

 

 [facility]  [address] [place] [id number] [address]-
9007    Inguinal hernia, right K40.90

 

 [facility]  [address] [place] [id number] [place] [address] 37624-
7914     

 

 [facility]  [address] [place] [address] 09586-
0718  15 2016  Low back pain M54.5 and Sciatica, unspecified side M54.30

 

 [facility]  [address] [place] [id number] [address]-
9281     

 

 [facility]  [address] [id number] [place] [address] 93784-
0681     

 

 [facility]  [address] [facility] [id number] [place] [address]-
4449  30 Dec, 2015   

 

 [facility]  [address] [place] [id number] [place] [address], [place] [address]-
5695  28 Dec, 2015   

 

 [facility] [place] [facility]  [address] [facility] [id number] [place] [address]-
4749  02 Dec, 2015   

 

 [facility] [place] [facility]  [address] [facility] [id number] [place] [address], [place] [address]-
6094     

 

 [facility] [place] [facility]  [address] [facility] [id number] [place] [address]-
9031     

 

 [facility] [place] [facility]  [address] [id number] [place] [address], [place] [address]-
3790     

 

 [facility] [place] [facility]  [address] [place] [id number] [place] [address], [place] [address]-
2629     

 

 [facility] [place] [facility]  [address] [place] [id number] [place] [address], [place] [address]-
5079  26 Oct, 2015   

 

 [facility]  [address] [place] [id number] [place] [address] 33697-
7198  22 Oct, 2015  Encounter for immunization Z23

 

 [facility]  [address] [place] [address] 60813-
1164  07 Oct, 2015   

 

 [facility]  [address] [address]-
2485  05 Oct, 2015   

 

 [facility] [place] [facility]  [address] [address]-
5979  09 Sep, 2015   

 

 [facility] [place] [facility]  [address] [place] [id number] [address]-
1161  08 Sep, 2015   

 

 [facility] [place] [facility]  [address] [id number] [address]-
3452  19 Aug, 2015  Lumbago 724.2

 

 [facility] [place] [facility]  [address] [place] [address]-
3324  12 Aug, 2015   

 

 [facility] [place] [facility]  [address] [place] [id number] [place] [address]-
3977    Lumbago 724.2

 

 [facility] [place] [facility]  [address] [id number] [place] [address]-
3451  17 2015   

 

 [facility] [place] [facility]  [address] [id number] [place] [address]-
9229     

 

 [facility] [place] [facility]  [address] [id number] [place] [address]-
4791     

 

 [facility] [place] [facility]  [address] [id number] [place] [address]-
5832     

 

 [facility] [place] [facility]  [address] [id number] [place] [address]-
5149     

 

 [facility] [place] [facility]  [address] [facility] [id number] [place] [address]-
3826  22 May, 2015   

 

 [facility] [place] [facility]  [address] [facility] [id number] [place] [address]-
7111     

 

 [facility] [place] [facility]  [address] [facility] [id number] [place] [address]-
1828     

 

 [facility] [place] [facility]  [address] [facility] [id number] [place] [address]-
9548  30 Mar, 2015   

 

 [facility] [place] [facility]  [address] [facility] [id number] [place] [address]-
4682  30 Mar, 2015   

 

 [facility] [place] [facility]  [address] [facility] [id number] [place] [address]-
2546  02 Mar, 2015   

 

 [facility] [place] [facility]  [address] [facility] [id number] [place] [address]-
1156  02 Mar, 2015   

 

 [facility] [place] [facility]  [address] [facility] [id number] [place] [address]-
6406     

 

 [facility] [place] [facility]  [address] [facility] [id number] [place] [address]-
2546     

 

 [facility] [place] [facility]  [address] [facility] [id number] [place] [address]-
0310     

 

 [facility] [place] [facility]  [address] [id number] [place] [address]-
8652     

 

 [facility] [place] [facility]  [address] [id number] [place] [address]-
0814     

 

 [facility] [place] [facility]  [address] [facility] [id number] [place] [address]-
4664     

 

 [facility] [place] [facility]  [address] [id number] [place] [address]-
4105  31 Dec, 2014   

 

 [facility] [place] [facility]  [address] [id number] [place]-
8245  31 Dec, 2014   

 

 [facility] [place] [facility]  [address] [facility] [id number] [place] [address]-
1516  22 Dec, 2014   

 

 [facility] [place] [facility]  [address] [facility] [id number] [place] [address]-
3931  22 Dec, 2014   

 

 [facility] [place] [facility]  [address] [id number] [place] [address]-
3728  08 Dec, 2014   

 

 [facility] [place] [facility]  [address] [facility] [id number] [place] [address]-
5685  08 Dec, 2014   

 

 [facility] [place] [facility]  [address] [facility] [id number] [place] [address]-
8793  10 Nov, 2014   

 

 [facility] [place] [facility]  [address] [facility] [id number] [address]-
9900  10 Nov, 2014   

 

 [facility] [place] [facility]  [address] [id number] [place] [address]-
3729  13 Oct, 2014   

 

 [facility] [place] [facility]  [address] [id number] [place] [address]-
0223  13 Oct, 2014   

 

 [facility] [place] [facility]  [address] [facility] [id number] [address]-
6045  01 Oct, 2014   

 

 [facility] [place] [facility]  [address] [facility] [id number] [place] [address]-
8896  01 Oct, 2014   

 

 [facility] [place] [facility]  [address] [id number] [place] [address]-
1375  15 Sep, 2014   

 

 [facility]  [address] [id number] [place] [address]-
2331  15 Sep, 2014   

 

 [facility] [place] [facility]  [address] [id number] [place] [address]-
0564  18 Aug, 2014   

 

 [facility] [place] [facility]  [address] [id number] [place] [address]-
3760  18 Aug, 2014   

 

 [facility] [place] [facility]  [address] [id number] [place] [address]-
9830  18 Aug, 2014   

 

 [facility] [place] [facility]  [address] [id number] [place] [address]-
5177  18 Aug, 2014   

 

 [facility] [place] [facility]  [address] [id number] [place] [address]-
7618     

 

 [facility] [place] [facility]  [address] [id number] [place] [address]-
3634     

 

 [facility]  [address] [id number] [place] [address]-
8746     

 

 [facility] [place] [facility]  [address] [id number] [place] [address]-
3229     

 

 [facility]  [address] [id number] [place] [address]-
8565     

 

 [facility] [place] [facility]  [address] [id number] [place] [address]-
9894     

 

 [facility] [place] [facility]  [address] [id number] [place] [address]-
4309  30 May, 2014   

 

 [facility] [place] [facility]  [address] [id number] [place] [address]-
6797  28 May, 2014   

 

 [facility] [place] [facility]  [address] [id number] [place] [address]-
4076  28 May, 2014   

 

 [facility] [place] [facility]  [address] [id number] [place] [address]-
5579     

 

 [facility] [place] [facility]  [address] [id number] [place] [address]-
0803  30 2014   

 

 [facility] [place] [facility]  [address] [id number] [place] [address]-
6618     

 

 [facility] [place] [facility]  [address] [id number] [place] [address]-
6210     

 

 [facility] [place] [facility]  [address] [id number] [place] [address]-
4025     

 

 [facility] [place] [facility]  [address] [id number] [place] [address]-
9811     

 

 [facility] [place] [facility]  [address] [id number] [place] [address]-
3484     

 

 [facility] [place] [facility]  [address] [id number] [place] [address]-
4274     

 

 [facility] [place] [facility]  [address] [id number] [place] [address]-
6386     

 

 [facility] [place] [facility]  [address] [id number] [place] [address]-
[address]     

 

 [facility] [place] [facility]  [address] [id number] [place] [address]-
[address]     

 

 [facility] [place] [facility]  [address] [id number] [place] [address]-
[address]  28 Mar, 2014   

 

 [facility] [place] [facility]  [address] [id number] [place] [address]-
[address]  27 Mar, 2014   

 

 [facility] [place] [facility]  [address] [id number] [place] [address]-
[address]  27 Mar, 2014   

 

 [facility] [place] [facility]  [address] [address] [id number] [place] [address]  26 Mar, 2014   

 

 [facility] [place] [facility]  [address] [id number] [place] [address]-
1176  26 Mar, 2014   

 

 [facility] [place] [facility]  [address] [id number] [place] [address]-
[address]     

 

 [facility] [place] [facility]  [address] [id number] [place] [address]-
2416     

 

 [facility] [place] [facility]  [address] [id number] [place] [address]-
9156     

 

 [facility] [place] [facility]  [address] [id number] [place] [address]-
6366     

 

 [facility] [place] [facility]  [address] [id number] [place] [address]-
5910     

 

 [facility] [place] [facility]  [address] [id number] [place] [address]-
2486     

 

 [facility] [place] [facility]  [address] [id number] [place] [address]-
0474     

 

 [facility] [place] [facility]  [address] [id number] [place] [address]-
0700     

 

 [facility] [place] [facility]  [address] [id number] [place] [address]-
1644     

 

 [facility] [place] [facility]  [address] [id number] [place] [address]-
6383     

 

 [facility] [place] [facility]  [address] [id number] [place] [address]-
7393     

 

 [facility] [place] [facility]  [address] [id number] [place] [address]-
4846     

 

 [facility] [place] [facility]  [address] [id number] [place] [address]-
2523     

 

 [facility] [place] [facility]  [address] [id number] [place] [address]-
9624  10 Dec, 2013   

 

 [facility] [place] [facility]  [address] [id number] [place] [address]-
6459  10 Dec, 2013   

 

 [facility] [place] [facility]  [address] [id number] [place] [address]-
2932     

 

 [facility]  [address] [facility] [id number] [place] [address]-
9054     







IMMUNIZATIONS

No Known Immunizations



SOCIAL HISTORY

Never Assessed



REASON FOR VISIT

Medication refill request



PLAN OF CARE





VITAL SIGNS





MEDICATIONS

No Known Medications



RESULTS

No Results



PROCEDURES

No Known procedures



INSTRUCTIONS





MEDICATIONS ADMINISTERED

No Known Medications



MEDICAL (GENERAL) HISTORY







 Type  Description  Date

 

 Medical History  narcolepsy   

 

 Surgical History  Gallbladder removed  2015

 

 Surgical History  Hernia repair  2016

## 2018-09-02 NOTE — XMS REPORT
[facility]

 Created on: 10/30/2015



Sofiya Singh

External Reference #: [id number]

: 1955

Sex: Female



Demographics







 Address  [address] [place]  [address]

 

 Home Phone  [phone]

 

 Preferred Language  Unknown

 

 Marital Status  Unknown

 

 [employer] Affiliation  Unknown

 

 Race  White

 

 Ethnic Group  Not  or 





Author







 Author  SELENE HUDDLESTON

 

 [facility]  [employer]

 

 Address  Unknown

 

 Phone  Unavailable







Care Team Providers







 Care Team Member Name  Role  Phone

 

 SELENE HUDDLESTON  CP  Unavailable



                                                                



Allergies

          No Known Allergies                                                   
                                     



Problems

          





 Problem Type  Condition  Code  Onset Dates  Condition Status

 

 Problem  Screening examination for pulmonary tuberculosis  V74.1     Active

 

 Problem  Pain in joint, pelvic region and thigh  719.45     Active

 

 Problem  Pneumonia, organism unspecified  486     Active

 

 Assessment  Encounter for immunization  Z23     Active

 

 Problem  Family history of ischemic heart disease  V17.3     Active

 

 Problem  Family history of other cardiovascular diseases  V17.49     Active

 

 Problem  Unspecified arthropathy, site unspecified  716.90     Active

 

 Problem  Anxiety state, unspecified  300.00     Active

 

 Problem  Lumbago  724.2     Active

 

 Problem  Family history of malignant neoplasm of breast  V16.3     Active

 

 Problem  Family history of diabetes mellitus  V18.0     Active



                                                                               
                                                                               
                              



Medications

          No Known Medications                                                 
                                       



Procedures

          





 Procedure  Coding System  Code  Date

 

 SINGLE IMMUNIZATION ADMIN  CPT-4  11164  Oct 22, 2015

 

 TDAP (BOOSTRIX)  CPT-4  07574  Oct 22, 2015



                                                                               
         



Results

          No Known Results                                                     
                                   



Immunizations

          





 Vaccine  Administration Date

 

 TDAP (BOOSTRIX)  Oct 22, 2015



                                                                    



Summary Purpose

          eClinicalWorks Submission

## 2018-09-02 NOTE — XMS REPORT
[facility]

 Created on: 2018



Sofiya Singh

External Reference #: [id number]

: 1955

Sex: Female



Demographics







 Address  [address] [place]  [address]

 

 Preferred Language  Unknown

 

 Marital Status  Unknown

 

 Yarsanism Affiliation  Unknown

 

 Race  Unknown

 

 Ethnic Group  Unknown





Author







 Author  NAHUN SNYDER

 

 Organization  [facility]

 

 Address  [address] [place]  [address]



 

 Phone  [phone]







Care Team Providers







 Care Team Member Name  Role  Phone

 

 NAHUN SNYDER  Unavailable  [phone]







PROBLEMS







 Type  Condition  ICD9-CM Code  EGN54-BZ Code  Onset Dates  Condition Status  
SNOMED Code

 

 Problem  ADHD (attention deficit hyperactivity disorder), inattentive type     
F90.0     Active  92782342

 

 Problem  Arthritis     M19.90     Active  4318057

 

 Problem  Positive skin test for tuberculosis     R76.11     Active  292675559

 

 Problem  Lumbago with sciatica, left side     M54.42     Active  480175329

 

 Problem  Excessive daytime sleepiness     G47.19     Active  479572167432

 

 Problem  Primary narcolepsy without cataplexy     G47.419     Active  
[id number]

 

 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 
    Active  [id number]







ALLERGIES

No Information



ENCOUNTERS







 Encounter  Location  Date  Diagnosis

 

 [place] [id number] [place] [address] 46344-
3022     

 

 [place] [id number] [place] [address] 27991-
3636  06 2018  Right lower quadrant abdominal pain R10.31 and Rash R21

 

 [place] [address] [address]-
9268  14 Mar, 2018  ADHD (attention deficit hyperactivity disorder), 
inattentive type F90.0 ; Lumbago with sciatica, left side M54.42 and Arthritis 
M19.90

 

 [place] [address] [address]-
9436    ADHD (attention deficit hyperactivity disorder), 
inattentive type F90.0 and Lumbar neuritis M54.16

 

 [place] [id number] [place] [address] 76495-
0837     

 

 [facility]  [address] [place] [address] 34873-
1877    Lumbar neuritis M54.16

 

 [facility]  [id number] [place] [address] 24433-
6920  16 2018  Low back pain M54.5

 

 [facility]  [id number] [place] [address] 91108-
7974    ADHD (attention deficit hyperactivity disorder), 
inattentive type F90.0

 

 [facility]  [id number] [place] [address] 77898-
9800    Positive skin test for tuberculosis R76.11

 

 [facility]  [id number] [place] [address] 55752-
8671    Positive skin test for tuberculosis R76.11

 

 [facility]  [id number] [place] [address] 97181-
6458     

 

 [facility]  [id number] [place] [address] 57270-
7121    Lumbar neuritis M54.16

 

 [facility]  [id number] [place] [address] 78770-
9645    ADHD (attention deficit hyperactivity disorder), 
inattentive type F90.0

 

 [facility]  [id number] [place] [address] 51194-
6038     

 

 [facility]  [id number] [place] [address] 00827-
2899  20 Dec, 2017  Lumbar neuritis M54.16

 

 [facility]  [id number] [place] [address] 09539-
9346  15 Dec, 2017  ADHD (attention deficit hyperactivity disorder), 
inattentive type F90.0

 

 [facility]  [id number] [place] [address] 80489-
2084  12 Dec, 2017   

 

 [facility]  [id number] [place] [address] 70106-
4857  11 Dec, 2017   

 

 [facility]  [address] [place] [address] 15953-
3879    Lumbar neuritis M54.16

 

 [facility]  [id number] [place] [address] 49194-
2636    ADHD (attention deficit hyperactivity disorder), 
inattentive type F90.0 and Primary narcolepsy without cataplexy G47.419

 

 [facility]  [id number] [place] [address] 22684-
4214  10 Nov, 2017   

 

 [facility]  [address] [place] [address] 27121-
1423     

 

 [facility]  [address] [place] [address] 12437-
7350  23 Oct, 2017  Lumbar neuritis M54.16 and ADHD (attention deficit 
hyperactivity disorder), inattentive type F90.0

 

 [facility]  [address] [place] [address] 37783-
9927  12 Oct, 2017   

 

 [facility]  [id number] [place] [address] 91931-
8148  26 Sep, 2017  Lumbar neuritis M54.16 and ADHD (attention deficit 
hyperactivity disorder), inattentive type F90.0

 

 [facility]  [address] [place] [address] 41199-
0342  19 Sep, 2017   

 

 [facility]  [id number] [place] [address] 49409-
1327  31 Aug, 2017  ADHD (attention deficit hyperactivity disorder), 
inattentive type F90.0 and Lumbar neuritis M54.16

 

 [facility]  [id number] [place] [address] 14772-
1484  24 Aug, 2017  Lumbar neuritis M54.16

 

 [facility]  [address] [place] [address] 35187-
5438  23 Aug, 2017   

 

 [facility]  [id number] [place] [address] 44626-
8861  02 Aug, 2017  ADHD (attention deficit hyperactivity disorder), 
inattentive type F90.0 and Lumbar neuritis M54.16

 

 [facility]  [address] [address]-
5781  02 Aug, 2017   

 

 [facility]  [id number] [address]-
1467     

 

 [facility]  [address] [place] [address] 30898-
2926     

 

 [facility]  [id number] [place] [address] 60124-
2623     

 

 [facility]  [address] [place] [address] 79229-
1940     

 

 [facility]  [address] [place] [address] 97054-
4758    ADHD (attention deficit hyperactivity disorder), 
inattentive type F90.0 and Lumbar neuritis M54.16

 

 [facility]  [address] [address]-
3644     

 

 [facility]  [address] [place] [address] 05467-
2263     

 

 [facility]  [address] [place] [address] 13502-
6586    Lumbar neuritis M54.16 and ADHD (attention deficit 
hyperactivity disorder), inattentive type F90.0

 

 [facility]  [address] [address]-
1554     

 

 [facility]  [id number] [place] [address] 40634-
9642  10 May, 2017  Lumbar neuritis M54.16 and ADHD (attention deficit 
hyperactivity disorder), inattentive type F90.0

 

 [facility]  [address] [address]-
7440  03 May, 2017   

 

 [facility]  [address] [place] [address] 96208-
5728  01 May, 2017   

 

 [facility]  [address] [place] [address] 14167-
7261    Narcolepsy due to underlying condition without cataplexy 
G47.429 and Lumbago with sciatica, left side M54.42

 

 [facility]  [address] [place] [address] 25165-
3621    Lumbar neuritis M54.16 and ADHD (attention deficit 
hyperactivity disorder), inattentive type F90.0

 

 [facility]  [address] [place] [address] 79808-
2531  31 Mar, 2017   

 

 [facility]  [address] [place] [address] 42031-
8065  15 Mar, 2017  Lumbar neuritis M54.16 and ADHD (attention deficit 
hyperactivity disorder), inattentive type F90.0

 

 [facility]  [address] [place] [address] 69250-
8395  15 Mar, 2017   

 

 [facility]  [address] [place] [address] 37065-
8772  06 Mar, 2017   

 

 [facility]  [address] [place] [address] 75864-
1430  15 Feb, 2017  ADHD (attention deficit hyperactivity disorder), 
inattentive type F90.0

 

 [facility]  [address] [place] [address] 43037-
4908  15 Feb, 2017  Lumbar neuritis M54.16

 

 [facility]  [address] [place] [address] 21385-
3154     

 

 [facility]  [address] [place] [address] 52945-
3079     

 

 [facility]  [address] [place] [address] 50563-
3207    Attention deficit hyperactivity disorder (ADHD), 
predominantly inattentive type F90.0

 

 [facility]  [id number] [address]-
1046     

 

 [facility]  [address] [place] [address] 14760-
8524  10 Niall, 2017   

 

 [facility]  [address] [place] [address] 95811-
9529     

 

 [facility]  [address] [place] [address] 88588-
1967  22 Dec, 2016  Attention deficit hyperactivity disorder (ADHD), 
predominantly inattentive type F90.0

 

 [facility]  [address] [place] [address] 71709-
3934  12 Dec, 2016   

 

 [facility]  [address] [place] [address] 31897-
3598  07 Dec, 2016   

 

 [facility]  [address] [place] [address] 93937-
1848  05 Dec, 2016   

 

 [facility]  [address] [place] [address] 68446-
4719  05 Dec, 2016  Low TSH level R94.6

 

 [facility]  [address] [place] [address] 21598-
2783  05 Dec, 2016   

 

 [facility]  [address] [place] [address] 93229-
6701  02 Dec, 2016   

 

 [facility]  [address] [place] [address] 80346-
4297  10 Nov, 2016   

 

 [facility]  [address] [place] [address] 59977-
0011  10 Nov, 2016   

 

 [facility]  [address] [place] [address] 29400-
7152     

 

 [facility]  [address] [place] [address] 72136-
9874  18 Oct, 2016  Low TSH level R94.6

 

 [facility]  [address] [place] [address] 78346-
0514  17 Oct, 2016  Excessive daytime sleepiness G47.19 and ADHD (attention 
deficit hyperactivity disorder), inattentive type F90.0

 

 [facility]  [address] [place] [address] 44940-
8645  13 Oct, 2016   

 

 [facility]  [address] [place] [address] 25738-
7705  12 Oct, 2016   

 

 [facility]  [address] [place] [address] 16090-
1829  03 Oct, 2016   

 

 [facility]  [address] [place] [address] 23002-
8559  28 Sep, 2016   

 

 [facility]  [address] [place] [address] 88280-
8456  14 Sep, 2016   

 

 [facility]  [address] [place] [address] 30244-
0556  01 Sep, 2016   

 

 [facility]  [address] [place] [address] 87482-
8153  17 Aug, 2016   

 

 [facility]  [address] [place] [address] 41156-
7736  04 Aug, 2016   

 

 [facility]  [address] [place] [address] 05638-
1956  03 Aug, 2016   

 

 [facility]  [address] [place] [address] 14449-
6006    Lumbar neuritis M54.16

 

 [facility]  [address] [place] [address] 17355-
1217     

 

 [facility]  [address] [place] [address] 11835-
4064     

 

 [facility]  [address] [place] [address] 75551-
7493    Lumbar neuritis M54.16

 

 [facility]  [address] [place] [address] 18836-
6463     

 

 [facility]  [address] [place] [address] 87592-
6507     

 

 [facility]  [address] [address]-
6794  26 May, 2016  Lumbar neuritis M54.16

 

 [facility]  [address] [address]-
3232  25 May, 2016   

 

 [facility]  [address] [place] [address] 85834-
9784  10 May, 2016   

 

 [facility]  [address] [place] [address] 83356-
5922    Lumbar neuritis M54.16

 

 [facility]  [address] [place] [address] 03856-
1385     

 

 [facility]  [address] [place] [address] 83890-
6774     

 

 [facility]  [address] [place] [address] 21169-
9040  23 Mar, 2016   

 

 [facility]  [address] [address]-
9333  14 Mar, 2016   

 

 [facility]  [address] [address]-
1650  14 Mar, 2016   

 

 [facility]  [address] [address]-
4083  11 Mar, 2016   

 

 [facility]  [address] [address]-
0711  24 2016   

 

 [facility]  [address] [address]-
5247    Inguinal hernia, right K40.90

 

 [facility]  [address] [address]-
2580  17 2016   

 

 [facility]  [address] [address]-
1862  15 2016  Low back pain M54.5 and Sciatica, unspecified side M54.30

 

 [facility]  [address] [facility] [id number] [address]-
0516     

 

 [facility]  [address] [facility] [id number] [address]-
0146     

 

 [facility]  [address] [facility] [id number] [address]-
0207  30 Dec, 2015   

 

 [facility]  [address] [facility] [id number] [place] [address] 44340-
3462  28 Dec, 2015   

 

 [facility]  [address] [facility] [id number] [place] [address]-
3618  02 Dec, 2015   

 

 [facility]  [address] [place] [address] 60790-
1334     

 

 [facility]  [address] [address]-
2477     

 

 [facility]  [address] [place] [address] 12176-
2550     

 

 [facility]  [id number] [address]-
3627     

 

 [facility]  [address] [address]-
9295  26 Oct, 2015   

 

 [facility]  [address] [address]-
9796  22 Oct, 2015  Encounter for immunization Z23

 

 [facility]  [address] [facility] [id number][place] [address]-
1755  07 Oct, 2015   

 

 [facility]  [address] [facility] [id number] [address]-
3106  05 Oct, 2015   

 

 [facility]  [id number] [address]-
9211  09 Sep, 2015   

 

 [facility]  [id number] [address]-
4882  08 Sep, 2015   

 

 [facility]  [address] [address]-
7474  19 Aug, 2015  Lumbago 724.2

 

 [facility] [place] [facility]  [address] [id number] [place]
3840  12 Aug, 2015   

 

 [facility] [place] [facility]  [address] [id number] [place] [address]-
0938    Lumbago 724.2

 

 [facility] [place] [facility]  [address] [id number] [place] [address]-
9457     

 

 [facility] [place] [facility]  [address] [id number] [place] [address]-
4272     

 

 [facility] [place] [facility]  [address] [id number] [place] [address]-
8903     

 

 [facility] [place] [facility]  [address] [id number] [place] [address]-
5358     

 

 [facility] [place] [facility]  [address] [id number] [place] [address]-
5772     

 

 [facility] [place] [facility]  [address] [id number] [place] [address]-
0852  22 May, 2015   

 

 [facility] [place] [facility]  [address] [id number] [place] [address]-
8757     

 

 [facility] [place] [facility]  [address] [id number] [place] [address]-
7813     

 

 [facility] [place] [facility]  [address] [id number] [place] [address]-
5530  30 Mar, 2015   

 

 [facility] [place] [facility]  [address] [id number] [place] [address]-
3198  30 Mar, 2015   

 

 [facility] [place] [facility]  [address] [id number] [place] [address]-
3549  02 Mar, 2015   

 

 [facility] [place] [facility]  [address] [id number] [place] [address]-
3538  02 Mar, 2015   

 

 [facility] [place] [facility]  [address] [id number] [place] [address]-
4299  ,    

 

 [facility] [place] [facility]  [address] [id number] [place] [address]-
6780  ,    

 

 [facility] [place] [facility]  [address] [id number] [place] [address]-
5676     

 

 [facility] [place] [facility]  [address] [id number] [place] [address]-
0266     

 

 [facility] [place] [facility]  [address] [id number] [place] [address]-
6993     

 

 [facility] [place] [facility]  [address] [id number] [place] [address]-
8383     

 

 [facility] [place] [facility]  [address] [id number] [place] [address]-
2423  31 Dec, 2014   

 

 [facility] [place] [facility]  [address] [id number] [place] [address]-
6991  31 Dec, 2014   

 

 [facility] [place] [facility]  [address] [id number] [place] [address]-
0305  22 Dec, 2014   

 

 [facility] [place] [facility]  [address] [id number] [place] [address]-
0941  22 Dec, 2014   

 

 [facility] [place] [facility]  [address] [facility] [id number] [place] [address]-
9266  08 Dec, 2014   

 

 [facility] [place] [facility]  [address] [id number] [place] [address]-
1572  08 Dec, 2014   

 

 [facility] [place] [facility]  [address] [id number] [place] [address]-
5016  10 Nov, 2014   

 

 [facility] [place] [facility]  [address] [id number] [place]
9860  10 Nov, 2014   

 

 [facility] [place] [facility]  [address] [id number] [place]
1466  13 Oct, 2014   

 

 [facility] [place] [facility]  [address] [id number] [place]
9246  13 Oct, 2014   

 

 [facility] [place] [facility]  [address] [id number] [place] [address]-
7326  01 Oct, 2014   

 

 [facility] [place] [facility]  [address] [id number] [place]
2693  01 Oct, 2014   

 

 [facility] [place] [facility]  [address] [id number] [place] [address]-
2551  15 Sep, 2014   

 

 [facility] [place] [facility]  [address] [id number] [place] [address]-
7834  15 Sep, 2014   

 

 [facility] [place] [facility]  [address] [id number] [place] [address]-
0856  18 Aug, 2014   

 

 [facility] [place] [facility]  [address] [id number] [place] [address]-
5933  18 Aug, 2014   

 

 [facility] [place] [facility]  [address] [id number] [place] [address]-
8668  18 Aug, 2014   

 

 [facility] [place] [facility]  [address] [id number] [place] [address]-
8202  18 Aug, 2014   

 

 [facility] [place] [facility]  [address] [id number] [place] [address]-
1429     

 

 [facility] [place] [facility]  [address] [id number] [place] [address]-
2818     

 

 [facility] [place] [facility]  [address] [id number] [place] [address]-
9661     

 

 [facility] [place] [facility]  [address] [id number] [place] [address]-
2849     

 

 [facility] [place] [facility]  [address] [id number] [place] [address]-
7860     

 

 [facility] [place] [facility]  [address] [id number] [place] [address]-
5611     

 

 [facility] [place] [facility]  [address] [id number] [place] [address]-
8054  30 May, 2014   

 

 [facility] [place] [facility]  [address] [id number] [place] [address]-
3901  28 May, 2014   

 

 [facility] [place] [facility]  [address] [id number] [place] [address]-
0629  28 May, 2014   

 

 [facility] [place] [facility]  [address] [id number] [place] [address]-
2200     

 

 [facility] [place] [facility]  [address] [id number] [place] [address]-
7152     

 

 [facility] [place] [facility]  [address] [id number] [place] [address]-
8509     

 

 [facility] [place] [facility]  [address] [id number] [place] [address]-
5615     

 

 [facility] [place] [facility]  [address] [id number] [place] [address]-
0041     

 

 [facility] [place] [facility]  [address] [id number] [place] [address]-
8968     

 

 [facility] [place] [facility]  [address] [id number] [place] [address]-
6090     

 

 [facility] [place] [facility]  [address] [id number] [place] [address]-
6045     

 

 [facility] [place] [facility]  [address] [id number] [place] [address]-
8322     

 

 [facility] [place] [facility]  [address] [id number] [place] [address]-
9211     

 

 [facility] [place] [facility]  [address] [id number] [place] [address]-
0615     

 

 [facility] [place] [facility]  [address] [id number] [place] [address]-
2297  28 Mar, 2014   

 

 [facility] [place] [facility]  [address] [id number] [place] [address]-
8546  27 Mar, 2014   

 

 [facility] [place] [facility]  [address] [id number] [place] [address]-
0559  27 Mar, 2014   

 

 [facility] [place] [facility]  [address] [id number] [place] [address]-
3150  26 Mar, 2014   

 

 [facility] [place] [facility]  [address] [id number] [place] [address]-
2494  26 Mar, 2014   

 

 [facility] [place] [facility]  [address] [id number] [place] [address]-
0620     

 

 [facility] [place] [facility]  [address] [id number] [place] [address]-
8409     

 

 [facility] [place] [facility]  [address] [id number] [place] [address]-
3566     

 

 [facility] [place] [facility]  [address] [id number] [place] [address]-
3689     

 

 [facility] [place] [facility]  [address] [id number] [place] [address]-
0869     

 

 [facility] [place] [facility]  [address] [id number] [place] [address]-
3744     

 

 [facility] [place] [facility]  [address] [id number] [place] [address]-
5052     

 

 [facility] [place] [facility]  [address] [id number] [place] [address]-
2507     

 

 [facility] [place] [facility]  [address] [id number] [place] [address]-
8411     

 

 [facility] [place] [facility]  [address] [id number] [place] [address]-
3608     

 

 [facility] [place] [facility]  [address] [id number] [place] [address]-
4270     

 

 [facility] [place] [facility]  [address] [id number] [place] [address]-
2672     

 

 [facility] [place] [facility]  [address] [id number] [place] [address]-
9918     

 

 [facility] [place] [facility]  [address] [id number] [place] [address]-
9816  10 Dec, 2013   

 

 [facility]  [address] [facility] [id number] [place] [address]-
7546  10 Dec, 2013   

 

 [facility]  [address] [facility] [id number] [address]-
1666     

 

 [facility]  [address] [facility] [id number] [address]-
3876     







IMMUNIZATIONS

No Known Immunizations



SOCIAL HISTORY

Never Assessed



REASON FOR VISIT

Xray (walk-in) MHill RT(R)



PLAN OF CARE





VITAL SIGNS





MEDICATIONS

Unknown Medications



RESULTS







 Name  Result  Date  Reference Range

 

 Xray : Chest 2 View (IN HOUSE)     2018   







PROCEDURES







 Procedure  Date Ordered  Result  Body Site

 

 X-RAY EXAM CHEST 2 VIEWS  2018      







INSTRUCTIONS





MEDICATIONS ADMINISTERED

No Known Medications



MEDICAL (GENERAL) HISTORY







 Type  Description  Date

 

 Medical History  narcolepsy   

 

 Surgical History  Gallbladder removed  2015

 

 Surgical History  Hernia repair  2016

## 2018-09-02 NOTE — XMS REPORT
[facility]

 Created on: 2018



Sofiya Singh

External Reference #: [id number]

: 1955

Sex: Female



Demographics







 Address  [address] [place]  [address]

 

 Preferred Language  Unknown

 

 Marital Status  Unknown

 

 Restoration Affiliation  Unknown

 

 Race  Unknown

 

 Ethnic Group  Unknown





Author







 Author  NAHUN SNYDER

 

 Organization  [facility]

 

 Address  [address] [place]  [address]



 

 Phone  [phone]







Care Team Providers







 Care Team Member Name  Role  Phone

 

 NAHUN SNYDER  Unavailable  [phone]







PROBLEMS







 Type  Condition  ICD9-CM Code  SZI48-DD Code  Onset Dates  Condition Status  
SNOMED Code

 

 Problem  ADHD (attention deficit hyperactivity disorder), inattentive type     
F90.0     Active  58530736

 

 Problem  Arthritis     M19.90     Active  7211856

 

 Problem  Positive skin test for tuberculosis     R76.11     Active  521334469

 

 Problem  Lumbago with sciatica, left side     M54.42     Active  642096036

 

 Problem  Excessive daytime sleepiness     G47.19     Active  432939449272

 

 Problem  Primary narcolepsy without cataplexy     G47.419     Active  
29339738334020

 

 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 
    Active  [id number]







ALLERGIES

No Information



ENCOUNTERS







 Encounter  Location  Date  Diagnosis

 

 [place] [id number] [place] [address] 91087-
1876     

 

 [place] [id number] [place] [address] 48018-
0866  06 2018  Right lower quadrant abdominal pain R10.31 and Rash R21

 

 [place] [address] [address]-
6981  14 Mar, 2018  ADHD (attention deficit hyperactivity disorder), 
inattentive type F90.0 ; Lumbago with sciatica, left side M54.42 and Arthritis 
M19.90

 

 [place] [address] [address]-
7014    ADHD (attention deficit hyperactivity disorder), 
inattentive type F90.0 and Lumbar neuritis M54.16

 

 [place] [id number] [place] [address] 26577-
6607     

 

 [facility]  [address] [place] [address] 81378-
7187    Lumbar neuritis M54.16

 

 [facility]  [id number] [place] [address] 56989-
1156  16 2018  Low back pain M54.5

 

 [facility]  [id number] [place] [address] 53760-
0473    ADHD (attention deficit hyperactivity disorder), 
inattentive type F90.0

 

 [facility]  [id number] [place] [address] 61291-
2479    Positive skin test for tuberculosis R76.11

 

 [facility]  [id number] [place] [address] 79911-
6945    Positive skin test for tuberculosis R76.11

 

 [facility]  [id number] [place] [address] 41842-
9996     

 

 [facility]  [id number] [place] [address] 94248-
9271    Lumbar neuritis M54.16

 

 [facility]  [id number] [place] [address] 87419-
3937    ADHD (attention deficit hyperactivity disorder), 
inattentive type F90.0

 

 [facility]  [id number] [place] [address] 12246-
8408     

 

 [facility]  [id number] [place] [address] 09713-
3688  20 Dec, 2017  Lumbar neuritis M54.16

 

 [facility]  [id number] [place] [address] 20312-
9905  15 Dec, 2017  ADHD (attention deficit hyperactivity disorder), 
inattentive type F90.0

 

 [facility]  [id number] [place] [address] 70119-
3478  12 Dec, 2017   

 

 [facility]  [id number] [place] [address] 16488-
7168  11 Dec, 2017   

 

 [facility]  [address] [place] [address] 04142-
0837    Lumbar neuritis M54.16

 

 [facility]  [id number] [place] [address] 69170-
5020    ADHD (attention deficit hyperactivity disorder), 
inattentive type F90.0 and Primary narcolepsy without cataplexy G47.419

 

 [facility]  [id number] [place] [address] 36018-
8373  10 Nov, 2017   

 

 [facility]  [id number] [place] [address] 07624-
9504     

 

 [facility]  [id number] [place] [address] 42122-
7085  23 Oct, 2017  Lumbar neuritis M54.16 and ADHD (attention deficit 
hyperactivity disorder), inattentive type F90.0

 

 [facility]  [id number] [place] [address] 98624-
4559  12 Oct, 2017   

 

 [facility]  [id number] [place] [address] 31344-
3233  26 Sep, 2017  Lumbar neuritis M54.16 and ADHD (attention deficit 
hyperactivity disorder), inattentive type F90.0

 

 [facility]  [id number] [place] [address] 77016-
0806  19 Sep, 2017   

 

 [facility]  [id number] [place] [address] 24933-
0569  31 Aug, 2017  ADHD (attention deficit hyperactivity disorder), 
inattentive type F90.0 and Lumbar neuritis M54.16

 

 [facility]  [id number] [place] [address] 85979-
9135  24 Aug, 2017  Lumbar neuritis M54.16

 

 [facility]  [id number] [place] [address] 12649-
1947  23 Aug, 2017   

 

 [facility]  [id number] [place] [address] 89907-
6911  02 Aug, 2017  ADHD (attention deficit hyperactivity disorder), 
inattentive type F90.0 and Lumbar neuritis M54.16

 

 [facility]  [address] [address]-
4058  02 Aug, 2017   

 

 [facility]  [id number] [address]-
6221     

 

 [facility]  [id number] [place] [address] 60404-
3850     

 

 [facility]  [id number] [place] [address] 01142-
4192     

 

 [facility]  [id number] [place] [address] 42986-
9740     

 

 [facility]  [id number] [place] [address] 83010-
2198    ADHD (attention deficit hyperactivity disorder), 
inattentive type F90.0 and Lumbar neuritis M54.16

 

 [facility]  [address] [address]-
2474     

 

 [facility]  [id number] [place] [address] 49372-
9547     

 

 [facility]  [address] [place] [address] 71822-
9906    Lumbar neuritis M54.16 and ADHD (attention deficit 
hyperactivity disorder), inattentive type F90.0

 

 [facility]  [address] [address]-
6415     

 

 [facility]  [id number] [place] [address] 58683-
4677  10 May, 2017  Lumbar neuritis M54.16 and ADHD (attention deficit 
hyperactivity disorder), inattentive type F90.0

 

 [facility]  [address] [address]-
0287  03 May, 2017   

 

 [facility]  [id number] [place] [address] 39859-
6783  01 May, 2017   

 

 [facility]  [id number] [place] [address] 13020-
2715    Narcolepsy due to underlying condition without cataplexy 
G47.429 and Lumbago with sciatica, left side M54.42

 

 [facility]  [id number] [place] [address] 73643-
0523    Lumbar neuritis M54.16 and ADHD (attention deficit 
hyperactivity disorder), inattentive type F90.0

 

 [facility]  [id number] [place] [address] 09041-
1551  31 Mar, 2017   

 

 [facility]  [id number] [place] [address] 67215-
1627  15 Mar, 2017  Lumbar neuritis M54.16 and ADHD (attention deficit 
hyperactivity disorder), inattentive type F90.0

 

 [facility]  [id number] [place] [address] 30501-
5291  15 Mar, 2017   

 

 [facility]  [id number] [place] [address] 23682-
5737  06 Mar, 2017   

 

 [facility]  [id number] [place] [address] 40693-
9383  15 Feb, 2017  ADHD (attention deficit hyperactivity disorder), 
inattentive type F90.0

 

 [facility]  [id number] [place] [address] 30365-
0584  15 Feb, 2017  Lumbar neuritis M54.16

 

 [facility]  [id number] [place] [address] 83368-
2185     

 

 [facility]  [id number] [place] [address] 87646-
0444     

 

 [facility]  [id number] [place] [address] 73476-
9534    Attention deficit hyperactivity disorder (ADHD), 
predominantly inattentive type F90.0

 

 [facility]  [id number] [address]-
4302     

 

 [facility]  [id number] [place] [address] 14641-
1198  10 Niall, 2017   

 

 [facility]  [id number] [place] [address] 12538-
5006     

 

 [facility]  [id number] [place] [address] 50205-
2839  22 Dec, 2016  Attention deficit hyperactivity disorder (ADHD), 
predominantly inattentive type F90.0

 

 [facility]  [id number] [place] [address] 70732-
5737  12 Dec, 2016   

 

 [facility]  [id number] [place] [address] 44472-
2019  07 Dec, 2016   

 

 [facility]  [id number] [place] [address] 98233-
4925  05 Dec, 2016   

 

 [facility]  [id number] [place] [address] 47676-
3293  05 Dec, 2016  Low TSH level R94.6

 

 [facility]  [id number] [place] [address] 89474-
9329  05 Dec, 2016   

 

 [facility]  [id number] [place] [address] 77418-
3453  02 Dec, 2016   

 

 [facility]  [address] [place] [address] 87487-
3481  10 Nov, 2016   

 

 [facility]  [id number] [place] [address] 29126-
2596  10 Nov, 2016   

 

 [facility]  [address] [place] [address] 28431-
1968     

 

 [facility]  [id number] [place] [address] 05579-
5878  18 Oct, 2016  Low TSH level R94.6

 

 [facility]  [address] [place] [address] 00359-
0104  17 Oct, 2016  Excessive daytime sleepiness G47.19 and ADHD (attention 
deficit hyperactivity disorder), inattentive type F90.0

 

 [facility]  [id number] [place] [address] 95765-
1529  13 Oct, 2016   

 

 [facility]  [id number] [place] [address] 67350-
8653  12 Oct, 2016   

 

 [facility]  [id number] [place] [address] 43578-
5490  03 Oct, 2016   

 

 [facility]  [id number] [place] [address] 17966-
0957  28 Sep, 2016   

 

 [facility]  [id number] [place] [address] 75648-
9521  14 Sep, 2016   

 

 [facility]  [id number] [place] [address] 55962-
0925  01 Sep, 2016   

 

 [facility]  [id number] [place] [address] 46137-
4047  17 Aug, 2016   

 

 [facility]  [id number] [place] [address] 69188-
4102  04 Aug, 2016   

 

 [facility]  [id number] [place] [address] 69160-
6191  03 Aug, 2016   

 

 [facility]  [address] [place] [address] 54135-
2704    Lumbar neuritis M54.16

 

 [facility]  [id number] [place] [address] 00022-
8146     

 

 [facility]  [id number] [place] [address] 48778-
0222     

 

 [facility]  [id number] [place] [address] 17527-
6498    Lumbar neuritis M54.16

 

 [facility]  [id number] [place] [address] 76549-
7294     

 

 [facility]  [id number] [place] [address] 55506-
5312     

 

 [facility]  [address] [address]-
2838  26 May, 2016  Lumbar neuritis M54.16

 

 [facility]  [address]100[place] [address]-
3406  25 May, 2016   

 

 [facility]  [id number] [place] [address] 17512-
4555  10 May, 2016   

 

 [facility]  [address] [place] [address] 93756-
7798    Lumbar neuritis M54.16

 

 [facility]  [id number] [place] [address] 35970-
0980     

 

 [facility]  [address] [place] [address] 40283-
2509     

 

 [facility]  [address] [place] [address] 70213-
4682  23 Mar, 2016   

 

 [facility]  [address] [address]-
2608  14 Mar, 2016   

 

 [facility]  [address]100[place] [address]-
6181  14 Mar, 2016   

 

 [facility]  [address] [address]-
2872  11 Mar, 2016   

 

 [facility]  [address]100[place] [address]-
9657  24 2016   

 

 [facility]  [address]100[place] [address]-
4376    Inguinal hernia, right K40.90

 

 [facility]  [address] [address]-
9880  17 2016   

 

 [facility]  [address] [address]-
2954  15 2016  Low back pain M54.5 and Sciatica, unspecified side M54.30

 

 [facility]  [address] [facility] [id number] [address]-
1557     

 

 [facility]  [address] [facility] [id number] [place] [address]-
6309     

 

 [facility]  [address] [facility] [id number] [place] [address]-
9768  30 Dec, 2015   

 

 [facility]  [address] [facility] [id number] [place] [address] 34204-
2119  28 Dec, 2015   

 

 [facility]  [address] [facility] [id number] [place] [address]-
9828  02 Dec, 2015   

 

 [facility]  [id number] [place] [address] 72538-
9116     

 

 [facility]  [address] [address]-
5060     

 

 [facility]  [address] [place] [address] 56994-
7533     

 

 [facility]  [id number] [address]-
3865     

 

 [facility]  [address] [address]-
7567  26 Oct, 2015   

 

 [facility]  [address] [address]-
4972  22 Oct, 2015  Encounter for immunization Z23

 

 [facility]  [address] [facility] [id number] [place] [address]-
3317  07 Oct, 2015   

 

 [facility]  [address] [facility] [id number] [place] [address]-
7948  05 Oct, 2015   

 

 [facility]  [id number] [address]-
8370  09 Sep, 2015   

 

 [facility]  [id number] [address]-
5355  08 Sep, 2015   

 

 [facility]  [address] [place] [address]-
2884  19 Aug, 2015  Lumbago 724.2

 

 [facility] [place] [facility]  [address] [id number] [place]
7974  12 Aug, 2015   

 

 [facility] [place] [facility]  [address] [id number] [place] [address]-
1961    Lumbago 724.2

 

 [facility] [place] [facility]  [address] [id number] [place] [address]-
5000     

 

 [facility] [place] [facility]  [address] [id number] [place] [address]-
2350     

 

 [facility] [place] [facility]  [address] [id number] [place] [address]-
8703     

 

 [facility] [place] [facility]  [address] [id number] [place] [address]-
6319     

 

 [facility] [place] [facility]  [address] [id number] [place] [address]-
9673     

 

 [facility] [place] [facility]  [address] [id number] [place] [address]-
2288  22 May, 2015   

 

 [facility] [place] [facility]  [address] [id number] [place] [address]-
8224     

 

 [facility] [place] [facility]  [address] [id number] [place] [address]-
1092     

 

 [facility] [place] [facility]  [address] [id number] [place] [address]-
0468  30 Mar, 2015   

 

 [facility] [place] [facility]  [address] [id number] [place] [address]-
5109  30 Mar, 2015   

 

 [facility] [place] [facility]  [address] [id number] [place] [address]-
9983  02 Mar, 2015   

 

 [facility] [place] [facility]  [address] [id number] [place] [address]-
2716  02 Mar, 2015   

 

 [facility] [place] [facility]  [address] [id number] [place] [address]-
0944  ,    

 

 [facility] [place] [facility]  [address] [id number] [place] [address]-
5864  ,    

 

 [facility] [place] [facility]  [address] [id number] [place] [address]-
8976     

 

 [facility] [place] [facility]  [address] [id number] [place] [address]-
4076     

 

 [facility] [place] [facility]  [address] [id number] [place] [address]-
0043     

 

 [facility] [place] [facility]  [address] [id number] [place] [address]-
7148     

 

 [facility] [place] [facility]  [address] [id number] [place] [address]-
1176  31 Dec, 2014   

 

 [facility] [place] [facility]  [address] [id number] [place] [address]-
0162  31 Dec, 2014   

 

 [facility] [place] [facility]  [address] [id number] [place] [address]-
7105  22 Dec, 2014   

 

 [facility] [place] [facility]  [address] [id number] [place] [address]-
1231  22 Dec, 2014   

 

 [facility] [place] [facility]  [address] [facility] [id number] [place] [address]-
1772  08 Dec, 2014   

 

 [facility] [place] [facility]  [address] [id number] [place] [address]-
6033  08 Dec, 2014   

 

 [facility] [place] [facility]  [address] [id number] [place] [address]-
7664  10 Nov, 2014   

 

 [facility] [place] [facility]  [address] [id number] [place]-
0965  10 Nov, 2014   

 

 [facility] [place] [facility]  [address] [id number] [place]
3063  13 Oct, 2014   

 

 [facility] [place] [facility]  [address] [id number] [place]
2781  13 Oct, 2014   

 

 [facility] [place] [facility]  [address] [id number] [place] [address]-
8711  01 Oct, 2014   

 

 [facility] [place] [facility]  [address] [id number] [place]-
5407  01 Oct, 2014   

 

 [facility] [place] [facility]  [address] [id number] [place] [address]-
9393  15 Sep, 2014   

 

 [facility] [place] [facility]  [address] [id number] [place] [address]-
4265  15 Sep, 2014   

 

 [facility] [place] [facility]  [address] [id number] [place] [address]-
0341  18 Aug, 2014   

 

 [facility] [place] [facility]  [address] [id number] [place] [address]-
2969  18 Aug, 2014   

 

 [facility] [place] [facility]  [address] [id number] [place] [address]-
1866  18 Aug, 2014   

 

 [facility] [place] [facility]  [address] [id number] [place] [address]-
9980  18 Aug, 2014   

 

 [facility] [place] [facility]  [address] [id number] [place] [address]-
0197     

 

 [facility] [place] [facility]  [address] [id number] [place] [address]-
7088     

 

 [facility] [place] [facility]  [address] [id number] [place] [address]-
3757     

 

 [facility] [place] [facility]  [address] [id number] [place] [address]-
6792     

 

 [facility] [place] [facility]  [address] [id number] [place] [address]-
7968     

 

 [facility] [place] [facility]  [address] [id number] [place] [address]-
4413     

 

 [facility] [place] [facility]  [address] [id number] [place] [address]-
3928  30 May, 2014   

 

 [facility] [place] [facility]  [address] [id number] [place] [address]-
0927  28 May, 2014   

 

 [facility] [place] [facility]  [address] [id number] [place] [address]-
7506  28 May, 2014   

 

 [facility] [place] [facility]  [address] [id number] [place] [address]-
1250     

 

 [facility] [place] [facility]  [address] [id number] [place] [address]-
4596     

 

 [facility] [place] [facility]  [address] [id number] [place] [address]-
0349     

 

 [facility] [place] [facility]  [address] [id number] [place] [address]-
7104     

 

 [facility] [place] [facility]  [address] [id number] [place] [address]-
3661     

 

 [facility] [place] [facility]  [address] [id number] [place] [address]-
3372     

 

 [facility] [place] [facility]  [address] [id number] [place] [address]-
6017     

 

 [facility] [place] [facility]  [address] [id number] [place] [address]-
5635     

 

 [facility] [place] [facility]  [address] [id number] [place] [address]-
9335     

 

 [facility] [place] [facility]  [address] [id number] [place] [address]-
1582     

 

 [facility] [place] [facility]  [address] [id number] [place] [address]-
2816     

 

 [facility] [place] [facility]  [address] [id number] [place] [address]-
9041  28 Mar, 2014   

 

 [facility] [place] [facility]  [address] [id number] [place] [address]-
1889  27 Mar, 2014   

 

 [facility] [place] [facility]  [address] [id number] [place] [address]-
6838  27 Mar, 2014   

 

 [facility] [place] [facility]  [address] [id number] [place] [address]-
2733  26 Mar, 2014   

 

 [facility] [place] [facility]  [address] [id number] [place] [address]-
1853  26 Mar, 2014   

 

 [facility] [place] [facility]  [address] [id number] [place] [address]-
[address]     

 

 [facility] [place] [facility]  [address] [id number] [place] [address]-
[address]     

 

 [facility] [place] [facility]  [address] [id number] [place] [address]-
[address]     

 

 [facility] [place] [facility]  [address] [id number] [place] [address]-
[address]24     

 

 [facility] [place] [facility]  [address] [id number] [place] [address]-
2078     

 

 [facility] [place] [facility]  [address] [id number] [place] [address]-
2788     

 

 [facility] [place] [facility]  [address] [id number] [place] [address]-
9872     

 

 [facility] [place] [facility]  [address] [id number] [place] [address]-
[address]     

 

 [facility] [place] [facility]  [address] [id number] [place] [address]-
[address]     

 

 [facility] [place] [facility]  [address] [id number] [place] [address]-
7136     

 

 [facility] [place] [facility]  [address] [id number] [place] [address]-
[address]     

 

 [facility] [place] [facility]  [address] [id number] [place] [address]-
6695     

 

 [facility] [place] [facility]  [address] [id number] [place] [address]-
35[address]     

 

 [facility] [place] [facility]  [address] [id number] [place] [address]-
2246  10 Dec, 2013   

 

 [facility]  [address] [facility] [id number] [place] [address]
0406  10 Dec, 2013   

 

 [facility]  [address] [facility] [id number] [address]
3876     

 

 [facility]  [address] [facility] [id number] [place] [address]-
5456     







IMMUNIZATIONS

No Known Immunizations



SOCIAL HISTORY

Never Assessed



REASON FOR VISIT

Controlled Substance VIOLATION



PLAN OF CARE





VITAL SIGNS





MEDICATIONS

Unknown Medications



RESULTS

No Results



PROCEDURES

No Known procedures



INSTRUCTIONS





MEDICATIONS ADMINISTERED

No Known Medications



MEDICAL (GENERAL) HISTORY







 Type  Description  Date

 

 Medical History  narcolepsy   

 

 Surgical History  Gallbladder removed  2015

 

 Surgical History  Hernia repair  2016

## 2018-09-02 NOTE — XMS REPORT
[facility]

 Created on: 10/26/2015



Sofiya Singh

External Reference #: [id number]

: 1955

Sex: Female



Demographics







 Address  [address] [place]  [address]

 

 Home Phone  [phone]

 

 Preferred Language  Unknown

 

 Marital Status  Unknown

 

 Yazidism Affiliation  Unknown

 

 Race  White

 

 Ethnic Group  Not  or 





Author







 Author  NAHUN SNYDER

 

 Organization  [employer]

 

 Address  Unknown

 

 Phone  Unavailable







Care Team Providers







 Care Team Member Name  Role  Phone

 

 NAHUN SNYDER  CP  Unavailable



                                                                



Allergies

          No Known Allergies                                                   
                                     



Problems

          





 Problem Type  Condition  Code  Onset Dates  Condition Status

 

 Problem  Screening examination for pulmonary tuberculosis  V74.1     Active

 

 Problem  Pain in joint, pelvic region and thigh  719.45     Active

 

 Problem  Pneumonia, organism unspecified  486     Active

 

 Problem  Family history of ischemic heart disease  V17.3     Active

 

 Problem  Family history of other cardiovascular diseases  V17.49     Active

 

 Problem  Unspecified arthropathy, site unspecified  716.90     Active

 

 Problem  Anxiety state, unspecified  300.00     Active

 

 Problem  Lumbago  724.2     Active

 

 Problem  Family history of malignant neoplasm of breast  V16.3     Active

 

 Problem  Family history of diabetes mellitus  V18.0     Active



                                                                               
                                                                               
                    



Medications

          No Known Medications                                                 
                             



Results

          No Known Results                                                     
               



Summary Purpose

          [employer] Submission

## 2018-09-02 NOTE — XMS REPORT
[facility]

 Created on: 10/13/2016



Samantha Danykath

External Reference #: [id number]

: 1955

Sex: Female



Demographics







 Address  [address] [place]  [address]

 

 Home Phone  [phone]

 

 Preferred Language  Unknown

 

 Marital Status  Unknown

 

 Episcopal Affiliation  Unknown

 

 Race  White

 

 Ethnic Group  Not  or 





Author







 Author  NAHUN SNYDER

 

 Organization  [employer]

 

 Address  Unknown

 

 Phone  Unavailable







Care Team Providers







 Care Team Member Name  Role  Phone

 

 NAHUN SNYDER  CP  Unavailable



                                                                



Allergies

          No Known Allergies                                                   
                                     



Problems

          





 Problem Type  Condition  Code  Onset Dates  Condition Status

 

 Problem  Screening examination for pulmonary tuberculosis  V74.1     Active

 

 Problem  Pain in joint, pelvic region and thigh  719.45     Active

 

 Problem  Pneumonia, organism unspecified  486     Active

 

 Problem  Family history of ischemic heart disease  V17.3     Active

 

 Problem  Family history of other cardiovascular diseases  V17.49     Active

 

 Problem  Unspecified arthropathy, site unspecified  716.90     Active

 

 Problem  Anxiety state, unspecified  300.00     Active

 

 Problem  Lumbago  724.2     Active

 

 Problem  Family history of malignant neoplasm of breast  V16.3     Active

 

 Problem  Family history of diabetes mellitus  V18.0     Active



                                                                               
                                                                               
                    



Medications

          





 Medication  Code System  Code  Instructions  Start Date  End Date  Status  
Dosage

 

 Hydrocodone-Acetaminophen  NDC  [address]-14   MG  every 6 hrs          1 tablet as needed

 

 Alprazolam  NDC  [address]-10  1 MG  Three times a day  2015        
1 tablet



                                                                               
         



Results

          No Known Results                                                     
               



Summary Purpose

          [employer] Submission

## 2018-09-02 NOTE — XMS REPORT
[facility]

 Created on: 2017



Sofiya Singh

External Reference #: [id number]

: 1955

Sex: Female



Demographics







 Address  [address] [place]  [address]

 

 Preferred Language  Unknown

 

 Marital Status  Unknown

 

 Jainism Affiliation  Unknown

 

 Race  Unknown

 

 Ethnic Group  Unknown





Author







 Author  NAHUN SNYDER

 

 Organization  [facility]

 

 Address  [address] [place]  [address]



 

 Phone  [phone]







Care Team Providers







 Care Team Member Name  Role  Phone

 

 NAHUN SNYDER  Unavailable  [phone]







PROBLEMS







 Type  Condition  ICD9-CM Code  JMZ29-SW Code  Onset Dates  Condition Status  
SNOMED Code

 

 Problem  Lumbago with sciatica, left side     M54.42     Active  615133860

 

 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 
    Active  [id number]

 

 Problem  ADHD (attention deficit hyperactivity disorder), inattentive type     
F90.0     Active  40105156

 

 Problem  Excessive daytime sleepiness     G47.19     Active  270512206046







ALLERGIES

Unknown Allergies



SOCIAL HISTORY

No smoking Hx information available



PLAN OF CARE





VITAL SIGNS





MEDICATIONS







 Medication  Instructions  Dosage  Frequency  Start Date  End Date  Duration  
Status

 

 Alprazolam 1 MG  Orally Three times a day  1 tablet  8h  30 Mar, 2015     28 
days  Active

 

 Hydrocodone-Acetaminophen  MG  Orally every 6 hrs  1 tablet as needed  
6h       28 days  Active







RESULTS

No Results



PROCEDURES

No Known procedures



IMMUNIZATIONS

No Known Immunizations

## 2018-09-02 NOTE — XMS REPORT
[facility]

 Created on: 2017



Sofiya Singh

External Reference #: [id number]

: 1955

Sex: Female



Demographics







 Address  [address] [place]  [address]

 

 Preferred Language  Unknown

 

 Marital Status  Unknown

 

 Holiness Affiliation  Unknown

 

 Race  Unknown

 

 Ethnic Group  Unknown





Author







 Author  NAHUN SNYDER

 

 [facility]

 

 Address  [address] [place]  [address]



 

 Phone  [phone]







Care Team Providers







 Care Team Member Name  Role  Phone

 

 NAHUN SNYDER  Unavailable  [phone]







PROBLEMS







 Type  Condition  ICD9-CM Code  THZ65-GM Code  Onset Dates  Condition Status  
SNOMED Code

 

 Problem  Lumbago with sciatica, left side     M54.42     Active  335610447

 

 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 
    Active  [id number]

 

 Problem  ADHD (attention deficit hyperactivity disorder), inattentive type     
F90.0     Active  20857712

 

 Problem  Excessive daytime sleepiness     G47.19     Active  029140098918







ALLERGIES

Unknown Allergies



SOCIAL HISTORY

No smoking Hx information available



PLAN OF CARE





VITAL SIGNS





MEDICATIONS







 Medication  Instructions  Dosage  Frequency  Start Date  End Date  Duration  
Status

 

 Tramadol HCl 50 mg  Orally every 6 hrs  1 tablet as needed  6h  02 Dec, 2016  
   28 days  Active







RESULTS

No Results



PROCEDURES

No Known procedures



IMMUNIZATIONS

No Known Immunizations

## 2018-09-02 NOTE — XMS REPORT
[facility]

 Created on: 2017



Samantha Danykath

External Reference #: [id number]

: 1955

Sex: Female



Demographics







 Address  [address] [place]  [address]

 

 Preferred Language  Unknown

 

 Marital Status  Unknown

 

 Congregational Affiliation  Unknown

 

 Race  Unknown

 

 Ethnic Group  Unknown





Author







 Author  NAHUN SNYDER

 

 Organization  [facility]

 

 Address  [address] [place]  [address]



 

 Phone  [phone]







Care Team Providers







 Care Team Member Name  Role  Phone

 

 NAHUN SNYDER  Unavailable  [phone]







PROBLEMS







 Type  Condition  ICD9-CM Code  MAM41-JB Code  Onset Dates  Condition Status  
SNOMED Code

 

 Problem  Lumbago  724.2        Active  139170171

 

 Problem  Family history of diabetes mellitus  V18.0        Active  456269084

 

 Problem  Anxiety state, unspecified  300.00        Active  424114292

 

 Problem  Screening examination for pulmonary tuberculosis  V74.1        Active
  981979070

 

 Problem  Pneumonia, organism unspecified  486        Active  020336943

 

 Problem  Pain in joint, pelvic region and thigh  719.45        Active  
908908586

 

 Problem  Excessive daytime sleepiness     G47.19     Active  251977132184

 

 Problem  ADHD (attention deficit hyperactivity disorder), inattentive type     
F90.0     Active  66799729

 

 Problem  Family history of other cardiovascular diseases  V17.49        Active
  479655584

 

 Problem  Family history of malignant neoplasm of breast  V16.3        Active  
431089147

 

 Problem  Unspecified arthropathy, site unspecified  716.90        Active  
668974422

 

 Problem  Family history of ischemic heart disease  V17.3        Active  
093723207







ALLERGIES

Unknown Allergies



SOCIAL HISTORY

No smoking Hx information available



PLAN OF CARE





VITAL SIGNS





MEDICATIONS







 Medication  Instructions  Dosage  Frequency  Start Date  End Date  Duration  
Status

 

 Hydrocodone-Acetaminophen  MG  Orally every 6 hrs  1 tablet as needed  
6h  12 Dec, 2016        Active

 

 Alprazolam 1 MG     1 tablet  8h  30 Mar, 2015        Active







RESULTS

No Results



PROCEDURES

No Known procedures



IMMUNIZATIONS

No Known Immunizations

## 2018-09-02 NOTE — XMS REPORT
[facility]

 Created on: 2018



Sofiya Singh

External Reference #: [id number]

: 1955

Sex: Female



Demographics







 Address  [address] [place]  [address]

 

 Preferred Language  Unknown

 

 Marital Status  Unknown

 

 Baptism Affiliation  Unknown

 

 Race  Unknown

 

 Ethnic Group  Unknown





Author







 Author  NAHUN SNYDER

 

 Organization  [facility]

 

 Address  [address] [place]  [address]



 

 Phone  [phone]







Care Team Providers







 Care Team Member Name  Role  Phone

 

 NAHUN SNYDER  Unavailable  [phone]







PROBLEMS







 Type  Condition  ICD9-CM Code  TGC47-YB Code  Onset Dates  Condition Status  
SNOMED Code

 

 Problem  ADHD (attention deficit hyperactivity disorder), inattentive type     
F90.0     Active  73516444

 

 Problem  Arthritis     M19.90     Active  8802075

 

 Problem  Positive skin test for tuberculosis     R76.11     Active  360879861

 

 Problem  Lumbago with sciatica, left side     M54.42     Active  623273187

 

 Problem  Excessive daytime sleepiness     G47.19     Active  887145878978

 

 Problem  Primary narcolepsy without cataplexy     G47.419     Active  
[id number]

 

 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 
    Active  [id number]







ALLERGIES

No Information



ENCOUNTERS







 Encounter  Location  Date  Diagnosis

 

 [place] [id number] [place] [address] 00205-
6516     

 

 [place] [id number] [place] [address] 75310-
3756  06 2018  Right lower quadrant abdominal pain R10.31 and Rash R21

 

 [place] [address] [address]-
2121  14 Mar, 2018  ADHD (attention deficit hyperactivity disorder), 
inattentive type F90.0 ; Lumbago with sciatica, left side M54.42 and Arthritis 
M19.90

 

 [place] [address] [address]-
2893    ADHD (attention deficit hyperactivity disorder), 
inattentive type F90.0 and Lumbar neuritis M54.16

 

 [place] [id number] [place] [address] 37175-
8564     

 

 [facility]  [address] [place] [address] 82974-
1258    Lumbar neuritis M54.16

 

 [facility]  [id number] [place] [address] 01958-
6931  16 2018  Low back pain M54.5

 

 [facility]  [id number] [place] [address] 33857-
2717    ADHD (attention deficit hyperactivity disorder), 
inattentive type F90.0

 

 [facility]  [id number] [place] [address] 90029-
8435    Positive skin test for tuberculosis R76.11

 

 [facility]  [id number] [place] [address] 90831-
8072    Positive skin test for tuberculosis R76.11

 

 [facility]  [id number] [place] [address] 82140-
5253     

 

 [facility]  [id number] [place] [address] 70260-
5654    Lumbar neuritis M54.16

 

 [facility]  [id number] [place] [address] 30669-
6867    ADHD (attention deficit hyperactivity disorder), 
inattentive type F90.0

 

 [facility]  [id number] [place] [address] 52160-
7840     

 

 [facility]  [id number] [place] [address] 88520-
0621  20 Dec, 2017  Lumbar neuritis M54.16

 

 [facility]  [id number] [place] [address] 06438-
8579  15 Dec, 2017  ADHD (attention deficit hyperactivity disorder), 
inattentive type F90.0

 

 [facility]  [id number] [place] [address] 95706-
8581  12 Dec, 2017   

 

 [facility]  [id number] [place] [address] 30458-
7282  11 Dec, 2017   

 

 [facility]  [address] [place] [address] 31057-
6848    Lumbar neuritis M54.16

 

 [facility]  [id number] [place] [address] 92340-
4604    ADHD (attention deficit hyperactivity disorder), 
inattentive type F90.0 and Primary narcolepsy without cataplexy G47.419

 

 [facility]  [id number] [place] [address] 41413-
4476  10 Nov, 2017   

 

 [facility]  [id number] [place] [address] 29573-
1470     

 

 [facility]  [id number] [place] [address] 51253-
5090  23 Oct, 2017  Lumbar neuritis M54.16 and ADHD (attention deficit 
hyperactivity disorder), inattentive type F90.0

 

 [facility]  [id number] [place] [address] 54407-
7483  12 Oct, 2017   

 

 [facility]  [id number] [place] [address] 70069-
8115  26 Sep, 2017  Lumbar neuritis M54.16 and ADHD (attention deficit 
hyperactivity disorder), inattentive type F90.0

 

 [facility]  [id number] [place] [address] 38598-
5112  19 Sep, 2017   

 

 [facility]  [id number] [place] [address] 07222-
6892  31 Aug, 2017  ADHD (attention deficit hyperactivity disorder), 
inattentive type F90.0 and Lumbar neuritis M54.16

 

 [facility]  [id number] [place] [address] 18712-
9332  24 Aug, 2017  Lumbar neuritis M54.16

 

 [facility]  [id number] [place] [address] 19171-
0673  23 Aug, 2017   

 

 [facility]  [id number] [place] [address] 74349-
8944  02 Aug, 2017  ADHD (attention deficit hyperactivity disorder), 
inattentive type F90.0 and Lumbar neuritis M54.16

 

 [facility]  [address] [place] [address]-
0031  02 Aug, 2017   

 

 [facility]  [id number] [address]-
9728     

 

 [facility]  [id number] [place] [address] 70454-
6303     

 

 [facility]  [id number] [place] [address] 37655-
6500     

 

 [facility]  [id number] [place] [address] 35060-
7756     

 

 [facility]  [id number] [place] [address] 81746-
4614    ADHD (attention deficit hyperactivity disorder), 
inattentive type F90.0 and Lumbar neuritis M54.16

 

 [facility]  [address] [place] [address]-
8206     

 

 [facility]  [id number] [place] [address] 31008-
6368     

 

 [facility]  [address] [place] [address] 63869-
9790    Lumbar neuritis M54.16 and ADHD (attention deficit 
hyperactivity disorder), inattentive type F90.0

 

 [facility]  [address] [address]-
0485     

 

 [facility]  [id number] [place] [address] 39464-
4493  10 May, 2017  Lumbar neuritis M54.16 and ADHD (attention deficit 
hyperactivity disorder), inattentive type F90.0

 

 [facility]  [address] [address]-
7694  03 May, 2017   

 

 [facility]  [id number] [place] [address] 32200-
7809  01 May, 2017   

 

 [facility]  [address] [place] [address] 85635-
2206    Narcolepsy due to underlying condition without cataplexy 
G47.429 and Lumbago with sciatica, left side M54.42

 

 [facility]  [id number] [place] [address] 87662-
5435    Lumbar neuritis M54.16 and ADHD (attention deficit 
hyperactivity disorder), inattentive type F90.0

 

 [facility]  [address] [place] [address] 70976-
3739  31 Mar, 2017   

 

 [facility]  [id number] [place] [address] 60872-
9451  15 Mar, 2017  Lumbar neuritis M54.16 and ADHD (attention deficit 
hyperactivity disorder), inattentive type F90.0

 

 [facility]  [address] [place] [address] 06729-
9028  15 Mar, 2017   

 

 [facility]  [address] [place] [address] 14524-
5080  06 Mar, 2017   

 

 [facility]  [address] [place] [address] 42969-
3705  15 Feb, 2017  ADHD (attention deficit hyperactivity disorder), 
inattentive type F90.0

 

 [facility]  [id number] [place] [address] 11583-
4752  15 Feb, 2017  Lumbar neuritis M54.16

 

 [facility]  [id number] [place] [address] 14550-
0012     

 

 [facility]  [id number] [place] [address] 12300-
1535     

 

 [facility]  [address] [place] [address] 11077-
7452    Attention deficit hyperactivity disorder (ADHD), 
predominantly inattentive type F90.0

 

 [facility]  [id number] [address]-
8759     

 

 [facility]  [id number] [place] [address] 81494-
6698  10 Niall, 2017   

 

 [facility]  [id number] [place] [address] 69501-
7075     

 

 [facility]  [id number] [place] [address] 00206-
8877  22 Dec, 2016  Attention deficit hyperactivity disorder (ADHD), 
predominantly inattentive type F90.0

 

 [facility]  [address] [place] [address] 13580-
5155  12 Dec, 2016   

 

 [facility]  [address] [place] [address] 24146-
1181  07 Dec, 2016   

 

 [facility]  [address] [place] [address] 78363-
2800  05 Dec, 2016   

 

 [facility]  [id number] [place] [address] 83424-
0098  05 Dec, 2016  Low TSH level R94.6

 

 [facility]  [address] [place] [address] 30434-
9562  05 Dec, 2016   

 

 [facility]  [address] [place] [address] 95644-
9855  02 Dec, 2016   

 

 [facility]  [address] [place] [address] 31947-
6818  10 Nov, 2016   

 

 [facility]  [address] [place] [address] 96225-
8092  10 Nov, 2016   

 

 [facility]  [address] [place] [address] 25738-
1831     

 

 [facility]  [address] [place] [address] 53588-
0697  18 Oct, 2016  Low TSH level R94.6

 

 [facility]  [address] [place] [address] 24504-
5694  17 Oct, 2016  Excessive daytime sleepiness G47.19 and ADHD (attention 
deficit hyperactivity disorder), inattentive type F90.0

 

 [facility]  [address] [place] [address] 36959-
7640  13 Oct, 2016   

 

 [facility]  [address] [place] [address] 90272-
0677  12 Oct, 2016   

 

 [facility]  [address] [place] [address] 88371-
0010  03 Oct, 2016   

 

 [facility]  [address] [place] [address] 76214-
2037  28 Sep, 2016   

 

 [facility]  [address] [place] [address] 61674-
5621  14 Sep, 2016   

 

 [facility]  [address] [place] [address] 74625-
3903  01 Sep, 2016   

 

 [facility]  [address] [place] [address] 43586-
5323  17 Aug, 2016   

 

 [facility]  [address] [place] [address] 80900-
4334  04 Aug, 2016   

 

 [facility]  [address] [place] [address] 04918-
0391  03 Aug, 2016   

 

 [facility]  [address] [place] [address] 73129-
1902    Lumbar neuritis M54.16

 

 [facility]  [id number] [place] [address] 46196-
7949     

 

 [facility]  [id number] [place] [address] 04339-
5353     

 

 [facility]  [address] [place] [address] 47298-
7963    Lumbar neuritis M54.16

 

 [facility]  [id number] [place] [address] 24840-
6880     

 

 [facility]  [id number] [place] [address] 20594-
3643     

 

 [facility]  [address] [place] [address]-
5398  26 May, 2016  Lumbar neuritis M54.16

 

 [facility]  [address] [place] [address]-
3059  25 May, 2016   

 

 [facility]  [address] [place] [address] 68462-
7299  10 May, 2016   

 

 [facility]  [address] [place] [address] 88209-
1495    Lumbar neuritis M54.16

 

 [facility]  [id number] [place] [address] 20744-
6730     

 

 [facility]  [address] [place] [address] 83012-
4517     

 

 [facility]  [address] [place] [address] 36417-
9274  23 Mar, 2016   

 

 [facility]  [address] [place] [address]-
5404  14 Mar, 2016   

 

 [facility]  [address] [place] [address]-
5489  14 Mar, 2016   

 

 [facility]  [address] [place] [address]-
4404  11 Mar, 2016   

 

 [facility]  [address] [place] [address]-
6430  24 2016   

 

 [facility]  [address] [place] [address]-
3074    Inguinal hernia, right K40.90

 

 [facility]  [address] [place] [address]-
2761  17 2016   

 

 [facility]  [address] [place] [address]-
4189  15 2016  Low back pain M54.5 and Sciatica, unspecified side M54.30

 

 [facility]  [address] [facility] [id number] [address]-
5721     

 

 [facility]  [address] [facility] [id number] [address]-
5015     

 

 [facility]  [address] [facility] [id number] [address]-
6506  30 Dec, 2015   

 

 [facility]  [address] [facility] [id number] [place] [address] 24145-
7184  28 Dec, 2015   

 

 [facility]  [address] [facility] [id number] [place] [address]-
7934  02 Dec, 2015   

 

 [facility]  [id number] [place] [address] 78034-
2041     

 

 [facility]  [address] [place] [address]-
6104     

 

 [facility]  [address] [place] [address] 76001-
6240     

 

 [facility]  [id number] [address]-
7175     

 

 [facility]  [address] [place] [address]-
8938  26 Oct, 2015   

 

 [facility]  [address] [place] [address]-
1432  22 Oct, 2015  Encounter for immunization Z23

 

 [facility]  [address] [facility] [id number] [address]-
1490  07 Oct, 2015   

 

 [facility]  [address] [facility] [id number] [address]-
6665  05 Oct, 2015   

 

 [facility]  [id number] [address]-
7785  09 Sep, 2015   

 

 [facility]  [id number], [place] [address]-
9939  08 Sep, 2015   

 

 [facility]  [address] [place] [address]-
4614  19 Aug, 2015  Lumbago 724.2

 

 [facility] [place] [facility]  [address] [id number] [place] [address]-
8280  12 Aug, 2015   

 

 [facility] [place] [facility]  [address] [id number] [place] [address]-
4041    Lumbago 724.2

 

 [facility] [place] [facility]  [address] [id number] [place] [address]-
1413     

 

 [facility] [place] [facility]  [address] [id number] [place] [address]-
1512     

 

 [facility] [place] [facility]  [address] [id number] [place] [address]-
4958     

 

 [facility] [place] [facility]  [address] [id number] [place] [address]-
9656     

 

 [facility] [place] [facility]  [address] [id number] [place] [address]-
4104     

 

 [facility] [place] [facility]  [address] [id number] [place] [address]-
2437  22 May, 2015   

 

 [facility] [place] [facility]  [address] [id number] [place] [address]-
6347     

 

 [facility] [place] [facility]  [address] [id number] [place] [address]-
6739     

 

 [facility] [place] [facility]  [address] [id number] [place] [address]-
3755  30 Mar, 2015   

 

 [facility] [place] [facility]  [address] [id number] [place] [address]-
8351  30 Mar, 2015   

 

 [facility] [place] [facility]  [address] [id number] [place] [address]-
7683  02 Mar, 2015   

 

 [facility] [place] [facility]  [address] [id number] [place] [address]-
5171  02 Mar, 2015   

 

 [facility] [place] [facility]  [address] [id number] [place] [address]-
0279  ,    

 

 [facility] [place] [facility]  [address] [id number] [place] [address]-
4811  ,    

 

 [facility] [place] [facility]  [address] [id number] [place] [address]-
1836     

 

 [facility] [place] [facility]  [address] [id number] [place] [address]-
5596     

 

 [facility] [place] [facility]  [address] [id number] [place] [address]-
3900     

 

 [facility] [place] [facility]  [address] [id number] [place] [address]-
0704     

 

 [facility] [place] [facility]  [address] [id number] [place] [address]-
4351  31 Dec, 2014   

 

 [facility] [place] [facility]  [address] [id number] [place] [address]-
2889  31 Dec, 2014   

 

 [facility] [place] [facility]  [address] [id number] [place] [address]-
8565  22 Dec, 2014   

 

 [facility] [place] [facility]  [address] [id number] [place] [address]-
3442  22 Dec, 2014   

 

 [facility] [place] [facility]  [address] [facility] [id number] [place] [address]-
1783  08 Dec, 2014   

 

 [facility] [place] [facility]  [address] [id number] [place] [address]-
8323  08 Dec, 2014   

 

 [facility] [place] [facility]  [address] [id number] [place] [address]-
7722  10 Nov, 2014   

 

 [facility] [place] [facility]  [address] [id number] [place]-
9412  10 Nov, 2014   

 

 [facility] [place] [facility]  [address] [id number] [place]-
4246  13 Oct, 2014   

 

 [facility] [place] [facility]  [address] [id number] [place]-
3617  13 Oct, 2014   

 

 [facility] [place] [facility]  [address] [id number] [place] [address]-
6658  01 Oct, 2014   

 

 [facility] [place] [facility]  [address] [id number] [place]-
2135  01 Oct, 2014   

 

 [facility] [place] [facility]  [address] [id number] [place] [address]-
5262  15 Sep, 2014   

 

 [facility] [place] [facility]  [address] [id number] [place] [address]-
1414  15 Sep, 2014   

 

 [facility] [place] [facility]  [address] [id number] [place] [address]-
0817  18 Aug, 2014   

 

 [facility] [place] [facility]  [address] [id number] [place] [address]-
5025  18 Aug, 2014   

 

 [facility] [place] [facility]  [address] [id number] [place] [address]-
4083  18 Aug, 2014   

 

 [facility] [place] [facility]  [address] [id number] [place] [address]-
9413  18 Aug, 2014   

 

 [facility] [place] [facility]  [address] [id number] [place] [address]-
7865     

 

 [facility] [place] [facility]  [address] [id number] [place] [address]-
6077     

 

 [facility] [place] [facility]  [address] [id number] [place] [address]-
2334     

 

 [facility] [place] [facility]  [address] [id number] [place] [address]-
6458     

 

 [facility] [place] [facility]  [address] [id number] [place] [address]-
5433     

 

 [facility] [place] [facility]  [address] [id number] [place] [address]-
6049     

 

 [facility] [place] [facility]  [address] [id number] [place] [address]-
2378  30 May, 2014   

 

 [facility] [place] [facility]  [address] [id number] [place] [address]-
3185  28 May, 2014   

 

 [facility] [place] [facility]  [address] [id number] [place] [address]-
6314  28 May, 2014   

 

 [facility] [place] [facility]  [address] [id number] [place] [address]-
8457     

 

 [facility] [place] [facility]  [address] [id number] [place] [address]-
2152     

 

 [facility] [place] [facility]  [address] [id number] [place] [address]-
6083     

 

 [facility] [place] [facility]  [address] [id number] [place] [address]-
6011     

 

 [facility] [place] [facility]  [address] [id number] [place] [address]-
1977     

 

 [facility] [place] [facility]  [address] [id number] [place] [address]-
8788     

 

 [facility] [place] [facility]  [address] [id number] [place] [address]-
4254     

 

 [facility] [place] [facility]  [address] [id number] [place] [address]-
2254     

 

 [facility] [place] [facility]  [address] [id number] [place] [address]-
1332     

 

 [facility] [place] [facility]  [address] [id number] [place] [address]-
1077     

 

 [facility] [place] [facility]  [address] [id number] [place] [address]-
4165     

 

 [facility] [place] [facility]  [address] [id number] [place] [address]-
4276  28 Mar, 2014   

 

 [facility] [place] [facility]  [address] [id number] [place] [address]-
4706  27 Mar, 2014   

 

 [facility] [place] [facility]  [address] [id number] [place] [address]-
4[address]  27 Mar, 2014   

 

 [facility] [place] [facility]  [address] [id number] [place] [address]-
9[address]  26 Mar, 2014   

 

 [facility] [place] [facility]  [address] [id number] [place] [address]-
33[address]  26 Mar, 2014   

 

 [facility] [place] [facility]  [address] [id number] [place] [address]-
[address]     

 

 [facility] [place] [facility]  [address] [id number] [place] [address]-
4059     

 

 [facility] [place] [facility]  [address] [id number] [place] [address]-
8370     

 

 [facility] [place] [facility]  [address] [id number] [place] [address]-
6297     

 

 [facility] [place] [facility]  [address] [id number] [place] [address]-
1252     

 

 [facility] [place] [facility]  [address] [id number] [place] [address]-
1113     

 

 [facility] [place] [facility]  [address] [id number] [place] [address]-
6805     

 

 [facility] [place] [facility]  [address] [id number] [place] [address]-
3067     

 

 [facility] [place] [facility]  [address] [id number] [place] [address]-
0492     

 

 [facility] [place] [facility]  [address] [id number] [place] [address]-
0472     

 

 [facility] [place] [facility]  [address] [id number] [place] [address]-
9368     

 

 [facility] [place] [facility]  [address] [id number] [place] [address]-
9146     

 

 [facility] [place] [facility]  [address] [id number] [place] [address]-
0472     

 

 [facility] [place] [facility]  [address] [address] [id number] [place] [address]
2546  10 Dec, 2013   

 

 [facility]  [address] [id number] [place] [address]
2546  10 Dec, 2013   

 

 [facility]  [address] [id number], [place] [address]46     

 

 [facility]  [address] [id number], [place] [address]
2546     







IMMUNIZATIONS

No Known Immunizations



SOCIAL HISTORY

Never Assessed



REASON FOR VISIT

Controlled Med Refill 18



PLAN OF CARE





VITAL SIGNS





MEDICATIONS







 Medication  Instructions  Dosage  Frequency  Start Date  End Date  Duration  
Status

 

 Adderall XR 30 MG  Orally Once a day  1 capsule in the morning  24h       28 days  Active







RESULTS

No Results



PROCEDURES

No Known procedures



INSTRUCTIONS





MEDICATIONS ADMINISTERED

No Known Medications



MEDICAL (GENERAL) HISTORY







 Type  Description  Date

 

 Medical History  narcolepsy   

 

 Surgical History  Gallbladder removed  2015

 

 Surgical History  Hernia repair  2016

## 2018-09-02 NOTE — XMS REPORT
[facility]

 Created on: 2017



Sofiya Singh

External Reference #: [id number]

: 1955

Sex: Female



Demographics







 Address  [address] [place]  [address]

 

 Preferred Language  Unknown

 

 Marital Status  Unknown

 

 Cheondoism Affiliation  Unknown

 

 Race  Unknown

 

 Ethnic Group  Unknown





Author







 Author  NAHUN SNYDER

 

 Organization  [facility]

 

 Address  [address] [place]  [address]



 

 Phone  [phone]







Care Team Providers







 Care Team Member Name  Role  Phone

 

 NAHUN SNYDER  Unavailable  [phone]







PROBLEMS







 Type  Condition  ICD9-CM Code  FHY15-ZE Code  Onset Dates  Condition Status  
SNOMED Code

 

 Problem  Lumbago with sciatica, left side     M54.42     Active  167564868

 

 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 
    Active  [id number]

 

 Problem  ADHD (attention deficit hyperactivity disorder), inattentive type     
F90.0     Active  71219917

 

 Problem  Excessive daytime sleepiness     G47.19     Active  [id number]







ALLERGIES

No Information



SOCIAL HISTORY

Never Assessed



PLAN OF CARE





VITAL SIGNS





MEDICATIONS







 Medication  Instructions  Dosage  Frequency  Start Date  End Date  Duration  
Status

 

 Tramadol HCl 50 MG  Orally every 6 hrs  1 tablet as needed  6h  02 Dec, 2016  
   28 days  Active

 

 Adderall XR 20 MG  Orally Once a day  1 capsule in the morning  24h  12 May, 
2017     28 days  Active







RESULTS

No Results



PROCEDURES

No Known procedures



IMMUNIZATIONS

No Known Immunizations



MEDICAL (GENERAL) HISTORY







 Type  Description  Date

 

 Surgical History  Gallbladder removed  2015

 

 Surgical History  Hernia repair  2016

## 2018-09-02 NOTE — XMS REPORT
[facility]

 Created on: 2016



Sofiya Singh

External Reference #: [id number]

: 1955

Sex: Female



Demographics







 Address  [address] [place]  [address]

 

 Home Phone  [phone]

 

 Preferred Language  Unknown

 

 Marital Status  Unknown

 

 [employer] Affiliation  Unknown

 

 Race  White

 

 Ethnic Group  Not  or 





Author







 NAHUN Valente

 

 Organization  [employer]

 

 Address  Unknown

 

 Phone  Unavailable







Care Team Providers







 Care Team Member Name  Role  Phone

 

 NAHUN SNYDER  CP  Unavailable



                                                                



Allergies

          No Known Allergies                                                   
                                     



Problems

          





 Problem Type  Condition  Code  Onset Dates  Condition Status

 

 Problem  Pain in joint, pelvic region and thigh  719.45     Active

 

 Problem  Anxiety state, unspecified  300.00     Active

 

 Problem  Lumbago  724.2     Active

 

 Problem  Screening examination for pulmonary tuberculosis  V74.1     Active

 

 Problem  Pneumonia, organism unspecified  486     Active

 

 Problem  ADHD (attention deficit hyperactivity disorder), inattentive type  
F90.0     Active

 

 Problem  Unspecified arthropathy, site unspecified  716.90     Active

 

 Problem  Excessive daytime sleepiness  G47.19     Active

 

 Problem  Family history of malignant neoplasm of breast  V16.3     Active

 

 Problem  Family history of diabetes mellitus  V18.0     Active

 

 Problem  Family history of ischemic heart disease  V17.3     Active

 

 Problem  Family history of other cardiovascular diseases  V17.49     Active



                                                                               
                                                                               
                                        



Medications

          No Known Medications                                                 
                             



Results

          No Known Results                                                     
               



Summary Purpose

          [employer]

## 2018-09-02 NOTE — XMS REPORT
[facility]

 Created on: 10/19/2016



Samantha Danykath

External Reference #: [id number]

: 1955

Sex: Female



Demographics







 Address  [address] [place]  [address]

 

 Home Phone  [phone]

 

 Preferred Language  Unknown

 

 Marital Status  Unknown

 

 Confucianist Affiliation  Unknown

 

 Race  White

 

 Ethnic Group  Not  or 





Author







 NAHUN Valente

 

 [facility]  [employer]

 

 Address  Unknown

 

 Phone  Unavailable







Care Team Providers







 Care Team Member Name  Role  Phone

 

 NAHUN SNYDER  CP  Unavailable



                                                                



Allergies, Adverse Reactions, Alerts

          





 Substance  Reaction  Event Type

 

 Penicillins  Info Not Available  Non Drug Allergy



                                                                               
         



Problems

          





 Problem Type  Condition  Code  Onset Dates  Condition Status

 

 Problem  Pain in joint, pelvic region and thigh  719.45     Active

 

 Problem  Anxiety state, unspecified  300.00     Active

 

 Problem  Lumbago  724.2     Active

 

 Problem  ADHD (attention deficit hyperactivity disorder), inattentive type  
F90.0     Active

 

 Problem  Unspecified arthropathy, site unspecified  716.90     Active

 

 Problem  Excessive daytime sleepiness  G47.19     Active

 

 Problem  Family history of malignant neoplasm of breast  V16.3     Active

 

 Problem  Family history of diabetes mellitus  V18.0     Active

 

 Problem  Family history of ischemic heart disease  V17.3     Active

 

 Problem  Family history of other cardiovascular diseases  V17.49     Active

 

 Assessment  ADHD (attention deficit hyperactivity disorder), inattentive type  
F90.0     Active

 

 Assessment  Excessive daytime sleepiness  G47.19     Active

 

 Problem  Screening examination for pulmonary tuberculosis  V74.1     Active

 

 Problem  Pneumonia, organism unspecified  486     Active



                                                                               
                                                                               
                                                            



Medications

          





 Medication  Code System  Code  Instructions  Start Date  End Date  Status  
Dosage

 

 Hydrocodone-Acetaminophen  NDC  [address]-79   MG  every 6 hrs          1 tablet as needed

 

 tramadol  NDC  0  50 mg    2015        take 1 tablet (50 mg) by oral 
route every 6 hours as needed

 

 Adderall  NDC  [address]-92  10 mg Orally Once a day  Oct 17, 2016        1 
tablet in the morning

 

 Alprazolam  NDC  [address]-10  1 MG  Three times a day  2015        
1 tablet



                                                                               
                                       



Procedures

          





 Procedure  Coding System  Code  Date

 

 VENIPUNCT, ROUTINE*  CPT-4  62508  Oct 17, 2016

 

 COMPLETE CBC W/AUTO DIFF WBC  CPT-4  92364  Oct 17, 2016

 

 Office Visit, Est Pt., Level 3  CPT-4  89973  Oct 17, 2016

 

 ASSAY THYROID STIM HORMONE  CPT-4  55478  Oct 17, 2016

 

 COMPREHEN METABOLIC PANEL  CPT-4  77250  Oct 17, 2016



                                                                               
                                                           



Vital Signs

          





 Date/Time:  Oct 17, 2016

 

 Cardiac Monitoring Heart Rate  82 bpm

 

 Weight  116.4 lbs

 

 Height  59 in

 

 BMI  23.51 Index

 

 Blood Pressure Diastolic  82 mmHg

 

 Blood Pressure Systolic  134 mmHg



                                                                    



Results

          





 Name  Result  Date  Reference Range  Unit  Abnormality Flag

 

 CBC               

 

 ----Lymphs  35  79743666     %   

 

 ----Neutrophils  58  56256748     %   

 

 ----Baso (Absolute)  0.0  21895322  0.0-0.2   x10E3/uL   

 

 ----Hemoglobin  13.6  62793598  11.1-15.9   g/dL   

 

 ----Eos (Absolute)  0.1  91418855  0.0-0.4   x10E3/uL   

 

 ----Hematocrit  40.5  42636155  34.0-46.6   %   

 

 ----Monocytes(Absolute)  0.3  20613554  0.1-0.9   x10E3/uL   

 

 ----MCV  94  81298320  79-97   fL   

 

 ----Lymphs (Absolute)  1.9  01095853  0.7-3.1   x10E3/uL   

 

 ----MCH  31.7  38100367  26.6-33.0   pg   

 

 ----Neutrophils (Absolute)  3.1  05451002  1.4-7.0   x10E3/uL   

 

 ----MCHC  33.6  60196169  31.5-35.7   g/dL   

 

 ----Immature Granulocytes  0  92774269     %   

 

 ----Basos  0  27667274     %   

 

 ----RDW  14.2  50971331  12.3-15.4   %   

 

 ----Immature Grans (Abs)  0.0  16182759  0.0-0.1   x10E3/uL   

 

 ----WBC  5.4  14011211  3.4-10.8   x10E3/uL   

 

 ----Platelets  176  69030373  150-379   x10E3/uL   

 

 ----Eos  2  60259468     %   

 

 ----RBC  4.29  25805760  3.77-5.28   x10E6/uL   

 

 ----Monocytes  5  10853321     %   

 

 CMP               

 

 ----Globulin, Total  2.5  90728743  1.5-4.5   g/dL   

 

 ----eGFR If Africn Am  95  53865741      >59   mL/min/1.73   

 

 ----eGFR If NonAfricn Am  82  2016      >59   mL/min/1.73   

 

 ----Albumin, Serum  4.2  57277306  3.5-5.5   g/dL   

 

 ----Sodium, Serum  138  69246213  136-144   mmol/L   

 

 ----Protein, Total, Serum  6.7  18186795  6.0-8.5   g/dL   

 

 ----BUN/Creatinine Ratio  15  2016  9-23       

 

 ----Calcium, Serum  9.2  05089952  8.7-10.2   mg/dL   

 

 ----AST (SGOT)  25  33085461  0-40   IU/L   

 

 ----Glucose, Serum  96  00507993  65-99   mg/dL   

 

 ----Alkaline Phosphatase, S  83  2016     IU/L   

 

 ----Bilirubin, Total  0.3  26850882  0.0-1.2   mg/dL   

 

 ----Creatinine, Serum  0.79  26405599  0.57-1.00   mg/dL   

 

 ----A/G Ratio  1.7  03800761  1.1-2.5       

 

 ----BUN  12  2016  6-24   mg/dL   

 

 ----Carbon Dioxide, Total  24  06496670  18-29   mmol/L   

 

 ----ALT (SGPT)  12  2016  0-32   IU/L   

 

 ----Potassium, Serum  4.0  20489679  3.5-5.2   mmol/L   

 

 ----Chloride, Serum  101  59964970     mmol/L   

 

 ROUTINE VENIPUNCTURE               

 

 TSH               

 

 ----TSH  0.356  66152497  0.450-4.500   uIU/mL  L



                                                                               
                   



Summary Purpose

          [employer] Submission

## 2018-09-02 NOTE — XMS REPORT
[facility]

 Created on: 2018



Sofiya Singh

External Reference #: [id number]

: 1955

Sex: Female



Demographics







 Address  [address] [place]  [address]

 

 Preferred Language  Unknown

 

 Marital Status  Unknown

 

 Druze Affiliation  Unknown

 

 Race  Unknown

 

 Ethnic Group  Unknown





Author







 Author  NAHUN SNYDER

 

 Organization  [facility]

 

 Address  [address] [place]  [address]



 

 Phone  [phone]







Care Team Providers







 Care Team Member Name  Role  Phone

 

 NAHUN SNYDER  Unavailable  [phone]







PROBLEMS







 Type  Condition  ICD9-CM Code  BMW15-MW Code  Onset Dates  Condition Status  
SNOMED Code

 

 Problem  ADHD (attention deficit hyperactivity disorder), inattentive type     
F90.0     Active  11827422

 

 Problem  Arthritis     M19.90     Active  4425228

 

 Problem  Positive skin test for tuberculosis     R76.11     Active  320038718

 

 Problem  Lumbago with sciatica, left side     M54.42     Active  628174347

 

 Problem  Excessive daytime sleepiness     G47.19     Active  107976174828

 

 Problem  Primary narcolepsy without cataplexy     G47.419     Active  
[id number]

 

 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 
    Active  [id number]







ALLERGIES

No Information



ENCOUNTERS







 Encounter  Location  Date  Diagnosis

 

 [place] [id number] [place] [address] 41835-
7772     

 

 [place] [id number] [place] [address] 07278-
8221  06 2018  Right lower quadrant abdominal pain R10.31 and Rash R21

 

 [place] [address] [address]-
6829  14 Mar, 2018  ADHD (attention deficit hyperactivity disorder), 
inattentive type F90.0 ; Lumbago with sciatica, left side M54.42 and Arthritis 
M19.90

 

 [place] [address] [address]-
0950    ADHD (attention deficit hyperactivity disorder), 
inattentive type F90.0 and Lumbar neuritis M54.16

 

 [place] [id number] [place] [address] 83061-
6621     

 

 [facility]  [address] [place] [address] 90024-
1513    Lumbar neuritis M54.16

 

 [facility]  [id number] [place] [address] 47044-
2125  16 2018  Low back pain M54.5

 

 [facility]  [id number] [place] [address] 77251-
5581    ADHD (attention deficit hyperactivity disorder), 
inattentive type F90.0

 

 [facility]  [id number] [place] [address] 57501-
3964    Positive skin test for tuberculosis R76.11

 

 [facility]  [id number] [place] [address] 72110-
8759    Positive skin test for tuberculosis R76.11

 

 [facility]  [id number] [place] [address] 84774-
8886     

 

 [facility]  [id number] [place] [address] 63729-
5295    Lumbar neuritis M54.16

 

 [facility]  [id number] [place] [address] 33888-
2576    ADHD (attention deficit hyperactivity disorder), 
inattentive type F90.0

 

 [facility]  [id number] [place] [address] 45078-
8304     

 

 [facility]  [id number] [place] [address] 42043-
8781  20 Dec, 2017  Lumbar neuritis M54.16

 

 [facility]  [id number] [place] [address] 85305-
7009  15 Dec, 2017  ADHD (attention deficit hyperactivity disorder), 
inattentive type F90.0

 

 [facility]  [id number] [place] [address] 37201-
6968  12 Dec, 2017   

 

 [facility]  [id number] [place] [address] 32632-
1659  11 Dec, 2017   

 

 [facility]  [address] [place] [address] 04899-
5208    Lumbar neuritis M54.16

 

 [facility]  [id number] [place] [address] 43462-
1023    ADHD (attention deficit hyperactivity disorder), 
inattentive type F90.0 and Primary narcolepsy without cataplexy G47.419

 

 [facility]  [id number] [place] [address] 87418-
5707  10 Nov, 2017   

 

 [facility]  [id number] [place] [address] 34034-
3989     

 

 [facility]  [id number] [place] [address] 83840-
7763  23 Oct, 2017  Lumbar neuritis M54.16 and ADHD (attention deficit 
hyperactivity disorder), inattentive type F90.0

 

 [facility]  [id number] [place] [address] 71838-
0832  12 Oct, 2017   

 

 [facility]  [id number] [place] [address] 83828-
8856  26 Sep, 2017  Lumbar neuritis M54.16 and ADHD (attention deficit 
hyperactivity disorder), inattentive type F90.0

 

 [facility]  [id number] [place] [address] 18349-
5304  19 Sep, 2017   

 

 [facility]  [id number] [place] [address] 35753-
8354  31 Aug, 2017  ADHD (attention deficit hyperactivity disorder), 
inattentive type F90.0 and Lumbar neuritis M54.16

 

 [facility]  [id number] [place] [address] 13970-
2022  24 Aug, 2017  Lumbar neuritis M54.16

 

 [facility]  [id number] [place] [address] 79314-
1637  23 Aug, 2017   

 

 [facility]  [id number] [place] [address] 86521-
1896  02 Aug, 2017  ADHD (attention deficit hyperactivity disorder), 
inattentive type F90.0 and Lumbar neuritis M54.16

 

 [facility]  [address] [address]-
5718  02 Aug, 2017   

 

 [facility]  [id number] [address]-
7414     

 

 [facility]  [id number] [place] [address] 94842-
6850     

 

 [facility]  [id number] [place] [address] 11778-
7774     

 

 [facility]  [id number] [place] [address] 48108-
1838     

 

 [facility]  [id number] [place] [address] 89833-
0798    ADHD (attention deficit hyperactivity disorder), 
inattentive type F90.0 and Lumbar neuritis M54.16

 

 [facility]  [address] [address]-
3878     

 

 [facility]  [id number] [place] [address] 11057-
8398     

 

 [facility]  [address] [place] [address] 05626-
8398    Lumbar neuritis M54.16 and ADHD (attention deficit 
hyperactivity disorder), inattentive type F90.0

 

 [facility]  [address] [address]-
8421     

 

 [facility]  [id number] [place] [address] 35177-
9997  10 May, 2017  Lumbar neuritis M54.16 and ADHD (attention deficit 
hyperactivity disorder), inattentive type F90.0

 

 [facility]  [address] [address]-
1185  03 May, 2017   

 

 [facility]  [id number] [place] [address] 25244-
0266  01 May, 2017   

 

 [facility]  [id number] [place] [address] 64897-
0937    Narcolepsy due to underlying condition without cataplexy 
G47.429 and Lumbago with sciatica, left side M54.42

 

 [facility]  [id number] [place] [address] 12650-
3819    Lumbar neuritis M54.16 and ADHD (attention deficit 
hyperactivity disorder), inattentive type F90.0

 

 [facility]  [id number] [place] [address] 65469-
5037  31 Mar, 2017   

 

 [facility]  [id number] [place] [address] 68806-
5978  15 Mar, 2017  Lumbar neuritis M54.16 and ADHD (attention deficit 
hyperactivity disorder), inattentive type F90.0

 

 [facility]  [id number] [place] [address] 20623-
9614  15 Mar, 2017   

 

 [facility]  [id number] [place] [address] 52381-
5394  06 Mar, 2017   

 

 [facility]  [id number] [place] [address] 17753-
7696  15 Feb, 2017  ADHD (attention deficit hyperactivity disorder), 
inattentive type F90.0

 

 [facility]  [id number] [place] [address] 23320-
9257  15 Feb, 2017  Lumbar neuritis M54.16

 

 [facility]  [id number] [place] [address] 26538-
7091     

 

 [facility]  [id number] [place] [address] 20183-
7357     

 

 [facility]  [id number] [place] [address] 90950-
9552    Attention deficit hyperactivity disorder (ADHD), 
predominantly inattentive type F90.0

 

 [facility]  [id number] [address]-
9564     

 

 [facility]  [id number] [place] [address] 08565-
8011  10 Niall, 2017   

 

 [facility]  [id number] [place] [address] 08348-
5316     

 

 [facility]  [id number] [place] [address] 13298-
7071  22 Dec, 2016  Attention deficit hyperactivity disorder (ADHD), 
predominantly inattentive type F90.0

 

 [facility]  [id number] [place] [address] 94575-
8215  12 Dec, 2016   

 

 [facility]  [id number] [place] [address] 69997-
8527  07 Dec, 2016   

 

 [facility]  [id number] [place] [address] 16151-
3918  05 Dec, 2016   

 

 [facility]  [id number] [place] [address] 17396-
8389  05 Dec, 2016  Low TSH level R94.6

 

 [facility]  [id number] [place] [address] 29429-
8311  05 Dec, 2016   

 

 [facility]  [id number] [place] [address] 05223-
8209  02 Dec, 2016   

 

 [facility]  [address] [place] [address] 75145-
3933  10 Nov, 2016   

 

 [facility]  [id number] [place] [address] 87193-
1928  10 Nov, 2016   

 

 [facility]  [address] [place] [address] 40154-
4117     

 

 [facility]  [id number] [place] [address] 21691-
1186  18 Oct, 2016  Low TSH level R94.6

 

 [facility]  [address] [place] [address] 46964-
7795  17 Oct, 2016  Excessive daytime sleepiness G47.19 and ADHD (attention 
deficit hyperactivity disorder), inattentive type F90.0

 

 [facility]  [id number] [place] [address] 87277-
3161  13 Oct, 2016   

 

 [facility]  [id number] [place] [address] 40303-
6923  12 Oct, 2016   

 

 [facility]  [id number] [place] [address] 99041-
8765  03 Oct, 2016   

 

 [facility]  [id number] [place] [address] 50431-
4482  28 Sep, 2016   

 

 [facility]  [id number] [place] [address] 35877-
8566  14 Sep, 2016   

 

 [facility]  [id number] [place] [address] 97371-
5757  01 Sep, 2016   

 

 [facility]  [id number] [place] [address] 22613-
1023  17 Aug, 2016   

 

 [facility]  [id number] [place] [address] 44610-
4093  04 Aug, 2016   

 

 [facility]  [id number] [place] [address] 88000-
0287  03 Aug, 2016   

 

 [facility]  [address] [place] [address] 16140-
7689    Lumbar neuritis M54.16

 

 [facility]  [id number] [place] [address] 20366-
2399     

 

 [facility]  [id number] [place] [address] 43981-
3925     

 

 [facility]  [id number] [place] [address] 68271-
9321    Lumbar neuritis M54.16

 

 [facility]  [id number] [place] [address] 77022-
6152     

 

 [facility]  [id number] [place] [address] 27851-
8190     

 

 [facility]  [address] [address]-
1558  26 May, 2016  Lumbar neuritis M54.16

 

 [facility]  [address] [address]-
2373  25 May, 2016   

 

 [facility]  [id number] [place] [address] 47142-
9950  10 May, 2016   

 

 [facility]  [address] [place] [address] 86576-
0683    Lumbar neuritis M54.16

 

 [facility]  [id number] [place] [address] 34794-
8234     

 

 [facility]  [address] [place] [address] 04549-
8821     

 

 [facility]  [address] [place] [address] 15506-
5390  23 Mar, 2016   

 

 [facility]  [address] [address]-
3280  14 Mar, 2016   

 

 [facility]  [address] [address]-
6601  14 Mar, 2016   

 

 [facility]  [address] [address]-
1965  11 Mar, 2016   

 

 [facility]  [address] [address]-
0690  24 2016   

 

 [facility]  [address] [address]-
4329    Inguinal hernia, right K40.90

 

 [facility]  [address] [address]-
8377  17 2016   

 

 [facility]  [address], [place] [address]-
6552  15 2016  Low back pain M54.5 and Sciatica, unspecified side M54.30

 

 [facility]  [address] [facility] [id number] [address]-
2563     

 

 [facility]  [address] [facility] [id number] [address]-
5207     

 

 [facility]  [address] [facility] [id number] [address]-
5280  30 Dec, 2015   

 

 [facility]  [address] [facility] [id number] [place] [address] 39857-
2996  28 Dec, 2015   

 

 [facility]  [address] [facility] [id number] [place] [address]-
3828  02 Dec, 2015   

 

 [facility]  [id number] [place] [address] 39312-
5231     

 

 [facility]  [address] [address]-
7400     

 

 [facility]  [address] [place] [address] 71931-
6216     

 

 [facility]  [id number] [address]-
1917     

 

 [facility]  [address] [address]-
0685  26 Oct, 2015   

 

 [facility]  [address], [place] [address]-
7878  22 Oct, 2015  Encounter for immunization Z23

 

 [facility]  [address] [facility] [id number] [address]-
5585  07 Oct, 2015   

 

 [facility]  [address] [facility] [id number] [address]-
6799  05 Oct, 2015   

 

 [facility]  [id number] [address]-
8714  09 Sep, 2015   

 

 [facility]  [id number] [address]-
2441  08 Sep, 2015   

 

 [facility]  [address] [address]-
6825  19 Aug, 2015  Lumbago 724.2

 

 [facility] [place] [facility]  [address] [id number] [place]
2067  12 Aug, 2015   

 

 [facility] [place] [facility]  [address] [id number] [place] [address]-
7940    Lumbago 724.2

 

 [facility] [place] [facility]  [address] [id number] [place] [address]-
3389     

 

 [facility] [place] [facility]  [address] [id number] [place] [address]-
6736     

 

 [facility] [place] [facility]  [address] [id number] [place] [address]-
0021     

 

 [facility] [place] [facility]  [address] [id number] [place] [address]-
9064     

 

 [facility] [place] [facility]  [address] [id number] [place] [address]-
0103     

 

 [facility] [place] [facility]  [address] [id number] [place] [address]-
9364  22 May, 2015   

 

 [facility] [place] [facility]  [address] [id number] [place] [address]-
6145     

 

 [facility] [place] [facility]  [address] [id number] [place] [address]-
6934     

 

 [facility] [place] [facility]  [address] [id number] [place] [address]-
2357  30 Mar, 2015   

 

 [facility] [place] [facility]  [address] [id number] [place] [address]-
6721  30 Mar, 2015   

 

 [facility] [place] [facility]  [address] [id number] [place] [address]-
0506  02 Mar, 2015   

 

 [facility] [place] [facility]  [address] [id number] [place] [address]-
2208  02 Mar, 2015   

 

 [facility] [place] [facility]  [address] [id number] [place] [address]-
9828  ,    

 

 [facility] [place] [facility]  [address] [id number] [place] [address]-
2145  ,    

 

 [facility] [place] [facility]  [address] [id number] [place] [address]-
3086     

 

 [facility] [place] [facility]  [address] [id number] [place] [address]-
8396     

 

 [facility] [place] [facility]  [address] [id number] [place] [address]-
2322     

 

 [facility] [place] [facility]  [address] [id number] [place] [address]-
8842     

 

 [facility] [place] [facility]  [address] [id number] [place] [address]-
4130  31 Dec, 2014   

 

 [facility] [place] [facility]  [address] [id number] [place] [address]-
2821  31 Dec, 2014   

 

 [facility] [place] [facility]  [address] [id number] [place] [address]-
8212  22 Dec, 2014   

 

 [facility] [place] [facility]  [address] [id number] [place] [address]-
3006  22 Dec, 2014   

 

 [facility] [place] [facility]  [address] [facility] [id number] [place] [address]-
9759  08 Dec, 2014   

 

 [facility] [place] [facility]  [address] [id number] [place] [address]-
2980  08 Dec, 2014   

 

 [facility] [place] [facility]  [address] [id number] [place] [address]-
5736  10 Nov, 2014   

 

 [facility] [place] [facility]  [address] [id number] [place]
8782  10 Nov, 2014   

 

 [facility] [place] [facility]  [address] [id number] [place]-
0117  13 Oct, 2014   

 

 [facility] [place] [facility]  [address] [id number] [place]
6747  13 Oct, 2014   

 

 [facility] [place] [facility]  [address] [id number] [place] [address]-
6820  01 Oct, 2014   

 

 [facility] [place] [facility]  [address] [id number] [place]
1038  01 Oct, 2014   

 

 [facility] [place] [facility]  [address] [id number] [place] [address]-
7294  15 Sep, 2014   

 

 [facility] [place] [facility]  [address] [id number] [place] [address]-
2848  15 Sep, 2014   

 

 [facility] [place] [facility]  [address] [id number] [place] [address]-
2624  18 Aug, 2014   

 

 [facility] [place] [facility]  [address] [id number] [place] [address]-
0613  18 Aug, 2014   

 

 [facility] [place] [facility]  [address] [id number] [place] [address]-
8103  18 Aug, 2014   

 

 [facility] [place] [facility]  [address] [id number] [place] [address]-
8397  18 Aug, 2014   

 

 [facility] [place] [facility]  [address] [id number] [place] [address]-
4345     

 

 [facility] [place] [facility]  [address] [id number] [place] [address]-
3447     

 

 [facility] [place] [facility]  [address] [id number] [place] [address]-
5744     

 

 [facility] [place] [facility]  [address] [id number] [place] [address]-
3236     

 

 [facility] [place] [facility]  [address] [id number] [place] [address]-
6752     

 

 [facility] [place] [facility]  [address] [id number] [place] [address]-
0647     

 

 [facility] [place] [facility]  [address] [id number] [place] [address]-
8374  30 May, 2014   

 

 [facility] [place] [facility]  [address] [id number] [place] [address]-
0809  28 May, 2014   

 

 [facility] [place] [facility]  [address] [id number] [place] [address]-
6670  28 May, 2014   

 

 [facility] [place] [facility]  [address] [id number] [place] [address]-
6587     

 

 [facility] [place] [facility]  [address] [id number] [place] [address]-
1454     

 

 [facility] [place] [facility]  [address] [id number] [place] [address]-
7801     

 

 [facility] [place] [facility]  [address] [id number] [place] [address]-
4291     

 

 [facility] [place] [facility]  [address] [id number] [place] [address]-
8307     

 

 [facility] [place] [facility]  [address] [id number] [place] [address]-
9244     

 

 [facility] [place] [facility]  [address] [id number] [place] [address]-
7502     

 

 [facility] [place] [facility]  [address] [id number] [place] [address]-
7588     

 

 [facility] [place] [facility]  [address] [id number] [place] [address]-
6494     

 

 [facility] [place] [facility]  [address] [id number] [place] [address]-
[address]     

 

 [facility] [place] [facility]  [address] [id number] [place] [address]-
[address]     

 

 [facility] [place] [facility]  [address] [id number] [place] [address]  28 Mar, 2014   

 

 [facility] [place] [facility]  [address] [id number] [place] [address]  27 Mar, 2014   

 

 [facility] [place] [facility]  [address] [id number] [place] [address]-
[address]  27 Mar, 2014   

 

 [facility] [place] [facility]  [address] [id number] [place] [address]-
[address]  26 Mar, 2014   

 

 [facility] [place] [facility]  [address] [id number] [place] [address]  26 Mar, 2014   

 

 [facility] [place] [facility]  [address] [id number] [place] [address]     

 

 [facility] [place] [facility]  [address] [id number] [place] [address]-
1517     

 

 [facility] [place] [facility]  [address] [id number] [place] [address]-
3585     

 

 [facility] [place] [facility]  [address] [id number] [place] [address]-
8606     

 

 [facility] [place] [facility]  [address] [id number] [place] [address]-
7421     

 

 [facility] [place] [facility]  [address] [id number] [place] [address]-
1845     

 

 [facility] [place] [facility]  [address] [id number] [place] [address]-
1442     

 

 [facility] [place] [facility]  [address] [id number] [place] [address]-
3947     

 

 [facility] [place] [facility]  [address] [id number] [place] [address]-
7755     

 

 [facility] [place] [facility]  [address] [id number] [place] [address]-
[address]80     

 

 [facility] [place] [facility]  [address] [id number] [place] [address]-
5214     

 

 [facility] [place] [facility]  [address] [id number] [place] [address]-
2202     

 

 [facility] [place] [facility]  [address] [id number] [place] [address]-
6024     

 

 [facility] [place] [facility]  [address] [id number] [place] [address]-
7706  10 Dec, 2013   

 

 [facility]  [address] [facility] [id number] [place] [address]-
0326  10 Dec, 2013   

 

 [facility]  [address] [facility] [id number] [address]
9736     

 

 [facility]  [address] [facility] [id number][place] [address]-
6986     







IMMUNIZATIONS

No Known Immunizations



SOCIAL HISTORY

Never Assessed



REASON FOR VISIT

Xray Order



PLAN OF CARE





VITAL SIGNS





MEDICATIONS

Unknown Medications



RESULTS

No Results



PROCEDURES

No Known procedures



INSTRUCTIONS





MEDICATIONS ADMINISTERED

No Known Medications



MEDICAL (GENERAL) HISTORY







 Type  Description  Date

 

 Medical History  narcolepsy   

 

 Surgical History  Gallbladder removed  2015

 

 Surgical History  Hernia repair  2016

## 2018-09-02 NOTE — XMS REPORT
[facility]

 Created on: 10/05/2015



Sofiya Singh

External Reference #: [id number]

: 1955

Sex: Female



Demographics







 Address  [address] [place]  [address]

 

 Home Phone  [phone]

 

 Preferred Language  Unknown

 

 Marital Status  Unknown

 

 Buddhist Affiliation  Unknown

 

 Race  White

 

 Ethnic Group  Not  or 





Author







 Author  NAHUN SNYDER

 

 Organization  [employer]

 

 Address  Unknown

 

 Phone  Unavailable







Care Team Providers







 Care Team Member Name  Role  Phone

 

 NAHUN SNYDER  CP  Unavailable



                                                                



Allergies

          No Known Allergies                                                   
                                     



Problems

          





 Problem Type  Condition  Code  Onset Dates  Condition Status

 

 Problem  Screening examination for pulmonary tuberculosis  V74.1     Active

 

 Problem  Pain in joint, pelvic region and thigh  719.45     Active

 

 Problem  Pneumonia, organism unspecified  486     Active

 

 Problem  Family history of ischemic heart disease  V17.3     Active

 

 Problem  Family history of other cardiovascular diseases  V17.49     Active

 

 Problem  Unspecified arthropathy, site unspecified  716.90     Active

 

 Problem  Anxiety state, unspecified  300.00     Active

 

 Problem  Lumbago  724.2     Active

 

 Problem  Family history of malignant neoplasm of breast  V16.3     Active

 

 Problem  Family history of diabetes mellitus  V18.0     Active



                                                                               
                                                                               
                    



Medications

          No Known Medications                                                 
                             



Results

          No Known Results                                                     
               



Summary Purpose

          [employer] Submission

## 2018-09-02 NOTE — XMS REPORT
[facility]

 Created on: 2017



Sofiya Singh

External Reference #: [id number]

: 1955

Sex: Female



Demographics







 Address  [address] [place]  [address]

 

 Preferred Language  Unknown

 

 Marital Status  Unknown

 

 Amish Affiliation  Unknown

 

 Race  Unknown

 

 Ethnic Group  Unknown





Author







 Author  NAHUN SNYDER

 

 [facility]

 

 Address  [address] [place]  [address]



 

 Phone  [phone]







Care Team Providers







 Care Team Member Name  Role  Phone

 

 NAHUN SNYDER  Unavailable  [phone]







PROBLEMS







 Type  Condition  ICD9-CM Code  CKL19-FG Code  Onset Dates  Condition Status  
SNOMED Code

 

 Problem  Lumbago with sciatica, left side     M54.42     Active  698251869

 

 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 
    Active  [id number]

 

 Problem  ADHD (attention deficit hyperactivity disorder), inattentive type     
F90.0     Active  14058566

 

 Problem  Excessive daytime sleepiness     G47.19     Active  [id number]







ALLERGIES

No Information



SOCIAL HISTORY

Never Assessed



PLAN OF CARE





VITAL SIGNS





MEDICATIONS







 Medication  Instructions  Dosage  Frequency  Start Date  End Date  Duration  
Status

 

 Tramadol HCl 50 mg  Orally every 6 hrs  1 tablet as needed  6h  02 Dec, 2016  
   28 days  Active







RESULTS

No Results



PROCEDURES

No Known procedures



IMMUNIZATIONS

No Known Immunizations



MEDICAL (GENERAL) HISTORY







 Type  Description  Date

 

 Surgical History  Gallbladder removed  2015

 

 Surgical History  Hernia repair  2016

## 2018-09-02 NOTE — XMS REPORT
[facility]

 Created on: 11/10/2017



Sofiya Singh

External Reference #: [id number]

: 1955

Sex: Female



Demographics







 Address  [address] [place]  [address]

 

 Preferred Language  Unknown

 

 Marital Status  Unknown

 

 Adventist Affiliation  Unknown

 

 Race  Unknown

 

 Ethnic Group  Unknown





Author







 Author  NAHUN SNYDER

 

 Organization  [facility]

 

 Address  [address] [place]  [address]



 

 Phone  [phone]







Care Team Providers







 Care Team Member Name  Role  Phone

 

 NAHUN SNYDER  Unavailable  [phone]







PROBLEMS







 Type  Condition  ICD9-CM Code  JXT28-SC Code  Onset Dates  Condition Status  
SNOMED Code

 

 Problem  Lumbago with sciatica, left side     M54.42     Active  359654013

 

 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 
    Active  [id number]

 

 Problem  ADHD (attention deficit hyperactivity disorder), inattentive type     
F90.0     Active  84710105

 

 Problem  Excessive daytime sleepiness     G47.19     Active  [id number]







ALLERGIES

No Information



SOCIAL HISTORY

Never Assessed



PLAN OF CARE





VITAL SIGNS





MEDICATIONS







 Medication  Instructions  Dosage  Frequency  Start Date  End Date  Duration  
Status

 

 Hydrocodone-Acetaminophen  MG  Orally every 6 hrs  1 tablet as needed  
6h       28 days  Active

 

 Alprazolam 1 MG  Orally Three times a day  1 tablet  8h  30 Mar, 2015     28 
days  Active







RESULTS

No Results



PROCEDURES

No Known procedures



IMMUNIZATIONS

No Known Immunizations



MEDICAL (GENERAL) HISTORY







 Type  Description  Date

 

 Surgical History  Gallbladder removed  2015

 

 Surgical History  Hernia repair  2016

## 2018-09-02 NOTE — XMS REPORT
[facility]

 Created on: 2015



Samantha Danykath

External Reference #: [id number]

: 1955

Sex: Female



Demographics







 Address  [address] [place]  [address]

 

 Home Phone  [phone]

 

 Preferred Language  Unknown

 

 Marital Status  Unknown

 

 Oriental orthodox Affiliation  Unknown

 

 Race  White

 

 Ethnic Group  Not  or 





Author







 Author  NAHUN SNYDER

 

 Organization  [employer]

 

 Address  Unknown

 

 Phone  Unavailable







Care Team Providers







 Care Team Member Name  Role  Phone

 

 NAHUN SNYDER  CP  Unavailable



                                                                



Allergies

          No Known Allergies                                                   
                                     



Problems

          





 Problem Type  Condition  Code  Onset Dates  Condition Status

 

 Problem  Screening examination for pulmonary tuberculosis  V74.1     Active

 

 Problem  Pain in joint, pelvic region and thigh  719.45     Active

 

 Problem  Pneumonia, organism unspecified  486     Active

 

 Problem  Family history of ischemic heart disease  V17.3     Active

 

 Problem  Family history of other cardiovascular diseases  V17.49     Active

 

 Problem  Unspecified arthropathy, site unspecified  716.90     Active

 

 Problem  Anxiety state, unspecified  300.00     Active

 

 Problem  Lumbago  724.2     Active

 

 Problem  Family history of malignant neoplasm of breast  V16.3     Active

 

 Problem  Family history of diabetes mellitus  V18.0     Active



                                                                               
                                                                               
                    



Medications

          





 Medication  Code System  Code  Instructions  Start Date  End Date  Status  
Dosage

 

 Hydrocodone-Acetaminophen  NDC  [address]-41   MG    2015    
    take 1 tablet by oral route every 6 hours as needed for pain PRN

 

 tramadol  NDC  0  50 mg    2015        take 1 tablet (50 mg) by oral 
route every 6 hours as needed

 

 Alprazolam  NDC  [address]-10  1 MG    2015        take 1 tablet (1 
mg) by oral route 3 times per day



                                                                               
                   



Results

          No Known Results                                                     
               



Summary Purpose

          [employer] Submission

## 2018-09-02 NOTE — XMS REPORT
[facility]

 Created on: 2017



Sofiya Singh

External Reference #: [id number]

: 1955

Sex: Female



Demographics







 Address  [address] [place]  [address]

 

 Preferred Language  Unknown

 

 Marital Status  Unknown

 

 Presybeterian Affiliation  Unknown

 

 Race  Unknown

 

 Ethnic Group  Unknown





Author







 Author  NAHUN SNYDER

 

 [facility]

 

 Address  [address] [place]  [address]



 

 Phone  [phone]







Care Team Providers







 Care Team Member Name  Role  Phone

 

 NAHUN SNYDER  Unavailable  [phone]







PROBLEMS







 Type  Condition  ICD9-CM Code  JDB97-NK Code  Onset Dates  Condition Status  
SNOMED Code

 

 Problem  Lumbago with sciatica, left side     M54.42     Active  675387705

 

 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 
    Active  [id number]

 

 Problem  ADHD (attention deficit hyperactivity disorder), inattentive type     
F90.0     Active  29978907

 

 Problem  Excessive daytime sleepiness     G47.19     Active  995758522915







ALLERGIES

No Information



SOCIAL HISTORY

Never Assessed



PLAN OF CARE





VITAL SIGNS





MEDICATIONS







 Medication  Instructions  Dosage  Frequency  Start Date  End Date  Duration  
Status

 

 Adderall XR 20 mg  Orally Once a day  1 capsule in the morning  24h       28 days  Active







RESULTS

No Results



PROCEDURES

No Known procedures



IMMUNIZATIONS

No Known Immunizations



MEDICAL (GENERAL) HISTORY







 Type  Description  Date

 

 Surgical History  Gallbladder removed  2015

 

 Surgical History  Hernia repair  2016

## 2018-09-02 NOTE — XMS REPORT
[facility]

 Created on: 2018



Sofiya Singh

External Reference #: [id number]

: 1955

Sex: Female



Demographics







 Address  [address] [place]  [address]

 

 Preferred Language  Unknown

 

 Marital Status  Unknown

 

 Religion Affiliation  Unknown

 

 Race  Unknown

 

 Ethnic Group  Unknown





Author







 Author  NHAUN SNYDER

 

 Organization  [facility]

 

 Address  [address] [place]  [address]



 

 Phone  [phone]







Care Team Providers







 Care Team Member Name  Role  Phone

 

 NAHUN SNYDER  Unavailable  [phone]







PROBLEMS







 Type  Condition  ICD9-CM Code  OEV63-UI Code  Onset Dates  Condition Status  
SNOMED Code

 

 Problem  Excessive daytime sleepiness     G47.19     Active  231035492141

 

 Problem  Lumbago with sciatica, left side     M54.42     Active  592836982

 

 Problem  ADHD (attention deficit hyperactivity disorder), inattentive type     
F90.0     Active  83160803

 

 Problem  Other chronic pain     G89.29     Active  74277572

 

 Problem  Other atopic dermatitis     L20.89     Active  14515883

 

 Problem  Primary narcolepsy without cataplexy     G47.419     Active  
[id number]

 

 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 
    Active  [id number]

 

 Problem  Arthritis     M19.90     Active  8153177

 

 Problem  Positive skin test for tuberculosis     R76.11     Active  401642541







ALLERGIES







 Substance  Reaction  Event Type  Date  Status

 

 Penicillins  Unknown  Non Drug Allergy  14 Mar, 2018  Active







ENCOUNTERS







 Encounter  Location  Date  Diagnosis

 

 [place] [id number] [place] [address] 33581-
7580    Other chronic pain G89.29 ; Right lower quadrant pain 
R10.31 and Other atopic dermatitis L20.89

 

 [facility]  [id number] [place] [address] 51221-
5421    Right lower quadrant abdominal pain R10.31 and Rash R21

 

 [place] [address] [address]-
8108  14 Mar, 2018  ADHD (attention deficit hyperactivity disorder), 
inattentive type F90.0 ; Lumbago with sciatica, left side M54.42 and Arthritis 
M19.90

 

 [facility]  [id number] [place] [address] 87589-
1632    ADHD (attention deficit hyperactivity disorder), 
inattentive type F90.0 and Lumbar neuritis M54.16

 

 [facility]  [id number] [place] [address] 54715-
1167  16 2018   

 

 [facility]  [id number] [place] [address] 62064-
4170    Lumbar neuritis M54.16

 

 [facility]  [id number] [place] [address] 00165-
4040    Low back pain M54.5

 

 [facility]  [id number] [place] [address] 20610-
7418    ADHD (attention deficit hyperactivity disorder), 
inattentive type F90.0

 

 [facility]  [id number] [place] [address] 22231-
8327    Positive skin test for tuberculosis R76.11

 

 [facility]  [id number] [place] [address] 11688-
4230    Positive skin test for tuberculosis R76.11

 

 [facility]  [id number] [place] [address] 55526-
4941     

 

 [facility]  [id number] [place] [address] 19184-
4997    Lumbar neuritis M54.16

 

 [facility]  [id number] [place] [address] 68316-
0774    ADHD (attention deficit hyperactivity disorder), 
inattentive type F90.0

 

 [facility]  [id number] [place] [address] 30658-
9788     

 

 [facility]  [id number] [place] [address] 87240-
2028  20 Dec, 2017  Lumbar neuritis M54.16

 

 [facility]  [id number] [place] [address] 61591-
5374  15 Dec, 2017  ADHD (attention deficit hyperactivity disorder), 
inattentive type F90.0

 

 [facility]  [id number] [place] [address] 25190-
6620  12 Dec, 2017   

 

 [facility]  [id number] [place] [address] 20211-
8641  11 Dec, 2017   

 

 [facility]  [id number] [place] [address] 99487-
9367    Lumbar neuritis M54.16

 

 [facility]  [id number] [place] [address] 99841-
5533    ADHD (attention deficit hyperactivity disorder), 
inattentive type F90.0 and Primary narcolepsy without cataplexy G47.419

 

 [facility]  [id number] [place] [address] 33980-
9618  10 Nov, 2017   

 

 [facility]  [id number] [place] [address] 67844-
9531     

 

 [facility]  [id number] [place] [address] 84966-
2022  23 Oct, 2017  Lumbar neuritis M54.16 and ADHD (attention deficit 
hyperactivity disorder), inattentive type F90.0

 

 [facility]  [id number] [place] [address] 86448-
4188  12 Oct, 2017   

 

 [facility]  [id number] [place] [address] 51735-
8730  26 Sep, 2017  Lumbar neuritis M54.16 and ADHD (attention deficit 
hyperactivity disorder), inattentive type F90.0

 

 [facility]  [id number] [place] [address] 75761-
3074  19 Sep, 2017   

 

 [facility]  [id number] [place] [address] 64226-
4013  31 Aug, 2017  ADHD (attention deficit hyperactivity disorder), 
inattentive type F90.0 and Lumbar neuritis M54.16

 

 [facility]  [id number] [place] [address] 94689-
3377  24 Aug, 2017  Lumbar neuritis M54.16

 

 [facility]  [id number] [place] [address] 62360-
3026  23 Aug, 2017   

 

 [facility]  [id number] [place] [address] 95417-
9886  02 Aug, 2017  ADHD (attention deficit hyperactivity disorder), 
inattentive type F90.0 and Lumbar neuritis M54.16

 

 [facility]  [id number] [place] [address] 45725-
2596  02 Aug, 2017   

 

 [facility]  [id number] [place] [address] 54732-
5513     

 

 [facility]  [id number] [place] [address] 76565-
7055     

 

 [facility]  [id number] [place] [address] 55938-
5259     

 

 [facility]  [id number] [place] [address] 30039-
9066     

 

 [facility]  [id number] [place] [address] 87870-
9628    ADHD (attention deficit hyperactivity disorder), 
inattentive type F90.0 and Lumbar neuritis M54.16

 

 [facility]  [id number] [place] [address] 83935-
2294     

 

 [facility]  [id number] [place] [address] 29319-
2161     

 

 [facility]  [address] [place] [address] 43589-
0214    Lumbar neuritis M54.16 and ADHD (attention deficit 
hyperactivity disorder), inattentive type F90.0

 

 [facility]  [address] [place] [address] 18167080-
4207     

 

 [facility]  [address] [address]-
1791  10 May, 2017  Lumbar neuritis M54.16 and ADHD (attention deficit 
hyperactivity disorder), inattentive type F90.0

 

 [facility]  [id number] [place] [address] 76824-
1839  03 May, 2017   

 

 [facility]  [id number] [place] [address] 11637-
4657  01 May, 2017   

 

 [facility]  [id number] [place] [address] 27183-
1645    Narcolepsy due to underlying condition without cataplexy 
G47.429 and Lumbago with sciatica, left side M54.42

 

 [facility]  [id number] [place] [address] 58494-
0844    Lumbar neuritis M54.16 and ADHD (attention deficit 
hyperactivity disorder), inattentive type F90.0

 

 [facility]  [id number] [place] [address] 61532-
8261  31 Mar, 2017   

 

 [facility]  [id number] [place] [address] 14122-
6383  15 Mar, 2017  Lumbar neuritis M54.16 and ADHD (attention deficit 
hyperactivity disorder), inattentive type F90.0

 

 [facility]  [id number] [place] [address] 12886-
8914  15 Mar, 2017   

 

 [facility]  [id number] [place] [address] 35301-
9370  06 Mar, 2017   

 

 [facility]  [id number] [place] [address] 00269-
3165  15 Feb, 2017  ADHD (attention deficit hyperactivity disorder), 
inattentive type F90.0

 

 [facility]  [id number] [place] [address] 02833-
6370  15 Feb, 2017  Lumbar neuritis M54.16

 

 [facility]  [id number] [place] [address] 62126-
2231  08 2017   

 

 [facility]  [id number] [place] [address] 71267-
5739     

 

 [facility]  [address] [address]-
9160    Attention deficit hyperactivity disorder (ADHD), 
predominantly inattentive type F90.0

 

 [facility]  [address] [address]-
7945     

 

 [facility]  [address] [address]-
1787  10 Niall, 2017   

 

 [facility]  [address] [address]-
3557     

 

 [facility]  [address] [place] [address] 41723-
9865  22 Dec, 2016  Attention deficit hyperactivity disorder (ADHD), 
predominantly inattentive type F90.0

 

 [facility]  [address] [address]-
9624  12 Dec, 2016   

 

 [facility]  [address] [address]-
6394  07 Dec, 2016   

 

 [facility]  [address] [address]-
7040  05 Dec, 2016   

 

 [facility]  [address] [address]-
5727  05 Dec, 2016   

 

 [facility]  [address] [address]-
9572  05 Dec, 2016  Low TSH level R94.6

 

 [facility]  [address] [address]-
0297  02 Dec, 2016   

 

 [facility]  [address] [address]-
8138  10 Nov, 2016   

 

 [facility]  [address] [address]-
3857  10 Nov, 2016   

 

 [facility]  [address] [address]-
7714     

 

 [facility]  [id number] [place] [address] 36615-
8124  18 Oct, 2016  Low TSH level R94.6

 

 [facility]  [id number] [place] [address] 55028-
3053  17 Oct, 2016  Excessive daytime sleepiness G47.19 and ADHD (attention 
deficit hyperactivity disorder), inattentive type F90.0

 

 [facility]  [id number] [place] [address] 89900-
6978  13 Oct, 2016   

 

 [facility]  [id number] [place] [address] 40317-
1947  12 Oct, 2016   

 

 [facility]  [id number] [place] [address] 45460-
5943  03 Oct, 2016   

 

 [facility]  [id number] [place] [address] 48374-
1980  28 Sep, 2016   

 

 [facility]  [id number] [place] [address] 12784-
5140  14 Sep, 2016   

 

 [facility]  [id number] [place] [address] 19698-
7015  01 Sep, 2016   

 

 [facility]  [id number] [place] [address] 68312-
4213  17 Aug, 2016   

 

 [facility]  [id number] [place] [address] 75737-
3460  04 Aug, 2016   

 

 [facility]  [id number] [place] [address] 69141-
0992  03 Aug, 2016   

 

 [facility]  [id number] [place] [address] 27584-
3775    Lumbar neuritis M54.16

 

 [facility]  [id number] [place] [address] 58774-
2498     

 

 [facility]  [id number] [place] [address] 03970-
0619     

 

 [facility]  [id number] [place] [address] 78864-
2187    Lumbar neuritis M54.16

 

 [facility]  [address] [facility] [id number] [place] [address]-
6012     

 

 [facility]  [address] [facility] [id number] [place] [address]
2546     

 

 [facility]  [address] [facility] [id number] [place] [address], [place] [address]-
2445  26 May, 2016  Lumbar neuritis M54.16

 

 [facility]  [address] [facility] [id number] [place] [address]-
8818  25 May, 2016   

 

 [facility]  [address] [facility] [id number] [place] [address], [place] [address]-
7601  10 May, 2016   

 

 [facility]  [address] [facility] [id number] [place] [address], [place] [address]-
5683    Lumbar neuritis M54.16

 

 [facility]  [id number] [place] [address], [place] [address]-
1564     

 

 [facility]  [address] [facility] [id number] [place] [address]-
7437     

 

 [facility]  [address] [facility] [id number] [place] [address]-
3379  23 Mar, 2016   

 

 [facility]  [id number] [place] [address]-
9909  14 Mar, 2016   

 

 [facility]  [address] [address]-
7219  14 Mar, 2016   

 

 [facility]  [address] [facility] [id number] [place] [address]-
0449  11 Mar, 2016   

 

 [facility]  [address] [facility] [id number] [place] [address]-
9645  24 2016   

 

 [facility]  [address] [facility] [id number] [place] [address]-
9879    Inguinal hernia, right K40.90

 

 [facility]  [address] [address]-
6853  17 2016   

 

 [facility]  [id number] [address]-
9690  15 Feb, 2016  Low back pain M54.5 and Sciatica, unspecified side M54.30

 

 [facility]  [address] [address]-
6748     

 

 [facility]  [id number] [place] [address] 25270-
7951     

 

 [facility]  [id number] [place] [address] 60464-
7378  30 Dec, 2015   

 

 [facility]  [id number] [place] [address] 35854-
5909  28 Dec, 2015   

 

 [facility]  [id number] [place] [address] 27534-
3881  02 Dec, 2015   

 

 [facility]  [id number] [place] [address] 99789-
2915     

 

 [facility]  [address] [place] [address] 74864-
8934     

 

 [facility]  [address] [place] [address] 62019-
7035     

 

 [facility]  [address] [address]-
7527     

 

 [facility]  [address] [place] [address] 76968-
9353  26 Oct, 2015   

 

 [facility]  [address] [address]-
7463  22 Oct, 2015  Encounter for immunization Z23

 

 [facility]  [id number] [place] [address] 75344-
5452  07 Oct, 2015   

 

 [facility]  [address] [address]-
8272  05 Oct, 2015   

 

 [facility]  [id number] [address]-
9252  09 Sep, 2015   

 

 [facility] [place] [facility]  [address] [id number] [place] [address]-
4697  08 Sep, 2015   

 

 [facility] [place] [facility]  [address] [id number] [place] [address]-
1659  19 Aug, 2015  Lumbago 724.2

 

 [facility] [place] [facility]  [address] [id number] [place] [address]-
9210  12 Aug, 2015   

 

 [facility] [place] [facility]  [address] [id number] [place] [address]-
8281    Lumbago 724.2

 

 [facility] [place] [facility]  [address] [id number] [place] [address]-
0324     

 

 [facility] [place] [facility]  [address] [id number] [place] [address]-
9946     

 

 [facility] [place] [facility]  [address] [id number] [place] [address]-
7567     

 

 [facility] [place] [facility]  [address] [id number] [place] [address]-
8878     

 

 [facility] [place] [facility]  [address] [id number] [place] [address]-
4421     

 

 [facility] [place] [facility]  [address] [id number] [place] [address]-
1615  22 May, 2015   

 

 [facility] [place] [facility]  [address] [id number] [place] [address]-
3481     

 

 [facility] [place] [facility]  [address] [id number] [place] [address]
2543     

 

 [facility] [place] [facility]  [address] [id number] [place] [address]-
2546  30 Mar, 2015   

 

 [facility] [place] [facility]  [address] [id number] [place] [address]-
4662  30 Mar, 2015   

 

 [facility] [place] [facility]  [address] [id number] [place] [address]-
1210  02 Mar, 2015   

 

 [facility] [place] [facility]  [address] [id number] [place] [address]-
4055  02 Mar, 2015   

 

 [facility] [place] [facility]  [address] [id number] [place]-
4578     

 

 [facility] [place] [facility]  [address] [id number] [place] [address]-
6566     

 

 [facility] [place] [facility]  [address] [id number] [place] [address]-
3685     

 

 [facility] [place] [facility]  [address] [id number] [place] [address]-
1639     

 

 [facility] [place] [facility]  [address] [id number] [place] [address]-
3242     

 

 [facility] [place] [facility]  [address] [id number] [place] [address]-
9993     

 

 [facility] [place] [facility]  [address] [id number] [place] [address]-
0977  31 Dec, 2014   

 

 [facility] [place] [facility]  [address] [id number] [place] [address]-
6442  31 Dec, 2014   

 

 [facility] [place] [facility]  [address] [id number] [place] [address]-
5674  22 Dec, 2014   

 

 [facility] [place] [facility]  [address] [id number] [place] [address]-
8395  22 Dec, 2014   

 

 [facility] [place] [facility]  [address] [id number] [place]
1792  08 Dec, 2014   

 

 [facility] [place] [facility]  [address] [id number] [place]
7601  08 Dec, 2014   

 

 [facility] [place] [facility]  [address] [id number] [place]
9948  10 Nov, 2014   

 

 [facility] [place] [facility]  [address] [id number] [place]-
8496  10 Nov, 2014   

 

 [facility] [place] [facility]  [address] [id number] [place] [address]-
4764  13 Oct, 2014   

 

 [facility] [place] [facility]  [address] [id number] [place] [address]-
4188  13 Oct, 2014   

 

 [facility] [place] [facility]  [address] [id number] [place] [address]-
1866  01 Oct, 2014   

 

 [facility] [place] [facility]  [address] [id number] [place] [address]-
1598  01 Oct, 2014   

 

 [facility] [place] [facility]  [address] [id number] [place] [address]-
2458  15 Sep, 2014   

 

 [facility] [place] [facility]  [address] [id number] [place] [address]-
5590  15 Sep, 2014   

 

 [facility] [place] [facility]  [address] [id number] [place] [address]-
9057  18 Aug, 2014   

 

 [facility] [place] [facility]  [address] [id number] [place] [address]-
4180  18 Aug, 2014   

 

 [facility] [place] [facility]  [address] [id number] [place] [address]-
5365  18 Aug, 2014   

 

 [facility] [place] [facility]  [address] [id number] [place] [address]-
2369  18 Aug, 2014   

 

 [facility] [place] [facility]  [address] [id number] [place] [address]-
4843     

 

 [facility] [place] [facility]  [address] [id number] [place] [address]-
7742     

 

 [facility] [place] [facility]  [address] [id number] [place] [address]-
7468     

 

 [facility] [place] [facility]  [address] [id number] [place] [address]-
2780     

 

 [facility] [place] [facility]  [address] [id number] [place] [address]-
5402     

 

 [facility] [place] [facility]  [address] [id number] [place] [address]-
6913     

 

 [facility] [place] [facility]  [address] [id number] [place] [address]-
2543  30 May, 2014   

 

 [facility] [place] [facility]  [address] [id number] [place] [address]-
4471  28 May, 2014   

 

 [facility] [place] [facility]  [address] [id number] [place] [address]-
6099  28 May, 2014   

 

 [facility] [place] [facility]  [address] [id number] [place] [address]-
1824     

 

 [facility] [place] [facility]  [address] [id number] [place] [address]-
9809     

 

 [facility] [place] [facility]  [address] [id number] [place] [address]-
9357     

 

 [facility] [place] [facility]  [address] [id number] [place] [address]-
[address]707     

 

 [facility] [place] [facility]  [address] [id number] [place] [address]-
3578     

 

 [facility] [place] [facility]  [address] [id number] [place] [address]-
9973     

 

 [facility] [place] [facility]  [address] [id number] [place] [address]-
[address]     

 

 [facility] [place] [facility]  [address] [id number] [place] [address]-
8832     

 

 [facility] [place] [facility]  [address] [id number] [place] [address]-
0065     

 

 [facility] [place] [facility]  [address] [id number] [place] [address]-
4972     

 

 [facility] [place] [facility]  [address] [id number] [place] [address]-
2562     

 

 [facility] [place] [facility]  [address] [id number] [place] [address]-
0685  28 Mar, 2014   

 

 [facility] [place] [facility]  [address] [id number] [place] [address]-
[address]  27 Mar, 2014   

 

 [facility] [place] [facility]  [address] [id number] [place] [address]-
[address]285  27 Mar, 2014   

 

 [facility] [place] [facility]  [address] [id number] [place] [address]-
[address]27  26 Mar, 2014   

 

 [facility] [place] [facility]  [address] [id number] [place] [address]-
7525  26 Mar, 2014   

 

 [facility] [place] [facility]  [address] [id number] [place] [address]-
8848     

 

 [facility] [place] [facility]  [address] [id number] [place] [address]-
3540     

 

 [facility] [place] [facility]  [address] [id number] [place] [address]-
1618     

 

 [facility] [place] [facility]  [address] [id number] [place] [address]-
1784     

 

 [facility] [place] [facility]  [address] [id number] [place] [address]-
1715     

 

 [facility] [place] [facility]  [address] [facility] [id number] [place] [address]-
8340     

 

 [facility] [place] [facility]  [address] [id number] [place] [address]-
5610     

 

 [facility] [place] [facility]  [address] [id number] [place] [address]-
8496     

 

 [facility] [place] [facility]  [address] [id number] [place] [address]-
7017     

 

 [facility] [place] [facility]  [address] [id number] [place] [address]-
4132     

 

 [facility] [place] [facility]  [address] [id number] [place] [address]-
2279     

 

 [facility] [place] [facility]  [address] [id number] [place] [address]-
2546     

 

 [facility]  [address] [facility] [id number] [place] [address]-
2546     

 

 [facility]  [address] [facility] [id number] [address]
2546  10 Dec, 2013   

 

 [facility]  [address] [facility] [id number] [address]-
2546  10 Dec, 2013   

 

 [facility]  [address] [facility] [id number] [address]-
2546     

 

 [facility]  [address] [facility] [id number] [address]-
2546     







IMMUNIZATIONS

No Known Immunizations



SOCIAL HISTORY

Never Assessed



REASON FOR VISIT

Pain management (chronic)-Galina GARZA



PLAN OF CARE





VITAL SIGNS







 Height  59 in  2018

 

 Weight  125.7 lbs  2018

 

 Temperature  98.1 degrees Fahrenheit  2018

 

 Heart Rate  74 bpm  2018

 

 Respiratory Rate  18   2018

 

 BMI  25.39 kg/m2  2018

 

 Blood pressure systolic  128 mmHg  2018

 

 Blood pressure diastolic  80 mmHg  2018







MEDICATIONS







 Medication  Instructions  Dosage  Frequency  Start Date  End Date  Duration  
Status

 

 Nabumetone 500 mg  Orally Twice a day  1 tablet  12h  14 Mar, 2018  13 May, 
2018  30 day(s)  Active







RESULTS

No Results



PROCEDURES







 Procedure  Date Ordered  Result  Body Site

 

 COMPREHEN METABOLIC PANEL  2018      

 

 ANTINUCLEAR ANTIBODIES  2018      

 

 RHEUMATOID FACTOR, QUANT  2018      

 

 ASSAY THYROID STIM HORMONE  2018      

 

 RBC SED RATE, AUTOMATED  2018      

 

 COMPLETE CBC W/AUTO DIFF WBC  2018      

 

 C-REACTIVE PROTEIN  2018      

 

 ANTISTREPTOLYSIN O, TITER  2018      







INSTRUCTIONS





MEDICATIONS ADMINISTERED

No Known Medications



MEDICAL (GENERAL) HISTORY







 Type  Description  Date

 

 Medical History  narcolepsy   

 

 Medical History  lupus   

 

 Surgical History  Gallbladder removed  2015

 

 Surgical History  Hernia repair  2016

## 2018-09-02 NOTE — XMS REPORT
[facility]

 Created on: 2018



Sofiya Singh

External Reference #: [id number]

: 1955

Sex: Female



Demographics







 Address  [address] [place]  [address]

 

 Preferred Language  Unknown

 

 Marital Status  Unknown

 

 Mosque Affiliation  Unknown

 

 Race  Unknown

 

 Ethnic Group  Unknown





Author







 Author  NAHUN SNYDER

 

 Organization  [facility]

 

 Address  [address] [place]  [address]



 

 Phone  [phone]







Care Team Providers







 Care Team Member Name  Role  Phone

 

 NAHUN SNYDER  Unavailable  [phone]







PROBLEMS







 Type  Condition  ICD9-CM Code  DLN09-AD Code  Onset Dates  Condition Status  
SNOMED Code

 

 Problem  ADHD (attention deficit hyperactivity disorder), inattentive type     
F90.0     Active  26969323

 

 Problem  Arthritis     M19.90     Active  4535456

 

 Problem  Positive skin test for tuberculosis     R76.11     Active  155974556

 

 Problem  Lumbago with sciatica, left side     M54.42     Active  475120442

 

 Problem  Excessive daytime sleepiness     G47.19     Active  825079463779

 

 Problem  Primary narcolepsy without cataplexy     G47.419     Active  
[id number]

 

 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 
    Active  [id number]







ALLERGIES

No Information



ENCOUNTERS







 Encounter  Location  Date  Diagnosis

 

 [place] [id number] [place] [address] 71439-
3240  04 May, 2018   

 

 [place] [id number] [place] [address] 07890-
2441  14 Mar, 2018  ADHD (attention deficit hyperactivity disorder), 
inattentive type F90.0 ; Lumbago with sciatica, left side M54.42 and Arthritis 
M19.90

 

 [facility]  [id number] [place] [address] 08406-
0469    ADHD (attention deficit hyperactivity disorder), 
inattentive type F90.0 and Lumbar neuritis M54.16

 

 [place] [id number] [place] [address] 17889-
3784     

 

 [place] [id number] [place] [address] 86752-
5923    Lumbar neuritis M54.16

 

 [place] [address] [place] [address] 46825-
9789  16 2018  Low back pain M54.5

 

 [facility]  [id number] [place] [address] 93385-
1054    ADHD (attention deficit hyperactivity disorder), 
inattentive type F90.0

 

 [facility]  [id number] [place] [address] 97302-
7137    Positive skin test for tuberculosis R76.11

 

 [facility]  [id number] [place] [address] 76848-
8387    Positive skin test for tuberculosis R76.11

 

 [facility]  [id number] [place] [address] 12587-
4805     

 

 [facility]  [id number] [place] [address] 18710-
1695    Lumbar neuritis M54.16

 

 [facility]  [id number] [place] [address] 48884-
5941    ADHD (attention deficit hyperactivity disorder), 
inattentive type F90.0

 

 [facility]  [id number] [place] [address] 98027-
0354     

 

 [facility]  [id number] [place] [address] 74054-
5261  20 Dec, 2017  Lumbar neuritis M54.16

 

 [facility]  [address] [place] [address] 42461-
5859  15 Dec, 2017  ADHD (attention deficit hyperactivity disorder), 
inattentive type F90.0

 

 [facility]  [id number] [place] [address] 86983-
7074  12 Dec, 2017   

 

 [facility]  [id number] [place] [address] 97066-
6946  11 Dec, 2017   

 

 [facility]  [id number] [place] [address] 35488-
9215    Lumbar neuritis M54.16

 

 [facility]  [id number] [place] [address] 47325-
6057    ADHD (attention deficit hyperactivity disorder), 
inattentive type F90.0 and Primary narcolepsy without cataplexy G47.419

 

 [facility]  [id number] [place] [address] 78250-
9718  10 Nov, 2017   

 

 [facility]  [id number] [place] [address] 03402-
5172     

 

 [facility]  [id number] [place] [address] 02740-
1796  23 Oct, 2017  Lumbar neuritis M54.16 and ADHD (attention deficit 
hyperactivity disorder), inattentive type F90.0

 

 [facility]  [id number] [place] [address] 96796-
3337  12 Oct, 2017   

 

 [facility]  [id number] [place] [address] 39249-
1773  26 Sep, 2017  Lumbar neuritis M54.16 and ADHD (attention deficit 
hyperactivity disorder), inattentive type F90.0

 

 [facility]  [id number] [place] [address] 14209-
5343  19 Sep, 2017   

 

 [facility]  [id number] [place] [address] 85671-
6366  31 Aug, 2017  ADHD (attention deficit hyperactivity disorder), 
inattentive type F90.0 and Lumbar neuritis M54.16

 

 [facility]  [id number] [place] [address] 43564-
8165  24 Aug, 2017  Lumbar neuritis M54.16

 

 [facility]  [id number] [place] [address] 82482-
2433  23 Aug, 2017   

 

 [facility]  [id number] [place] [address] 72129-
6324  02 Aug, 2017  ADHD (attention deficit hyperactivity disorder), 
inattentive type F90.0 and Lumbar neuritis M54.16

 

 [facility]  [address] [address] [address]-
4857  02 Aug, 2017   

 

 [facility]  [address] [address]-
7673     

 

 [facility]  [address] 467730-
0157     

 

 [facility]  [address] [address]-
9252     

 

 [facility]  [address] [address]-
8641     

 

 [facility]  [id number] [place] [address] 74919-
4590    ADHD (attention deficit hyperactivity disorder), 
inattentive type F90.0 and Lumbar neuritis M54.16

 

 [facility]  [address] [address]-
5320     

 

 [facility]  [address] [address]-
1537     

 

 [facility]  [address] [place] [address] 72541-
5222    Lumbar neuritis M54.16 and ADHD (attention deficit 
hyperactivity disorder), inattentive type F90.0

 

 [facility]  [address], [place] [address]-
7885     

 

 [facility]  [address], [place] [address]-
5598  10 May, 2017  Lumbar neuritis M54.16 and ADHD (attention deficit 
hyperactivity disorder), inattentive type F90.0

 

 [facility]  [address] 286459-
5856  03 May, 2017   

 

 [facility]  [address] 331810-
0548  01 May, 2017   

 

 [facility]  [address], [place] [address]-
1163    Narcolepsy due to underlying condition without cataplexy 
G47.429 and Lumbago with sciatica, left side M54.42

 

 [facility]  [id number] [place] [address] 01479-
1834    Lumbar neuritis M54.16 and ADHD (attention deficit 
hyperactivity disorder), inattentive type F90.0

 

 [facility]  [id number] [place] [address] 69651-
4530  31 Mar, 2017   

 

 [facility]  [id number] [place] [address] 52074-
2474  15 Mar, 2017  Lumbar neuritis M54.16 and ADHD (attention deficit 
hyperactivity disorder), inattentive type F90.0

 

 [facility]  [id number] [place] [address] 39031-
9219  15 Mar, 2017   

 

 [facility]  [id number] [place] [address] 51830-
4170  06 Mar, 2017   

 

 [facility]  [id number] [place] [address] 91346-
6504  15 Feb, 2017  ADHD (attention deficit hyperactivity disorder), 
inattentive type F90.0

 

 [facility]  [id number] [place] [address] 77512-
1135  15 Feb, 2017  Lumbar neuritis M54.16

 

 [facility]  [id number] [place] [address] 57169-
8119  08 2017   

 

 [facility]  [id number] [place] [address] 86723-
3656  07 2017   

 

 [facility]  [id number] [place] [address] 61850-
0137    Attention deficit hyperactivity disorder (ADHD), 
predominantly inattentive type F90.0

 

 [facility]  [id number] [place] [address] 71500-
2262     

 

 [facility]  [id number] [place] [address] 29705-
7011  10 Niall, 2017   

 

 [facility]  [address] [place] [address] 30935-
9881     

 

 [facility]  [id number] [place] [address] 03326-
8133  22 Dec, 2016  Attention deficit hyperactivity disorder (ADHD), 
predominantly inattentive type F90.0

 

 [facility]  [id number] [place] [address] 47437-
1291  12 Dec, 2016   

 

 [facility]  [id number] [place] [address] 75383-
0048  07 Dec, 2016   

 

 [facility]  [id number] [place] [address] 61972-
6827  05 Dec, 2016   

 

 [facility]  [id number] [place] [address] 13361-
2676  05 Dec, 2016  Low TSH level R94.6

 

 [facility]  [id number] [place] [address] 30193-
1598  05 Dec, 2016   

 

 [facility]  [id number] [place] [address] 81610-
6541  02 Dec, 2016   

 

 [facility]  [id number] [place] [address] 94932-
2592  10 Nov, 2016   

 

 [facility]  [id number] [place] [address] 70713-
9575  10 Nov, 2016   

 

 [facility]  [id number] [place] [address] 95897-
7341     

 

 [facility]  [address] [place] [address] 33740-
9703  18 Oct, 2016  Low TSH level R94.6

 

 [facility]  [id number] [place] [address] 89863-
8780  17 Oct, 2016  Excessive daytime sleepiness G47.19 and ADHD (attention 
deficit hyperactivity disorder), inattentive type F90.0

 

 [facility]  [id number] [place] [address] 63289-
6401  13 Oct, 2016   

 

 [facility]  [address] [facility] [id number] [place] [address]-
4258  12 Oct, 2016   

 

 [facility] [place] [facility]  [address] [facility] [id number] [place] [address]-
0921  03 Oct, 2016   

 

 [facility]  [address] [facility] [id number] [place] [address]-
0186  28 Sep, 2016   

 

 [facility] [place] [facility]  [address] [facility] [id number] [place] [address], [place] [address]-
3928  14 Sep, 2016   

 

 [facility] [place] [facility]  [address] [facility] [id number] [place] [address]-
1331  01 Sep, 2016   

 

 [facility] [place] [facility]  [id number] [place] [address], [place] [address]-
9400  17 Aug, 2016   

 

 [facility] [place] [facility]  [id number] [place] [address], [place] [address]-
2782  04 Aug, 2016   

 

 [facility] [place] [facility]  [address] [facility] [id number] [place] [address], [place] [address]-
1804  03 Aug, 2016   

 

 [facility]  [id number] [address]-
5187    Lumbar neuritis M54.16

 

 [facility]  [id number] [address]-
4656     

 

 [facility] [place] [facility]  [address], [place] [address]-
0917     

 

 [facility] [place] [facility]  [address] [facility] [id number] [address]-
0228    Lumbar neuritis M54.16

 

 [facility] [place] [facility]  [address] [facility] [id number] [place]-
1077     

 

 [facility] [place] [facility]  [address] [facility] [id number] [place] [address]-
5891     

 

 [facility] [place] [facility]  [address], [place] [address]-
6669  26 May, 2016  Lumbar neuritis M54.16

 

 [facility]  [address] [place] [address]-
4331  25 May, 2016   

 

 [facility]  [id number] [place] [address] 78367-
2347  10 May, 2016   

 

 [facility]  [id number] [place] [address] 37993-
8998    Lumbar neuritis M54.16

 

 [facility]  [id number] [place] [address] 90059-
7885     

 

 [facility]  [id number] [place] [address] 29899-
6073     

 

 [facility]  [address] [place] [address] 90208-
3212  23 Mar, 2016   

 

 [facility]  [address] [place] [address] 26653-
2677  14 Mar, 2016   

 

 [facility]  [address] [place] [address] 06489-
4952  14 Mar, 2016   

 

 [facility]  [address] [place] [address] 17668-
1026  11 Mar, 2016   

 

 [facility]  [address] [address]-
2253  24 2016   

 

 [facility]  [id number] [place] [address] 92571-
7612  22 2016  Inguinal hernia, right K40.90

 

 [facility]  [address], [place] [address]-
2673  17 2016   

 

 [facility]  [id number] [place] [address] 05178-
1472  15 2016  Low back pain M54.5 and Sciatica, unspecified side M54.30

 

 [facility]  [address], [place] [address]-
7989     

 

 [facility]  [address] [id number] [place] [address]-
5694     

 

 [facility]  [address] [facility] [id number] [place] [address], [place] [address]-
1913  30 Dec, 2015   

 

 [facility] [place] [facility]  [address] [facility] [id number] [place] [address]-
8437  28 Dec, 2015   

 

 [facility]  [address] [facility] [id number] [place] [address], [place] [address]-
6994  02 Dec, 2015   

 

 [facility] [place] [facility]  [address] [facility] [id number] [place] [address]-
0227     

 

 [facility]  [id number] [place] [address], [place] [address]-
3124     

 

 [facility]  [id number] [place] [address], [place] [address]-
7908     

 

 [facility]  [id number] [place] [address], [place] [address]-
8314     

 

 [facility]  [id number] [place] [address] 95561-
7846  26 Oct, 2015   

 

 [facility]  [id number] [place] [address] 56621-
0218  22 Oct, 2015  Encounter for immunization Z23

 

 [facility]  [address], [place] [address]-
9276  07 Oct, 2015   

 

 [facility]  [id number], [place] [address]-
8714  05 Oct, 2015   

 

 [facility]  [id number], [place] [address]-
6878  09 Sep, 2015   

 

 [facility]  [id number] [place] [address]-
9461  08 Sep, 2015   

 

 [facility]  [id number], [place] [address]-
2212  19 Aug, 2015  Lumbago 724.2

 

 [facility]  [id number] [place] [address]-
0863  12 Aug, 2015   

 

 [facility] [place] [facility]  [address] [id number] [place] [address]-
4081    Lumbago 724.2

 

 [facility] [place] [facility]  [address] [id number] [place] [address]-
3166     

 

 [facility] [place] [facility]  [address] [id number] [place] [address]-
1800     

 

 [facility] [place] [facility]  [address] [id number] [place] [address]-
4376     

 

 [facility] [place] [facility]  [address] [id number] [place] [address]-
5404     

 

 [facility] [place] [facility]  [address] [id number] [place] [address]-
3822     

 

 [facility] [place] [facility]  [address] [id number] [place] [address]-
1428  22 May, 2015   

 

 [facility] [place] [facility]  [address] [id number] [place] [address]-
4046     

 

 [facility] [place] [facility]  [address] [id number] [place] [address]-
0778     

 

 [facility] [place] [facility]  [address] [facility] [id number] [place] [address]-
5789  30 Mar, 2015   

 

 [facility] [place] [facility]  [address] [id number] [place] [address]-
2146  30 Mar, 2015   

 

 [facility] [place] [facility]  [address] [id number] [place] [address]-
4859  02 Mar, 2015   

 

 [facility] [place] [facility]  [address] [id number] [place] [address]-
6446  02 Mar, 2015   

 

 [facility] [place] [facility]  [address] [id number] [place] [address]-
2516     

 

 [facility] [place] [facility]  [address] [id number] [place] [address]-
5682     

 

 [facility] [place] [facility]  [address] [id number] [place] [address]-
7648     

 

 [facility] [place] [facility]  [address] [id number] [place] [address]-
3276     

 

 [facility] [place] [facility]  [address] [facility] [id number] [place] [address]-
4901     

 

 [facility] [place] [facility]  [address] [id number] [place] [address]-
4788     

 

 [facility] [place] [facility]  [address] [id number] [place] [address]-
0647  31 Dec, 2014   

 

 [facility] [place] [facility]  [address] [id number] [place] [address]-
7833  31 Dec, 2014   

 

 [facility] [place] [facility]  [address] [id number] [place] [address]-
4841  22 Dec, 2014   

 

 [facility] [place] [facility]  [address] [id number] [place] [address]-
2331  22 Dec, 2014   

 

 [facility] [place] [facility]  [address] [id number] [place] [address]-
6975  08 Dec, 2014   

 

 [facility] [place] [facility]  [address] [facility] [id number] [place] [address]-
0426  08 Dec, 2014   

 

 [facility] [place] [facility]  [address] [id number] [place] [address]-
9898  10 Nov, 2014   

 

 [facility] [place] [facility]  [address] [id number][place] [address]-
9365  10 Nov, 2014   

 

 [facility] [place] [facility]  [address] [id number] [place] [address]-
0533  13 Oct, 2014   

 

 [facility] [place] [facility]  [address] [id number] [place] [address]-
7665  13 Oct, 2014   

 

 [facility] [place] [facility]  [address] [facility] [id number] [place] [address]-
0556  01 Oct, 2014   

 

 [facility] [place] [facility]  [address] [id number] [place] [address]-
0589  01 Oct, 2014   

 

 [facility] [place] [facility]  [address] [id number] [place] [address]-
2251  15 Sep, 2014   

 

 [facility] [place] [facility]  [address] [id number] [place]
4602  15 Sep, 2014   

 

 [facility] [place] [facility]  [address] [id number] [place] [address]-
8501  18 Aug, 2014   

 

 [facility] [place] [facility]  [address] [id number] [place] [address]-
3541  18 Aug, 2014   

 

 [facility] [place] [facility]  [address] [id number] [place] [address]-
6513  18 Aug, 2014   

 

 [facility] [place] [facility]  [address] [id number] [place] [address]-
1038  18 Aug, 2014   

 

 [facility] [place] [facility]  [address] [id number] [place] [address]-
4890     

 

 [facility] [place] [facility]  [address] [id number] [place] [address]-
0876     

 

 [facility] [place] [facility]  [address] [id number] [place] [address]-
4817     

 

 [facility] [place] [facility]  [address] [id number] [place] [address]-
8915     

 

 [facility] [place] [facility]  [address] [id number] [place] [address]-
4217     

 

 [facility] [place] [facility]  [address] [id number] [place] [address]-
6223     

 

 [facility] [place] [facility]  [address] [id number] [place] [address]-
7475  30 May, 2014   

 

 [facility] [place] [facility]  [address] [id number] [place] [address]-
1604  28 May, 2014   

 

 [facility] [place] [facility]  [address] [id number] [place] [address]-
5239  28 May, 2014   

 

 [facility] [place] [facility]  [address] [id number] [place] [address]-
0412     

 

 [facility] [place] [facility]  [address] [id number] [place] [address]-
6109     

 

 [facility] [place] [facility]  [address] [id number] [place] [address]-
3623     

 

 [facility] [place] [facility]  [address] [id number] [place] [address]-
8473     

 

 [facility] [place] [facility]  [address] [id number] [place] [address]-
8288     

 

 [facility] [place] [facility]  [address] [id number] [place] [address]-
5857     

 

 [facility] [place] [facility]  [address] [id number] [place] [address]-
2367     

 

 [facility] [place] [facility]  [address] [id number] [place] [address]-
5780     

 

 [facility] [place] [facility]  [address] [id number] [place] [address]-
1418     

 

 [facility] [place] [facility]  [address] [id number] [place] [address]-
9059     

 

 [facility] [place] [facility]  [address] [id number] [place] [address]-
9852     

 

 [facility] [place] [facility]  [address] [id number] [place] [address]-
2536  28 Mar, 2014   

 

 [facility] [place] [facility]  [address] [id number] [place] [address]-
9833  27 Mar, 2014   

 

 [facility] [place] [facility]  [address] [id number] [place] [address]-
7883  27 Mar, 2014   

 

 [facility] [place] [facility]  [address] [id number] [place] [address]-
3483  26 Mar, 2014   

 

 [facility] [place] [facility]  [address] [id number] [place] [address]-
1281  26 Mar, 2014   

 

 [facility] [place] [facility]  [address] [id number] [place] [address]-
3206     

 

 [facility] [place] [facility]  [address] [id number] [place] [address]-
7336     

 

 [facility] [place] [facility]  [address] [id number] [place] [address]-
1026     

 

 [facility] [place] [facility]  [address] [id number] [place] [address]-
1968     

 

 [facility] [place] [facility]  [address] [id number] [place] [address]-
5137     

 

 [facility] [place] [facility]  [address] [id number] [place] [address]-
2156     

 

 [facility] [place] [facility]  [address] [id number] [place] [address]-
5715     

 

 [facility] [place] [facility]  [address] [id number] [place] [id number]
[id number]     

 

 [facility] [place] [facility]  [address] [id number] [place] [address]-
[address]     

 

 [facility] [place] [facility]  [address] [id number] [place] [address]-
[address]     

 

 [facility] [place] [facility]  [address] [id number] [place] [address]-
[address]     

 

 [facility] [place] [facility]  [address] [id number] [place] [address]-
[address]     

 

 [facility] [place] [facility]  [address] [id number] [place] [address]-
[address]     

 

 [facility] [place] [facility]  [address] [id number] [place] [address]-
5[address]  10 Dec, 2013   

 

 [facility] [place] [facility]  [address] [id number] [place] [address]-
[address]  10 Dec, 2013   

 

 [facility]  [address] [id number] [place] [id number]-
[address]     

 

 [facility]  [address] [id number] [address]-
[address]     







IMMUNIZATIONS

No Known Immunizations



SOCIAL HISTORY

Never Assessed



REASON FOR VISIT

Controlled Med Refill 17



PLAN OF CARE





VITAL SIGNS





MEDICATIONS







 Medication  Instructions  Dosage  Frequency  Start Date  End Date  Duration  
Status

 

 Tramadol HCl 50 MG  Orally every 6 hrs  1 tablet as needed  6h  02 Dec, 2016  
   28 days  Active

 

 Adderall XR 20 MG  Orally Once a day  1 capsule in the morning  24h  04 Aug, 
2017     28 days  Active







RESULTS

No Results



PROCEDURES

No Known procedures



INSTRUCTIONS





MEDICATIONS ADMINISTERED

No Known Medications



MEDICAL (GENERAL) HISTORY







 Type  Description  Date

 

 Medical History  narcolepsy   

 

 Surgical History  Gallbladder removed  2015

 

 Surgical History  Hernia repair  2016

## 2018-09-02 NOTE — XMS REPORT
[facility]

 Created on: 2018



Sofiya Singh

External Reference #: [id number]

: 1955

Sex: Female



Demographics







 Address  [address] [place]  [address]

 

 Preferred Language  Unknown

 

 Marital Status  Unknown

 

 Worship Affiliation  Unknown

 

 Race  Unknown

 

 Ethnic Group  Unknown





Author







 Author  NAHUN SNYDER

 

 Organization  [facility]

 

 Address  [address] [place]  [address]



 

 Phone  [phone]







Care Team Providers







 Care Team Member Name  Role  Phone

 

 NAHUN SNYDER  Unavailable  [phone]







PROBLEMS







 Type  Condition  ICD9-CM Code  PII59-RC Code  Onset Dates  Condition Status  
SNOMED Code

 

 Problem  ADHD (attention deficit hyperactivity disorder), inattentive type     
F90.0     Active  99254190

 

 Problem  Arthritis     M19.90     Active  4884286

 

 Problem  Positive skin test for tuberculosis     R76.11     Active  715943867

 

 Problem  Lumbago with sciatica, left side     M54.42     Active  246826759

 

 Problem  Excessive daytime sleepiness     G47.19     Active  806093805127

 

 Problem  Primary narcolepsy without cataplexy     G47.419     Active  
[id number]

 

 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 
    Active  [id number]







ALLERGIES

No Information



ENCOUNTERS







 Encounter  Location  Date  Diagnosis

 

 [place] [id number] [place] [address] 88652-
9509    Right lower quadrant abdominal pain R10.31 and Rash R21

 

 [place] [id number] [place] [address] 15665-
3558  14 Mar, 2018  ADHD (attention deficit hyperactivity disorder), 
inattentive type F90.0 ; Lumbago with sciatica, left side M54.42 and Arthritis 
M19.90

 

 [facility]  [id number] [place] [address] 01836-
9477    ADHD (attention deficit hyperactivity disorder), 
inattentive type F90.0 and Lumbar neuritis M54.16

 

 [facility]  [id number] [place] [address] 87429-
4748     

 

 [place] [id number] [place] [address] 36637-
6559    Lumbar neuritis M54.16

 

 [facility]  [address] [place] [address] 14598-
0055  16 2018  Low back pain M54.5

 

 [facility]  [id number] [place] [address] 32458-
3270  11 2018  ADHD (attention deficit hyperactivity disorder), 
inattentive type F90.0

 

 [facility]  [id number] [place] [address] 68473-
2830  09 2018  Positive skin test for tuberculosis R76.11

 

 [facility]  [id number] [place] [address] 80362-
8346    Positive skin test for tuberculosis R76.11

 

 [facility]  [id number] [place] [address] 55011-
1675     

 

 [facility]  [id number] [place] [address] 75003-
0309    Lumbar neuritis M54.16

 

 [facility]  [id number] [place] [address] 42856-
4100    ADHD (attention deficit hyperactivity disorder), 
inattentive type F90.0

 

 [facility]  [address] [place] [address] 11256-
5944     

 

 [facility]  [address] [place] [address] 84526-
4661  20 Dec, 2017  Lumbar neuritis M54.16

 

 [facility]  [id number] [place] [address] 30706-
5221  15 Dec, 2017  ADHD (attention deficit hyperactivity disorder), 
inattentive type F90.0

 

 [facility]  [id number] [place] [address] 91696-
8786  12 Dec, 2017   

 

 [facility]  [id number] [place] [address] 47646-
9327  11 Dec, 2017   

 

 [facility]  [id number] [place] [address] 26805-
5771    Lumbar neuritis M54.16

 

 [facility]  [id number] [place] [address] 78158-
2634    ADHD (attention deficit hyperactivity disorder), 
inattentive type F90.0 and Primary narcolepsy without cataplexy G47.419

 

 [facility]  [id number] [place] [address] 30870-
5077  10 Nov, 2017   

 

 [facility]  [id number] [place] [address] 47436-
4881     

 

 [facility]  [id number] [place] [address] 65284-
4230  23 Oct, 2017  Lumbar neuritis M54.16 and ADHD (attention deficit 
hyperactivity disorder), inattentive type F90.0

 

 [facility]  [id number] [place] [address] 47891-
4101  12 Oct, 2017   

 

 [facility]  [id number] [place] [address] 64606-
4401  26 Sep, 2017  Lumbar neuritis M54.16 and ADHD (attention deficit 
hyperactivity disorder), inattentive type F90.0

 

 [facility]  [id number] [place] [address] 24703-
2052  19 Sep, 2017   

 

 [facility]  [id number] [place] [address] 84225-
8275  31 Aug, 2017  ADHD (attention deficit hyperactivity disorder), 
inattentive type F90.0 and Lumbar neuritis M54.16

 

 [facility]  [id number] [place] [address] 40434-
6310  24 Aug, 2017  Lumbar neuritis M54.16

 

 [facility]  [id number] [place] [address] 98292-
8642  23 Aug, 2017   

 

 [facility]  [id number] [place] [address] 66141-
1102  02 Aug, 2017  ADHD (attention deficit hyperactivity disorder), 
inattentive type F90.0 and Lumbar neuritis M54.16

 

 [facility]  [address] [address]-
0869  02 Aug, 2017   

 

 [facility]  [address] [address]-
6151     

 

 [facility]  [address] [address]-
9767     

 

 [facility]  [id number] [place] [address] 07223-
1388     

 

 [facility]  [id number] [place] [address] 75207-
9622     

 

 [facility]  [id number] [place] [address] 50876-
8994    ADHD (attention deficit hyperactivity disorder), 
inattentive type F90.0 and Lumbar neuritis M54.16

 

 [facility]  [id number] [place] [address] 86834-
7084     

 

 [facility]  [address] [address]-
5484     

 

 [facility]  [id number] [place] [address] 56869-
0229    Lumbar neuritis M54.16 and ADHD (attention deficit 
hyperactivity disorder), inattentive type F90.0

 

 [facility]  [address] [address]-
5720     

 

 [facility]  [address] [address]-
5775  10 May, 2017  Lumbar neuritis M54.16 and ADHD (attention deficit 
hyperactivity disorder), inattentive type F90.0

 

 [facility]  [address] [address]-
3286  03 May, 2017   

 

 [facility]  [address] [address]-
7641  01 May, 2017   

 

 [facility]  [address] [place] [address] 29740-
2885    Narcolepsy due to underlying condition without cataplexy 
G47.429 and Lumbago with sciatica, left side M54.42

 

 [facility]  [id number] [place] [address] 37317-
1108  14 2017  Lumbar neuritis M54.16 and ADHD (attention deficit 
hyperactivity disorder), inattentive type F90.0

 

 [facility]  [id number] [place] [address] 05508-
4797  31 Mar, 2017   

 

 [facility]  [id number] [place] [address] 17063-
6431  15 Mar, 2017  Lumbar neuritis M54.16 and ADHD (attention deficit 
hyperactivity disorder), inattentive type F90.0

 

 [facility]  [id number] [place] [address] 87630-
9836  15 Mar, 2017   

 

 [facility]  [id number] [place] [address] 31062-
5725  06 Mar, 2017   

 

 [facility]  [id number] [place] [address] 53593-
5554  15 2017  ADHD (attention deficit hyperactivity disorder), 
inattentive type F90.0

 

 [facility]  [id number] [place] [address] 77107-
6914  15 2017  Lumbar neuritis M54.16

 

 [facility]  [id number] [place] [address] 56884-
5204  08 2017   

 

 [facility]  [id number] [place] [address] 58420-
3581  07 2017   

 

 [facility]  [id number] [place] [address] 47996-
2989    Attention deficit hyperactivity disorder (ADHD), 
predominantly inattentive type F90.0

 

 [facility]  [id number] [place] [address] 70239-
1658     

 

 [facility]  [address] [place] [address]-
0579  10 Niall, 2017   

 

 [facility]  [id number] [place] [address] 75380-
6745     

 

 [facility]  [id number] [place] [address] 35308-
4597  22 Dec, 2016  Attention deficit hyperactivity disorder (ADHD), 
predominantly inattentive type F90.0

 

 [facility]  [id number] [place] [address] 64926-
6526  12 Dec, 2016   

 

 [facility]  [address] [place] [address] 19926-
9518  07 Dec, 2016   

 

 [facility]  [id number] [place] [address] 60450-
4182  05 Dec, 2016   

 

 [facility]  [id number] [place] [address] 74741-
9518  05 Dec, 2016  Low TSH level R94.6

 

 [facility]  [address] [address]-
8405  05 Dec, 2016   

 

 [facility]  [address] [place] [address] 57179-
7532  02 Dec, 2016   

 

 [facility]  [id number] [place] [address] 51769-
4527  10 Nov, 2016   

 

 [facility]  [address] [address]-
4723  10 Nov, 2016   

 

 [facility]  [address] [address]-
2345     

 

 [facility]  [address] [address]-
0453  18 Oct, 2016  Low TSH level R94.6

 

 [facility]  [id number] [place] [address] 32207-
7560  17 Oct, 2016  Excessive daytime sleepiness G47.19 and ADHD (attention 
deficit hyperactivity disorder), inattentive type F90.0

 

 [facility]  [id number] [place] [address]-
3440  13 Oct, 2016   

 

 [facility]  [address] [facility] [id number] [place] [address]-
5040  12 Oct, 2016   

 

 [facility] [place] [facility]  [address] [facility] [id number] [place] [address]-
9187  03 Oct, 2016   

 

 [facility]  [address] [facility] [id number] [place] [address]-
9893  28 Sep, 2016   

 

 [facility] [place] [facility]  [address] [facility] [id number] [place] [address]-
8076  14 Sep, 2016   

 

 [facility]  [address] [facility] [id number] [place] [address], [place] [address]-
7344  01 Sep, 2016   

 

 [facility] [place] [facility]  [id number] [place] [address]-
7990  17 Aug, 2016   

 

 [facility]  [address] [place] [address], [place] [address]-
4600  04 Aug, 2016   

 

 [facility]  [address] [facility] [id number] [place] [address]-
2696  03 Aug, 2016   

 

 [facility]  [address] [place] [address], [place] [address]-
9875    Lumbar neuritis M54.16

 

 [facility]  [address] [address]-
0060     

 

 [facility] [place] [facility]  [address] [place] [address]-
5017     

 

 [facility] [place] [facility]  [address] [facility] [id number] [address]-
0987    Lumbar neuritis M54.16

 

 [facility]  [address] [facility] [id number] [place] [address]-
8838     

 

 [facility] [place] [facility]  [id number] [place] [address]-
3961     

 

 [facility] [place] [facility]  [address] [address]-
6809  26 May, 2016  Lumbar neuritis M54.16

 

 [facility]  [address] [address]-
7548  25 May, 2016   

 

 [facility]  [id number] [address]-
7840  10 May, 2016   

 

 [facility]  [address] [place] [address] 19787-
7118    Lumbar neuritis M54.16

 

 [facility]  [address] [address]-
6741     

 

 [facility]  [id number] [place] [address] 66160-
0135     

 

 [facility]  [address] [place] [address] 25176-
3265  23 Mar, 2016   

 

 [facility]  [id number] [place] [address] 85730-
3886  14 Mar, 2016   

 

 [facility]  [address] [address]-
2025  14 Mar, 2016   

 

 [facility]  [address] [place] [address] 32942-
3038  11 Mar, 2016   

 

 [facility]  [address] [address]-
7036  24 2016   

 

 [facility]  [address] [address]-
6525    Inguinal hernia, right K40.90

 

 [facility]  [address] [address]-
4413  17 2016   

 

 [facility]  [address] [place] [address] 94068-
9657  15 2016  Low back pain M54.5 and Sciatica, unspecified side M54.30

 

 [facility]  [address] [address]-
9817     

 

 [facility] [place] [facility]  [address] [facility] [id number] [address]-
1863     

 

 [facility] [place] [facility]  [address] [facility] [id number] [place] [address], [place] [address]-
2907  30 Dec, 2015   

 

 [facility] [place] [facility]  [address] [facility] [id number] [place] [address]-
9495  28 Dec, 2015   

 

 [facility] [place] [facility]  [address] [facility] [id number] [place] [address], [place] [address]-
8024  02 Dec, 2015   

 

 [facility] [place] [facility]  [address] [facility] [id number] [place] [address], [place] [address]-
0609     

 

 [facility] [place] [facility]  [address] [facility] [id number] [place] [address] 64010-
8960     

 

 [facility]  [id number] [place] [address], [place] [address]-
2514     

 

 [facility] [place] [facility]  [id number] [place] [address] 56249-
7595     

 

 [facility] [place] [facility]  [address] [address]-
5584  26 Oct, 2015   

 

 [facility]  [address] [place] [address] 58172-
1340  22 Oct, 2015  Encounter for immunization Z23

 

 [facility]  [id number] [address]-
0369  07 Oct, 2015   

 

 [facility] [place] [facility]  [address] [address]-
8810  05 Oct, 2015   

 

 [facility] [place] [facility]  [address] [facility] [id number][place] [address]-
5715  09 Sep, 2015   

 

 [facility] [place] [facility]  [id number] [address]-
6068  08 Sep, 2015   

 

 [facility] [place] [facility]  [address], [place] [address]-
5109  19 Aug, 2015  Lumbago 724.2

 

 [facility] [place] [facility]  [address] [id number] [place] [address]-
8466  12 Aug, 2015   

 

 [facility] [place] [facility]  [address] [id number] [place] [address]-
1743    Lumbago 724.2

 

 [facility] [place] [facility]  [address] [id number] [place] [address]-
5288     

 

 [facility] [place] [facility]  [address] [id number] [place] [address]-
3018     

 

 [facility] [place] [facility]  [address] [id number] [place] [address]-
0900     

 

 [facility] [place] [facility]  [address] [id number] [place] [address]-
7249     

 

 [facility] [place] [facility]  [address] [id number] [place] [address]-
9658     

 

 [facility] [place] [facility]  [address] [id number] [place] [address]-
5832  22 May, 2015   

 

 [facility] [place] [facility]  [address] [id number] [place] [address]-
6487     

 

 [facility] [place] [facility]  [address] [id number] [place] [address]-
0848     

 

 [facility] [place] [facility]  [address] [id number] [place] [address]-
0139  30 Mar, 2015   

 

 [facility] [place] [facility]  [address] [id number] [place] [address]-
8010  30 Mar, 2015   

 

 [facility] [place] [facility]  [address] [id number] [place] [address]-
8278  02 Mar, 2015   

 

 [facility] [place] [facility]  [address] [id number] [place]
4648  02 Mar, 2015   

 

 [facility] [place] [facility]  [address] [id number] [place] [address]-
6532     

 

 [facility] [place] [facility]  [address] [id number] [place] [address]-
3318  ,    

 

 [facility] [place] [facility]  [address] [id number] [place] [address]-
1014     

 

 [facility] [place] [facility]  [address] [id number] [place] [address]-
7986     

 

 [facility] [place] [facility]  [address] [id number] [place] [address]-
1120     

 

 [facility] [place] [facility]  [address] [id number] [place] [address]-
8651     

 

 [facility] [place] [facility]  [address] [id number] [place]
6800  31 Dec, 2014   

 

 [facility] [place] [facility]  [address] [id number] [place] [address]-
6892  31 Dec, 2014   

 

 [facility] [place] [facility]  [address] [id number] [place] [address]-
5404  22 Dec, 2014   

 

 [facility] [place] [facility]  [address] [id number] [place] [address]-
8803  22 Dec, 2014   

 

 [facility] [place] [facility]  [address] [id number] [place] [address]-
0606  08 Dec, 2014   

 

 [facility] [place] [facility]  [address] [facility] [id number] [place] [address]-
2698  08 Dec, 2014   

 

 [facility] [place] [facility]  [address] [id number] [place] [address]-
1282  10 Nov, 2014   

 

 [facility] [place] [facility]  [address] [id number] [place] [address]-
7253  10 Nov, 2014   

 

 [facility] [place] [facility]  [address] [id number] [place] [address]-
3432  13 Oct, 2014   

 

 [facility] [place] [facility]  [address] [id number] [place]
2759  13 Oct, 2014   

 

 [facility] [place] [facility]  [address] [id number] [place] [address]-
5604  01 Oct, 2014   

 

 [facility] [place] [facility]  [address] [id number] [place] [address]-
1204  01 Oct, 2014   

 

 [facility] [place] [facility]  [address] [id number] [place]
7382  15 Sep, 2014   

 

 [facility] [place] [facility]  [address] [id number] [place] [address]-
1282  15 Sep, 2014   

 

 [facility] [place] [facility]  [address] [id number] [place] [address]-
6897  18 Aug, 2014   

 

 [facility] [place] [facility]  [address] [id number] [place] [address]-
9726  18 Aug, 2014   

 

 [facility] [place] [facility]  [address] [id number] [place] [address]-
8756  18 Aug, 2014   

 

 [facility] [place] [facility]  [address] [id number] [place] [address]-
0175  18 Aug, 2014   

 

 [facility] [place] [facility]  [address] [id number] [place] [address]-
7474     

 

 [facility] [place] [facility]  [address] [id number] [place] [address]-
3606     

 

 [facility] [place] [facility]  [address] [id number] [place] [address]-
7221     

 

 [facility] [place] [facility]  [address] [id number] [place] [address]-
9862     

 

 [facility] [place] [facility]  [address] [id number] [place] [address]-
2332     

 

 [facility] [place] [facility]  [address] [id number] [place] [address]-
2235     

 

 [facility] [place] [facility]  [address] [id number] [place] [address]-
4133  30 May, 2014   

 

 [facility] [place] [facility]  [address] [id number] [place] [address]-
6938  28 May, 2014   

 

 [facility] [place] [facility]  [address] [id number] [place] [address]-
1212  28 May, 2014   

 

 [facility] [place] [facility]  [address] [id number] [place] [address]-
7200     

 

 [facility] [place] [facility]  [address] [id number] [place] [address]-
7746     

 

 [facility] [place] [facility]  [address] [id number] [place] [address]-
4447     

 

 [facility] [place] [facility]  [address] [id number] [place] [address]-
1385     

 

 [facility] [place] [facility]  [address] [id number] [place] [address]-
8184     

 

 [facility] [place] [facility]  [address] [id number] [place] [address]-
3484     

 

 [facility] [place] [facility]  [address] [id number] [place] [address]-
2205     

 

 [facility] [place] [facility]  [address] [id number] [place] [address]-
2512     

 

 [facility] [place] [facility]  [address] [id number] [place] [address]-
3138     

 

 [facility] [place] [facility]  [address] [id number] [place] [address]-
5362     

 

 [facility] [place] [facility]  [address] [id number] [place] [address]-
5437     

 

 [facility] [place] [facility]  [address] [id number] [place] [address]-
4784  28 Mar, 2014   

 

 [facility] [place] [facility]  [address] [id number] [place] [address]-
1049  27 Mar, 2014   

 

 [facility] [place] [facility]  [address] [id number] [place] [address]-
7014  27 Mar, 2014   

 

 [facility] [place] [facility]  [address] [id number] [place] [address]-
8105  26 Mar, 2014   

 

 [facility] [place] [facility]  [address] [id number] [place] [address]-
9612  26 Mar, 2014   

 

 [facility] [place] [facility]  [address] [id number] [place] [address]-
3386     

 

 [facility] [place] [facility]  [address] [id number] [place] [address]-
4156     

 

 [facility] [place] [facility]  [address] [id number] [place] [address]-
3996     

 

 [facility] [place] [facility]  [address] [id number] [place] [address]-
7186     

 

 [facility] [place] [facility]  [address] [id number] [place] [address]-
4624     

 

 [facility] [place] [facility]  [address] [id number] [place] [address]-
1554     

 

 [facility] [place] [facility]  [address] [id number] [place] [address]-
5075     

 

 [facility] [place] [facility]  [address] [id number] [place] [address]-
9563     

 

 [facility] [place] [facility]  [address] [facility] [id number] [place] [address]-
2126     

 

 [facility] [place] [facility]  [address] [id number] [place] [address]-
0474     

 

 [facility] [place] [facility]  [address] [id number] [place] [address]-
1751     

 

 [facility] [place] [facility]  [address] [id number] [place] [address]-
7139     

 

 [facility] [place] [facility]  [address] [id number] [place] [address]-
1606     

 

 [facility] [place] [facility]  [address] [id number] [place] [address]-
2954  10 Dec, 2013   

 

 [facility]  [address] [id number] [place] [address]-
2006  10 Dec, 2013   

 

 [facility]  [address] [facility] [id number] [place] [address]     

 

 [facility]  [address] [facility] [id number] [place] [address]     







IMMUNIZATIONS

No Known Immunizations



SOCIAL HISTORY

Never Assessed



REASON FOR VISIT

letter



PLAN OF CARE





VITAL SIGNS





MEDICATIONS

No Known Medications



RESULTS

No Results



PROCEDURES

No Known procedures



INSTRUCTIONS





MEDICATIONS ADMINISTERED

No Known Medications



MEDICAL (GENERAL) HISTORY







 Type  Description  Date

 

 Medical History  narcolepsy   

 

 Surgical History  Gallbladder removed  2015

 

 Surgical History  Hernia repair  2016

## 2018-09-02 NOTE — XMS REPORT
[facility]

 Created on: 2017



Sofiya Singh

External Reference #: [id number]

: 1955

Sex: Female



Demographics







 Address  [address] [place]  [address]

 

 Preferred Language  Unknown

 

 Marital Status  Unknown

 

 Shinto Affiliation  Unknown

 

 Race  Unknown

 

 Ethnic Group  Unknown





Author







 Author  NAHUN SNYDER

 

 [facility]

 

 Address  [address] [place]  [address]



 

 Phone  [phone]







Care Team Providers







 Care Team Member Name  Role  Phone

 

 NAHUN SNYDER  Unavailable  [phone]







PROBLEMS







 Type  Condition  ICD9-CM Code  XTU38-YW Code  Onset Dates  Condition Status  
SNOMED Code

 

 Problem  Lumbago  724.2        Active  097416718

 

 Problem  Family history of diabetes mellitus  V18.0        Active  448114138

 

 Problem  Anxiety state, unspecified  300.00        Active  183171468

 

 Problem  Screening examination for pulmonary tuberculosis  V74.1        Active
  985192154

 

 Problem  Pneumonia, organism unspecified  486        Active  584982651

 

 Problem  Pain in joint, pelvic region and thigh  719.45        Active  
656545318

 

 Problem  Excessive daytime sleepiness     G47.19     Active  101256491468

 

 Problem  ADHD (attention deficit hyperactivity disorder), inattentive type     
F90.0     Active  09939925

 

 Problem  Family history of other cardiovascular diseases  V17.49        Active
  394721319

 

 Problem  Family history of malignant neoplasm of breast  V16.3        Active  
333111657

 

 Problem  Unspecified arthropathy, site unspecified  716.90        Active  
324298952

 

 Problem  Family history of ischemic heart disease  V17.3        Active  
414234088







ALLERGIES

Unknown Allergies



SOCIAL HISTORY

No smoking Hx information available



PLAN OF CARE





VITAL SIGNS





MEDICATIONS

Unknown Medications



RESULTS

No Results



PROCEDURES

No Known procedures



IMMUNIZATIONS

No Known Immunizations

## 2018-09-02 NOTE — XMS REPORT
[facility]

 Created on: 10/13/2016



Sofiya Singh

External Reference #: [id number]

: 1955

Sex: Female



Demographics







 Address  [address] [place]  [address]

 

 Home Phone  [phone]

 

 Preferred Language  Unknown

 

 Marital Status  Unknown

 

 Mormon Affiliation  Unknown

 

 Race  White

 

 Ethnic Group  Not  or 





Author







 Author  NAHUN SNYDER

 

 Organization  [employer]

 

 Address  Unknown

 

 Phone  Unavailable







Care Team Providers







 Care Team Member Name  Role  Phone

 

 NAHUN SNYDER  CP  Unavailable



                                                                



Allergies

          No Known Allergies                                                   
                                     



Problems

          





 Problem Type  Condition  Code  Onset Dates  Condition Status

 

 Problem  Screening examination for pulmonary tuberculosis  V74.1     Active

 

 Problem  Pain in joint, pelvic region and thigh  719.45     Active

 

 Problem  Pneumonia, organism unspecified  486     Active

 

 Problem  Family history of ischemic heart disease  V17.3     Active

 

 Problem  Family history of other cardiovascular diseases  V17.49     Active

 

 Problem  Unspecified arthropathy, site unspecified  716.90     Active

 

 Problem  Anxiety state, unspecified  300.00     Active

 

 Problem  Lumbago  724.2     Active

 

 Problem  Family history of malignant neoplasm of breast  V16.3     Active

 

 Problem  Family history of diabetes mellitus  V18.0     Active



                                                                               
                                                                               
                    



Medications

          No Known Medications                                                 
                             



Results

          No Known Results                                                     
               



Summary Purpose

          [employer] Submission

## 2018-09-02 NOTE — XMS REPORT
[facility]

 Created on: 10/07/2015



Samantha Danykath

External Reference #: [id number]

: 1955

Sex: Female



Demographics







 Address  [address] [place]  [address]

 

 Home Phone  [phone]

 

 Preferred Language  Unknown

 

 Marital Status  Unknown

 

 Worship Affiliation  Unknown

 

 Race  White

 

 Ethnic Group  Not  or 





Author







 Author  NAHUN SNYDER

 

 Organization  [employer]

 

 Address  Unknown

 

 Phone  Unavailable







Care Team Providers







 Care Team Member Name  Role  Phone

 

 NAHUN SNYDER  CP  Unavailable



                                                                



Allergies

          No Known Allergies                                                   
                                     



Problems

          





 Problem Type  Condition  Code  Onset Dates  Condition Status

 

 Problem  Screening examination for pulmonary tuberculosis  V74.1     Active

 

 Problem  Pain in joint, pelvic region and thigh  719.45     Active

 

 Problem  Pneumonia, organism unspecified  486     Active

 

 Problem  Family history of ischemic heart disease  V17.3     Active

 

 Problem  Family history of other cardiovascular diseases  V17.49     Active

 

 Problem  Unspecified arthropathy, site unspecified  716.90     Active

 

 Problem  Anxiety state, unspecified  300.00     Active

 

 Problem  Lumbago  724.2     Active

 

 Problem  Family history of malignant neoplasm of breast  V16.3     Active

 

 Problem  Family history of diabetes mellitus  V18.0     Active



                                                                               
                                                                               
                    



Medications

          





 Medication  Code System  Code  Instructions  Start Date  End Date  Status  
Dosage

 

 Hydrocodone-Acetaminophen  NDC  [address]-35   MG    2015    
    take 1 tablet by oral route every 6 hours as needed for pain PRN

 

 Alprazolam  NDC  [address]-10  1 MG    2015        take 1 tablet (1 
mg) by oral route 3 times per day

 

 tramadol  NDC  0  50 mg    2015        take 1 tablet (50 mg) by oral 
route every 6 hours as needed



                                                                               
                   



Results

          No Known Results                                                     
               



Summary Purpose

          [employer] Submission

## 2018-09-02 NOTE — XMS REPORT
[facility]

 Created on: 2017



Sofiya Singh

External Reference #: [id number]

: 1955

Sex: Female



Demographics







 Address  [address] [place]  [address]

 

 Preferred Language  Unknown

 

 Marital Status  Unknown

 

 [employer] Affiliation  Unknown

 

 Race  Unknown

 

 Ethnic Group  Unknown





Author







 Author  NAHUN SNYDER

 

 Organization  [facility]

 

 Address  [address] [place]  [address]



 

 Phone  [phone]







Care Team Providers







 Care Team Member Name  Role  Phone

 

 NAHUN SNYDER  Unavailable  [phone]







PROBLEMS







 Type  Condition  ICD9-CM Code  WOB95-GT Code  Onset Dates  Condition Status  
SNOMED Code

 

 Problem  Pneumonia, organism unspecified  486        Active  581767435

 

 Problem  Lumbago  724.2        Active  289427835

 

 Problem  Pain in joint, pelvic region and thigh  719.45        Active  
132136788

 

 Problem  Screening examination for pulmonary tuberculosis  V74.1        Active
  632439780

 

 Problem  Unspecified arthropathy, site unspecified  716.90        Active  
679710458

 

 Problem  Family history of ischemic heart disease  V17.3        Active  
668178516

 

 Problem  Family history of diabetes mellitus  V18.0        Active  741853408

 

 Problem  Anxiety state, unspecified  300.00        Active  080777681

 

 Problem  Family history of other cardiovascular diseases  V17.49        Active
  771212614

 

 Problem  Family history of malignant neoplasm of breast  V16.3        Active  
076889604







ALLERGIES

Unknown Allergies



SOCIAL HISTORY

No smoking Hx information available



PLAN OF CARE





VITAL SIGNS





MEDICATIONS







 Medication  Instructions  Dosage  Frequency  Start Date  End Date  Duration  
Status

 

 Hydrocodone-Acetaminophen  MG     1 tablet as needed  6h  30 Mar, 2015  
      Active

 

 Alprazolam 1 MG     1 tablet  8h  30 Mar, 2015        Active







RESULTS

No Results



PROCEDURES

No Known procedures



IMMUNIZATIONS

No Known Immunizations

## 2018-09-02 NOTE — XMS REPORT
[facility]

 Created on: 2015



Sofiya Singh

External Reference #: [id number]

: 1955

Sex: Female



Demographics







 Address  [address] [place]  [address]

 

 Home Phone  [phone]

 

 Preferred Language  Unknown

 

 Marital Status  Unknown

 

 Protestant Affiliation  Unknown

 

 Race  White

 

 Ethnic Group  Not  or 





Author







 Author  NAHUN SNYDER

 

 Organization  [employer]

 

 Address  Unknown

 

 Phone  Unavailable







Care Team Providers







 Care Team Member Name  Role  Phone

 

 NAHUN SNYDER  CP  Unavailable



                                                                



Allergies

          No Known Allergies                                                   
                                     



Problems

          





 Problem Type  Condition  Code  Onset Dates  Condition Status

 

 Problem  Screening examination for pulmonary tuberculosis  V74.1     Active

 

 Problem  Pain in joint, pelvic region and thigh  719.45     Active

 

 Problem  Pneumonia, organism unspecified  486     Active

 

 Problem  Family history of ischemic heart disease  V17.3     Active

 

 Problem  Family history of other cardiovascular diseases  V17.49     Active

 

 Problem  Unspecified arthropathy, site unspecified  716.90     Active

 

 Problem  Anxiety state, unspecified  300.00     Active

 

 Problem  Lumbago  724.2     Active

 

 Problem  Family history of malignant neoplasm of breast  V16.3     Active

 

 Problem  Family history of diabetes mellitus  V18.0     Active



                                                                               
                                                                               
                    



Medications

          No Known Medications                                                 
                             



Results

          No Known Results                                                     
               



Summary Purpose

          [employer] Submission

## 2018-09-02 NOTE — XMS REPORT
[facility]

 Created on: 2018



Sofiya Singh

External Reference #: [id number]

: 1955

Sex: Female



Demographics







 Address  [address] [place]  [address]

 

 Preferred Language  Unknown

 

 Marital Status  Unknown

 

 [employer] Affiliation  Unknown

 

 Race  Unknown

 

 Ethnic Group  Unknown





Author







 Author  NAHUN SNYDER

 

 Organization  [facility]

 

 Address  [address] [place]  [address]



 

 Phone  [phone]







Care Team Providers







 Care Team Member Name  Role  Phone

 

 NAHUN SNYDER  Unavailable  [phone]







PROBLEMS







 Type  Condition  ICD9-CM Code  QTU96-LH Code  Onset Dates  Condition Status  
SNOMED Code

 

 Problem  ADHD (attention deficit hyperactivity disorder), inattentive type     
F90.0     Active  74998806

 

 Problem  Arthritis     M19.90     Active  7640735

 

 Problem  Positive skin test for tuberculosis     R76.11     Active  801015137

 

 Problem  Lumbago with sciatica, left side     M54.42     Active  752472439

 

 Problem  Excessive daytime sleepiness     G47.19     Active  052071264584

 

 Problem  Primary narcolepsy without cataplexy     G47.419     Active  
[id number]

 

 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 
    Active  [id number]







ALLERGIES

No Information



ENCOUNTERS







 Encounter  Location  Date  Diagnosis

 

 [place] [address] [address]-
5878  14 Mar, 2018  ADHD (attention deficit hyperactivity disorder), 
inattentive type F90.0 ; Lumbago with sciatica, left side M54.42 and Arthritis 
M19.90

 

 [place] [id number] [place] [id number] [place] [address] 75678-
2439    ADHD (attention deficit hyperactivity disorder), 
inattentive type F90.0 and Lumbar neuritis M54.16

 

 [place] [address] [place] [id number] [place] [address] 81909-
3316     

 

 [place] [address] [address]-
3197  16 2018  Lumbar neuritis M54.16

 

 [place] [address] [place] [id number] [place] [address] 83642-
3024    Low back pain M54.5

 

 [facility]  [address] [place] [address]-
9478    ADHD (attention deficit hyperactivity disorder), 
inattentive type F90.0

 

 [facility]  [address] [id number] [place] [address] 78133-
3307    Positive skin test for tuberculosis R76.11

 

 [facility]  [id number] [place] [address] 09912-
6262    Positive skin test for tuberculosis R76.11

 

 [facility]  [address] [id number] [place] [address] 32643-
3194     

 

 [facility]  [address] [id number] [place] [address] 49916-
3522    Lumbar neuritis M54.16

 

 [facility]  [address] [id number] [place] [address] 59212-
0795    ADHD (attention deficit hyperactivity disorder), 
inattentive type F90.0

 

 [facility]  [address] [id number] [place] [address] 42654-
8092     

 

 [facility]  [address] [id number] [place] [address] 20750-
5138  20 Dec, 2017  Lumbar neuritis M54.16

 

 [facility]  [address] [id number] [place] [address] 13515-
4485  15 Dec, 2017  ADHD (attention deficit hyperactivity disorder), 
inattentive type F90.0

 

 [facility]  [address] [place] [address] 92832-
6938  12 Dec, 2017   

 

 [facility]  [address] [place] [id number] [place] [address] 09769-
0332  11 Dec, 2017   

 

 [facility]  [address] [place] [id number] [place] [address] 37625-
4971    Lumbar neuritis M54.16

 

 [facility]  [address] [place] [id number] [place] [address] 39922-
4272    ADHD (attention deficit hyperactivity disorder), 
inattentive type F90.0 and Primary narcolepsy without cataplexy G47.419

 

 [facility]  [address] [place] [id number] [place] [address] 69129-
5405  10 Nov, 2017   

 

 [facility]  [id number] [place] [address] 09757-
4848     

 

 [facility]  [address] [place] [id number] [place] [address] 28889-
7985  23 Oct, 2017  Lumbar neuritis M54.16 and ADHD (attention deficit 
hyperactivity disorder), inattentive type F90.0

 

 [facility]  [address] [place] [id number] [place] [address] 11671-
0949  12 Oct, 2017   

 

 [facility]  [address] [id number] [place] [address] 80040-
2272  26 Sep, 2017  Lumbar neuritis M54.16 and ADHD (attention deficit 
hyperactivity disorder), inattentive type F90.0

 

 [facility]  [address] [place] [id number] [place] [address] 14220-
5869  19 Sep, 2017   

 

 [facility]  [address] [place] [id number] [place] [address] 49042-
5665  31 Aug, 2017  ADHD (attention deficit hyperactivity disorder), 
inattentive type F90.0 and Lumbar neuritis M54.16

 

 [facility]  [address] [place] [id number] [place] [address] 57760-
3229  24 Aug, 2017  Lumbar neuritis M54.16

 

 [facility]  [address] [place] [id number] [place] [address] 33406-
2689  23 Aug, 2017   

 

 [facility]  [address] [place] [id number] [place] [address] 20064-
2677  02 Aug, 2017  ADHD (attention deficit hyperactivity disorder), 
inattentive type F90.0 and Lumbar neuritis M54.16

 

 [facility]  [address] [place] [id number] [place] [address] 02805-
1069  02 Aug, 2017   

 

 [facility]  [address] [place] [address] [address]-
5410     

 

 [facility]  [address] [address]-
7831     

 

 [facility]  [address] [address]-
7284     

 

 [facility]  [address] [place] [id number] [place] [address] 81525-
4620     

 

 [facility]  [address] [place] [id number] [place] [address] 99254-
8074    ADHD (attention deficit hyperactivity disorder), 
inattentive type F90.0 and Lumbar neuritis M54.16

 

 [facility]  [address]65100[place] [address]-
2793     

 

 [facility]  [address] [place] [id number] [place] [address] 76815-
2788     

 

 [facility]  [address] [place] [address] 06289-
8809    Lumbar neuritis M54.16 and ADHD (attention deficit 
hyperactivity disorder), inattentive type F90.0

 

 [facility]  [address]100[place] [address]-
2764     

 

 [facility]  [address]65100[place] [address]-
4745  10 May, 2017  Lumbar neuritis M54.16 and ADHD (attention deficit 
hyperactivity disorder), inattentive type F90.0

 

 [facility]  [address] [address]-
2890  03 May, 2017   

 

 [facility]  [address] [address]-
4788  01 May, 2017   

 

 [facility]  [address] [address]-
5280    Narcolepsy due to underlying condition without cataplexy 
G47.429 and Lumbago with sciatica, left side M54.42

 

 [facility] [place] [facility]  [address] [address]-
0158  14 2017  Lumbar neuritis M54.16 and ADHD (attention deficit 
hyperactivity disorder), inattentive type F90.0

 

 [facility]  [address] [place] [address] [address]-
0016  31 Mar, 2017   

 

 [facility]  [address] [place] [id number] [place] [address] 44968-
9208  15 Mar, 2017  Lumbar neuritis M54.16 and ADHD (attention deficit 
hyperactivity disorder), inattentive type F90.0

 

 [facility]  [address] [place] [id number] [place] [address] 28083-
5347  15 Mar, 2017   

 

 [facility]  [address] [place] [id number] [place] [address] 46151-
7016  06 Mar, 2017   

 

 [facility]  [address] [place] [id number] [place] [address] 80490-
0706  15 2017  ADHD (attention deficit hyperactivity disorder), 
inattentive type F90.0

 

 [facility]  [address] [place] [address] 89785-
7750  15 2017  Lumbar neuritis M54.16

 

 [facility]  [address] [place] [address]-
7543  08 2017   

 

 [facility]  [address] [place] [id number] [place] [address] 61324-
9072     

 

 [facility]  [address] [place] [id number] [place] [address] 37096-
4672    Attention deficit hyperactivity disorder (ADHD), 
predominantly inattentive type F90.0

 

 [facility]  [address] [place] [id number] [place] [address] 86931-
9688     

 

 [facility]  [address] [address]-
7207  10 Niall, 2017   

 

 [facility]  [address] [place] [id number] [place] [address] 82568-
9120     

 

 [facility]  [address] [place] [address] 06175-
3400  22 Dec, 2016  Attention deficit hyperactivity disorder (ADHD), 
predominantly inattentive type F90.0

 

 [facility]  [address] [place] [id number] [place] [address] 75263-
9875  12 Dec, 2016   

 

 [facility]  [address] [place] [id number] [place] [address] 21182-
6132  07 Dec, 2016   

 

 [facility]  [address] [place] [id number] [place] [address] 03023-
8337  05 Dec, 2016   

 

 [facility]  [address] [place] [id number] [place] [address] 08900-
9472  05 Dec, 2016  Low TSH level R94.6

 

 [facility]  [address] [place] [id number] [place] [address] 47943-
7552  05 Dec, 2016   

 

 [facility]  [address] [place] [id number] [place] [address] 14922-
0735  02 Dec, 2016   

 

 [facility]  [address] [place] [id number] [place] [address] 63962-
9727  10 Nov, 2016   

 

 [facility]  [address] [place] [id number] [place] [address] 99773-
7125  10 Nov, 2016   

 

 [facility]  [address] [place] [id number] [place] [address] 57176-
2648     

 

 [facility]  [address] [place] [address] [place] [address] 96077-
8952  18 Oct, 2016  Low TSH level R94.6

 

 [facility]  [address] [place] [id number] [place] [address] 59973-
0048  17 Oct, 2016  Excessive daytime sleepiness G47.19 and ADHD (attention 
deficit hyperactivity disorder), inattentive type F90.0

 

 [facility]  [address] [place] [address] 27748-
0098  13 Oct, 2016   

 

 [facility]  [address] [place] [id number] [place] [address] 76781-
1004  12 Oct, 2016   

 

 [facility]  [address] [facility] [id number] [place] [address]-
4194  03 Oct, 2016   

 

 [facility]  [address] [facility] [id number] [place] [address], [place] [address]-
0545  28 Sep, 2016   

 

 [facility]  [address] [facility] [id number] [place] [address]-
1385  14 Sep, 2016   

 

 [facility]  [address] [facility] [id number] [place] [address], [place] [address]-
9528  01 Sep, 2016   

 

 [facility]  [address] [facility] [id number] [place] [address], [place] [address]-
2978  17 Aug, 2016   

 

 [facility]  [address] [facility] [id number] [place] [address], [place] [address]-
5621  04 Aug, 2016   

 

 [facility]  [address] [facility] [id number] [place] [address], [place] [address]-
4306  03 Aug, 2016   

 

 [facility]  [address] [place] [id number] [place] [address] 14037-
9614    Lumbar neuritis M54.16

 

 [facility]  [address] [place] [id number] [address]-
7021     

 

 [facility]  [address] [place] [id number] [address]-
6800     

 

 [facility]  [address]65100[place] [address]-
9108    Lumbar neuritis M54.16

 

 [facility]  [address] [facility] [id number] [address]-
3784     

 

 [facility]  [address] [place] [id number] [address]-
9049     

 

 [facility] [place] [facility]  [address] [facility] [id number] [address]-
9871  26 May, 2016  Lumbar neuritis M54.16

 

 [facility]  [address] [place] [id number] [address]-
0994  25 May, 2016   

 

 [facility]  [address] [place] [address] 44765-
3325  10 May, 2016   

 

 [facility]  [address] [place] [id number] [place] [address] 07677-
6659    Lumbar neuritis M54.16

 

 [facility]  [address] [place] [address] [place] [address] 09193-
6516     

 

 [facility]  [address] [place] [id number] [place] [address] 28742-
2544     

 

 [facility]  [address] [place] [id number] [place] [address] 98048-
6572  23 Mar, 2016   

 

 [facility]  [address] [place] [id number] [place] [address] 86518-
8098  14 Mar, 2016   

 

 [facility]  [address] [place] [id number] [place] [address] 62351-
8796  14 Mar, 2016   

 

 [facility]  [address] [place] [id number] [place] [address] 98838-
8367  11 Mar, 2016   

 

 [facility]  [address] [place] [id number] [place] [address] 76595-
8199  24 2016   

 

 [facility]  [address] [place] [address] [address]-
8067    Inguinal hernia, right K40.90

 

 [facility]  [address] [place] [address] [place] [address] 99914-
9898     

 

 [facility]  [address] [place] [address] 91054-
9618  15 2016  Low back pain M54.5 and Sciatica, unspecified side M54.30

 

 [facility]  [address] [place] [address] [address]-
6865     

 

 [facility]  [address] [place] [address] [place] [address] 89270-
6668     

 

 [facility]  [address] [facility] [id number] [place] [address]-
3148  30 Dec, 2015   

 

 [facility]  [address] [place] [id number] [place] [address], [place] [address]-
3704  28 Dec, 2015   

 

 [facility] [place] [facility]  [address] [facility] [id number] [place] [address]-
3102  02 Dec, 2015   

 

 [facility] [place] [facility]  [address] [facility] [id number] [place] [address], [place] [address]-
4075     

 

 [facility] [place] [facility]  [address] [facility] [id number] [place] [address]-
0174     

 

 [facility] [place] [facility]  [address] [place] [id number] [place] [address], [place] [address]-
4227     

 

 [facility] [place] [facility]  [address] [place] [id number] [place] [address], [place] [address]-
0537     

 

 [facility] [place] [facility]  [address] [place] [id number] [place] [address], [place] [address]-
7359  26 Oct, 2015   

 

 [facility]  [address] [place] [id number] [place] [address] 04619-
9486  22 Oct, 2015  Encounter for immunization Z23

 

 [facility]  [address] [place] [address] 80033-
2686  07 Oct, 2015   

 

 [facility]  [address]100[place] [address]-
0648  05 Oct, 2015   

 

 [facility] [place] [facility]  [address]00565100[place] [address]-
6986  09 Sep, 2015   

 

 [facility] [place] [facility]  [address] [place] [id number] [address]-
8197  08 Sep, 2015   

 

 [facility] [place] [facility]  [address] [id number] [address]-
5066  19 Aug, 2015  Lumbago 724.2

 

 [facility] [place] [facility]  [address] [place] [address]-
8067  12 Aug, 2015   

 

 [facility] [place] [facility]  [address] [id number] [place] [address]-
0429    Lumbago 724.2

 

 [facility] [place] [facility]  [address] [id number] [place] [address]-
2739  17 2015   

 

 [facility] [place] [facility]  [address] [id number] [place] [address]-
6893     

 

 [facility] [place] [facility]  [address] [id number] [place] [address]-
3171     

 

 [facility] [place] [facility]  [address] [id number] [place] [address]-
0042     

 

 [facility] [place] [facility]  [address] [id number] [place] [address]-
6917     

 

 [facility] [place] [facility]  [address] [facility] [id number] [place] [address]-
0783  22 May, 2015   

 

 [facility] [place] [facility]  [address] [facility] [id number] [place] [address]-
8515     

 

 [facility] [place] [facility]  [address] [facility] [id number] [place] [address]-
7571     

 

 [facility] [place] [facility]  [address] [facility] [id number] [place] [address]-
7172  30 Mar, 2015   

 

 [facility] [place] [facility]  [address] [facility] [id number] [place] [address]-
6083  30 Mar, 2015   

 

 [facility] [place] [facility]  [address] [facility] [id number] [place] [address]-
2546  02 Mar, 2015   

 

 [facility] [place] [facility]  [address] [facility] [id number] [place] [address]-
2876  02 Mar, 2015   

 

 [facility] [place] [facility]  [address] [facility] [id number] [place] [address]-
3096     

 

 [facility] [place] [facility]  [address] [facility] [id number] [place] [address]-
2546     

 

 [facility] [place] [facility]  [address] [facility] [id number] [place] [address]-
0041     

 

 [facility] [place] [facility]  [address] [id number] [place] [address]-
2931     

 

 [facility] [place] [facility]  [address] [id number] [place] [address]-
2203     

 

 [facility] [place] [facility]  [address] [facility] [id number] [place] [address]-
6371     

 

 [facility] [place] [facility]  [address] [id number] [place] [address]-
9383  31 Dec, 2014   

 

 [facility] [place] [facility]  [address] [id number] [place]
0342  31 Dec, 2014   

 

 [facility] [place] [facility]  [address] [facility] [id number] [place] [address]-
2774  22 Dec, 2014   

 

 [facility] [place] [facility]  [address] [facility] [id number] [place] [address]-
5249  22 Dec, 2014   

 

 [facility] [place] [facility]  [address] [id number] [place] [address]-
0674  08 Dec, 2014   

 

 [facility] [place] [facility]  [address] [facility] [id number] [place] [address]-
1362  08 Dec, 2014   

 

 [facility] [place] [facility]  [address] [facility] [id number] [place] [address]-
1840  10 Nov, 2014   

 

 [facility] [place] [facility]  [address] [facility] [id number] [address]-
7074  10 Nov, 2014   

 

 [facility] [place] [facility]  [address] [id number] [address]-
9368  13 Oct, 2014   

 

 [facility] [place] [facility]  [address] [id number] [place] [address]-
9074  13 Oct, 2014   

 

 [facility] [place] [facility]  [address] [facility] [id number] [address]-
8555  01 Oct, 2014   

 

 [facility] [place] [facility]  [address] [facility] [id number] [place] [address]-
6527  01 Oct, 2014   

 

 [facility] [place] [facility]  [address] [id number] [place] [address]-
1925  15 Sep, 2014   

 

 [facility]  [address] [id number] [place] [address]-
7539  15 Sep, 2014   

 

 [facility] [place] [facility]  [address] [id number] [place] [address]-
7391  18 Aug, 2014   

 

 [facility] [place] [facility]  [address] [id number] [place] [address]-
1610  18 Aug, 2014   

 

 [facility] [place] [facility]  [address] [id number] [place] [address]-
5369  18 Aug, 2014   

 

 [facility] [place] [facility]  [address] [id number] [place] [address]-
9545  18 Aug, 2014   

 

 [facility] [place] [facility]  [address] [id number] [place] [address]-
8695     

 

 [facility] [place] [facility]  [address] [id number] [place] [address]-
9907     

 

 [facility]  [address] [id number] [place] [address]-
3267     

 

 [facility] [place] [facility]  [address] [id number] [place] [address]-
7462     

 

 [facility]  [address] [id number] [place] [address]-
9499     

 

 [facility] [place] [facility]  [address] [id number] [place] [address]-
8397     

 

 [facility] [place] [facility]  [address] [id number] [place] [address]-
6820  30 May, 2014   

 

 [facility] [place] [facility]  [address] [id number] [place] [address]-
3488  28 May, 2014   

 

 [facility] [place] [facility]  [address] [id number] [place] [address]-
7119  28 May, 2014   

 

 [facility] [place] [facility]  [address] [id number] [place] [address]-
2483     

 

 [facility] [place] [facility]  [address] [id number] [place] [address]-
0119  30 2014   

 

 [facility] [place] [facility]  [address] [id number] [place] [address]-
5882     

 

 [facility] [place] [facility]  [address] [id number] [place] [address]-
2485     

 

 [facility] [place] [facility]  [address] [id number] [place] [address]-
7219     

 

 [facility] [place] [facility]  [address] [id number] [place] [address]-
6755     

 

 [facility] [place] [facility]  [address] [id number] [place] [address]-
5[address]     

 

 [facility] [place] [facility]  [address] [id number] [place] [address]-
4979     

 

 [facility] [place] [facility]  [address] [id number] [place] [address]-
5323     

 

 [facility] [place] [facility]  [address] [id number] [place] [address]-
4133     

 

 [facility] [place] [facility]  [address] [id number] [place] [address]-
1648     

 

 [facility] [place] [facility]  [address] [id number] [place] [address]-
4085  28 Mar, 2014   

 

 [facility] [place] [facility]  [address] [id number] [place] [address]-
5044  27 Mar, 2014   

 

 [facility] [place] [facility]  [address] [id number] [place] [address]-
5835  27 Mar, 2014   

 

 [facility] [place] [facility]  [address] [id number] [place] [address]-
6641  26 Mar, 2014   

 

 [facility] [place] [facility]  [address] [id number] [place] [address]-
9014  26 Mar, 2014   

 

 [facility] [place] [facility]  [address] [id number] [place] [address]-
6421     

 

 [facility] [place] [facility]  [address] [id number] [place] [address]-
8426     

 

 [facility] [place] [facility]  [address] [id number] [place]-
6516     

 

 [facility] [place] [facility]  [address] [id number] [place] [address]-
3186     

 

 [facility] [place] [facility]  [address] [id number] [place] [address]-
0105     

 

 [facility] [place] [facility]  [address] [id number] [place] [address]-
6396     

 

 [facility] [place] [facility]  [address] [id number] [place] [address]-
4160     

 

 [facility] [place] [facility]  [address] [id number] [place] [address]-
0511     

 

 [facility] [place] [facility]  [address] [id number] [place] [address]-
5982     

 

 [facility] [place] [facility]  [address] [id number] [place] [address]-
2518     

 

 [facility] [place] [facility]  [address] [id number] [place] [address]-
5744     

 

 [facility] [place] [facility]  [address] [id number] [place] [address]-
4386     

 

 [facility] [place] [facility]  [address] [id number] [place] [address]-
9618     

 

 [facility] [place] [facility]  [address] [id number] [place] [address]-
9638  10 Dec, 2013   

 

 [facility] [place] [facility]  [address] [id number] [place] [address]-
2143  10 Dec, 2013   

 

 [facility] [place] [facility]  [address] [id number] [place] [address]-
2939     

 

 [facility]  [address] [facility] [id number] [place] [address]-
8528     







IMMUNIZATIONS

No Known Immunizations



SOCIAL HISTORY

Never Assessed



REASON FOR VISIT

Refill request



PLAN OF CARE





VITAL SIGNS





MEDICATIONS

No Known Medications



RESULTS

No Results



PROCEDURES

No Known procedures



INSTRUCTIONS





MEDICATIONS ADMINISTERED

No Known Medications



MEDICAL (GENERAL) HISTORY







 Type  Description  Date

 

 Medical History  narcolepsy   

 

 Surgical History  Gallbladder removed  2015

 

 Surgical History  Hernia repair  2016

## 2018-09-02 NOTE — XMS REPORT
[facility]

 Created on: 2018



Sofiya Singh

External Reference #: [id number]

: 1955

Sex: Female



Demographics







 Address  [address] [place]  [address]

 

 Preferred Language  Unknown

 

 Marital Status  Unknown

 

 Sikh Affiliation  Unknown

 

 Race  Unknown

 

 Ethnic Group  Unknown





Author







 Author  NAHUN SNYDER

 

 Organization  [facility]

 

 Address  [address] [place]  [address]



 

 Phone  [phone]







Care Team Providers







 Care Team Member Name  Role  Phone

 

 NAHUN SNYDER  Unavailable  [phone]







PROBLEMS







 Type  Condition  ICD9-CM Code  GAB20-UT Code  Onset Dates  Condition Status  
SNOMED Code

 

 Problem  ADHD (attention deficit hyperactivity disorder), inattentive type     
F90.0     Active  60153558

 

 Problem  Arthritis     M19.90     Active  2535408

 

 Problem  Positive skin test for tuberculosis     R76.11     Active  844065162

 

 Problem  Lumbago with sciatica, left side     M54.42     Active  114248609

 

 Problem  Excessive daytime sleepiness     G47.19     Active  530492990130

 

 Problem  Primary narcolepsy without cataplexy     G47.419     Active  
98808656246013

 

 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 
    Active  [id number]







ALLERGIES

No Information



ENCOUNTERS







 Encounter  Location  Date  Diagnosis

 

 [place] [id number] [place] [address] 62198-
6995     

 

 [place] [id number] [place] [address] 49161-
4797    Right lower quadrant abdominal pain R10.31 and Rash R21

 

 [place] [id number] [place] [address] 47881-
6219  14 Mar, 2018  ADHD (attention deficit hyperactivity disorder), 
inattentive type F90.0 ; Lumbago with sciatica, left side M54.42 and Arthritis 
M19.90

 

 [place] [address] [address]-
4392    ADHD (attention deficit hyperactivity disorder), 
inattentive type F90.0 and Lumbar neuritis M54.16

 

 [place] [id number] [place] [address] 12560-
8875     

 

 [facility]  [address] [place] [address] 87111-
3374    Lumbar neuritis M54.16

 

 [facility]  [id number] [place] [address] 16868-
1326  16 2018  Low back pain M54.5

 

 [facility]  [id number] [place] [address] 25299-
1377    ADHD (attention deficit hyperactivity disorder), 
inattentive type F90.0

 

 [facility]  [id number] [place] [address] 84481-
7991    Positive skin test for tuberculosis R76.11

 

 [facility]  [id number] [place] [address] 49098-
3678    Positive skin test for tuberculosis R76.11

 

 [facility]  [id number] [place] [address] 46018-
3608     

 

 [facility]  [id number] [place] [address] 71439-
8747    Lumbar neuritis M54.16

 

 [facility]  [id number] [place] [address] 30874-
0464    ADHD (attention deficit hyperactivity disorder), 
inattentive type F90.0

 

 [facility]  [id number] [place] [address] 21846-
4682     

 

 [facility]  [id number] [place] [address] 40944-
8053  20 Dec, 2017  Lumbar neuritis M54.16

 

 [facility]  [id number] [place] [address] 10853-
6953  15 Dec, 2017  ADHD (attention deficit hyperactivity disorder), 
inattentive type F90.0

 

 [facility]  [id number] [place] [address] 95405-
6303  12 Dec, 2017   

 

 [facility]  [id number] [place] [address] 41312-
2821  11 Dec, 2017   

 

 [facility]  [address] [place] [address] 86683-
6349    Lumbar neuritis M54.16

 

 [facility]  [id number] [place] [address] 48939-
5169    ADHD (attention deficit hyperactivity disorder), 
inattentive type F90.0 and Primary narcolepsy without cataplexy G47.419

 

 [facility]  [id number] [place] [address] 79869-
9879  10 Nov, 2017   

 

 [facility]  [id number] [place] [address] 76350-
7211     

 

 [facility]  [id number] [place] [address] 43818-
0490  23 Oct, 2017  Lumbar neuritis M54.16 and ADHD (attention deficit 
hyperactivity disorder), inattentive type F90.0

 

 [facility]  [id number] [place] [address] 05102-
8607  12 Oct, 2017   

 

 [facility]  [id number] [place] [address] 31219-
3068  26 Sep, 2017  Lumbar neuritis M54.16 and ADHD (attention deficit 
hyperactivity disorder), inattentive type F90.0

 

 [facility]  [id number] [place] [address] 74177-
0169  19 Sep, 2017   

 

 [facility]  [id number] [place] [address] 45014-
3163  31 Aug, 2017  ADHD (attention deficit hyperactivity disorder), 
inattentive type F90.0 and Lumbar neuritis M54.16

 

 [facility]  [address] [place] [address] 96098-
4210  24 Aug, 2017  Lumbar neuritis M54.16

 

 [facility]  [id number] [place] [address] 42416-
0167  23 Aug, 2017   

 

 [facility]  [id number] [place] [address] 48535-
5199  02 Aug, 2017  ADHD (attention deficit hyperactivity disorder), 
inattentive type F90.0 and Lumbar neuritis M54.16

 

 [facility]  [address] [address]-
1684  02 Aug, 2017   

 

 [facility]  [id number] [address]-
8438     

 

 [facility]  [id number] [place] [address] 04719-
3067     

 

 [facility]  [id number] [place] [address] 88951-
3676     

 

 [facility]  [id number] [place] [address] 26366-
8382     

 

 [facility]  [id number] [place] [address] 63024-
9558    ADHD (attention deficit hyperactivity disorder), 
inattentive type F90.0 and Lumbar neuritis M54.16

 

 [facility]  [address] [address]-
0116     

 

 [facility]  [address] [place] [address] 56325-
9657     

 

 [facility]  [address] [place] [address] 69136-
0673    Lumbar neuritis M54.16 and ADHD (attention deficit 
hyperactivity disorder), inattentive type F90.0

 

 [facility]  [address] [address]-
7129     

 

 [facility]  [id number] [place] [address] 03566-
5838  10 May, 2017  Lumbar neuritis M54.16 and ADHD (attention deficit 
hyperactivity disorder), inattentive type F90.0

 

 [facility]  [address] [address]-
0976  03 May, 2017   

 

 [facility]  [address] [place] [address] 19379-
8132  01 May, 2017   

 

 [facility]  [id number] [place] [address] 03603-
3303    Narcolepsy due to underlying condition without cataplexy 
G47.429 and Lumbago with sciatica, left side M54.42

 

 [facility]  [id number] [place] [address] 58433-
7641    Lumbar neuritis M54.16 and ADHD (attention deficit 
hyperactivity disorder), inattentive type F90.0

 

 [facility]  [address] [place] [address] 52737-
4996  31 Mar, 2017   

 

 [facility]  [address] [place] [address] 51309-
6108  15 Mar, 2017  Lumbar neuritis M54.16 and ADHD (attention deficit 
hyperactivity disorder), inattentive type F90.0

 

 [facility]  [address] [place] [address] 32435-
6429  15 Mar, 2017   

 

 [facility]  [address] [place] [address] 44120-
5877  06 Mar, 2017   

 

 [facility]  [address] [place] [address] 32100-
2132  15 Feb, 2017  ADHD (attention deficit hyperactivity disorder), 
inattentive type F90.0

 

 [facility]  [address] [place] [address] 24941-
5893  15 Feb, 2017  Lumbar neuritis M54.16

 

 [facility]  [address] [place] [address] 24531-
4755     

 

 [facility]  [address] [place] [address] 81630-
1539     

 

 [facility]  [address] [place] [address] 52667-
3331    Attention deficit hyperactivity disorder (ADHD), 
predominantly inattentive type F90.0

 

 [facility]  [id number] [address]-
6058     

 

 [facility]  [address] [place] [address] 79715-
9264  10 Niall, 2017   

 

 [facility]  [address] [place] [address] 91524-
4613     

 

 [facility]  [address] [place] [address] 45316-
4730  22 Dec, 2016  Attention deficit hyperactivity disorder (ADHD), 
predominantly inattentive type F90.0

 

 [facility]  [address] [place] [address] 83244-
4720  12 Dec, 2016   

 

 [facility]  [address] [place] [address] 92220-
8525  07 Dec, 2016   

 

 [facility]  [address] [place] [address] 86542-
2582  05 Dec, 2016   

 

 [facility]  [address] [place] [address] 00197-
1387  05 Dec, 2016  Low TSH level R94.6

 

 [facility]  [address] [place] [address] 74719-
0558  05 Dec, 2016   

 

 [facility]  [address] [place] [address] 32387-
0143  02 Dec, 2016   

 

 [facility]  [address] [place] [address] 16659-
7238  10 Nov, 2016   

 

 [facility]  [address] [place] [address] 06106-
1495  10 Nov, 2016   

 

 [facility]  [address] [place] [address] 67700-
4171     

 

 [facility]  [address] [place] [address] 46550-
0426  18 Oct, 2016  Low TSH level R94.6

 

 [facility]  [address] [place] [address] 23966-
0323  17 Oct, 2016  Excessive daytime sleepiness G47.19 and ADHD (attention 
deficit hyperactivity disorder), inattentive type F90.0

 

 [facility]  [address] [place] [address] 94960-
6646  13 Oct, 2016   

 

 [facility]  [address] [place] [address] 70597-
8390  12 Oct, 2016   

 

 [facility]  [address] [place] [address] 22856-
3562  03 Oct, 2016   

 

 [facility]  [address] [place] [address] 47938-
5633  28 Sep, 2016   

 

 [facility]  [address] [place] [address] 16119-
6973  14 Sep, 2016   

 

 [facility]  [address] [place] [address] 01442-
6216  01 Sep, 2016   

 

 [facility]  [address] [place] [address] 80543-
5495  17 Aug, 2016   

 

 [facility]  [address] [place] [address] 51579-
7182  04 Aug, 2016   

 

 [facility]  [address] [place] [address] 67134-
2826  03 Aug, 2016   

 

 [facility]  [address] [place] [address] 41997-
4057    Lumbar neuritis M54.16

 

 [facility]  [address] [place] [address] 71537-
0449     

 

 [facility]  [address] [place] [address] 15892-
6134     

 

 [facility]  [address] [place] [address] 53641-
6050    Lumbar neuritis M54.16

 

 [facility]  [address] [place] [address] 91296-
0547     

 

 [facility]  [address] [place] [address] 16710-
3910     

 

 [facility]  [address] [address]-
6224  26 May, 2016  Lumbar neuritis M54.16

 

 [facility]  [address] [address]-
7382  25 May, 2016   

 

 [facility]  [address] [place] [address] 04361-
2192  10 May, 2016   

 

 [facility]  [address] [place] [address] 96539-
1787    Lumbar neuritis M54.16

 

 [facility]  [address] [place] [address] 63428-
6844     

 

 [facility]  [address] [place] [address] 24474-
7071     

 

 [facility]  [address] [place] [address] 85171-
6414  23 Mar, 2016   

 

 [facility]  [address] [address]-
3710  14 Mar, 2016   

 

 [facility]  [address] [address]-
2473  14 Mar, 2016   

 

 [facility]  [address] [address]-
4507  11 Mar, 2016   

 

 [facility]  [address] [address]-
1962  24 2016   

 

 [facility]  [address] [address]-
1730    Inguinal hernia, right K40.90

 

 [facility]  [address] [address]-
3474  17 2016   

 

 [facility]  [address] [address]-
0244  15 2016  Low back pain M54.5 and Sciatica, unspecified side M54.30

 

 [facility]  [address] [facility] [id number] [address]-
8257     

 

 [facility]  [address] [facility] [id number] [address]-
8952     

 

 [facility]  [address] [facility] [id number] [address]-
9787  30 Dec, 2015   

 

 [facility]  [address] [facility] [id number] [place] [address] 59465-
3432  28 Dec, 2015   

 

 [facility]  [address] [facility] [id number] [place] [address]-
2050  02 Dec, 2015   

 

 [facility]  [address] [place] [address] 70321-
0199     

 

 [facility]  [address] [address]-
6091     

 

 [facility]  [address] [place] [address] 15684-
0722     

 

 [facility]  [id number], [place] [address]-
5131     

 

 [facility]  [address] [address]-
5812  26 Oct, 2015   

 

 [facility]  [address] [address]-
4569  22 Oct, 2015  Encounter for immunization Z23

 

 [facility]  [address] [facility] [id number] [address]-
6495  07 Oct, 2015   

 

 [facility]  [address] [facility] [id number] [address]-
6091  05 Oct, 2015   

 

 [facility]  [id number] [address]-
0636  09 Sep, 2015   

 

 [facility]  [id number] [address]-
8624  08 Sep, 2015   

 

 [facility]  [address] [place] [address]-
4180  19 Aug, 2015  Lumbago 724.2

 

 [facility] [place] [facility]  [address] [id number] [place]
2377  12 Aug, 2015   

 

 [facility] [place] [facility]  [address] [id number] [place] [address]-
1598    Lumbago 724.2

 

 [facility] [place] [facility]  [address] [id number] [place] [address]-
6663     

 

 [facility] [place] [facility]  [address] [id number] [place] [address]-
9741     

 

 [facility] [place] [facility]  [address] [id number] [place] [address]-
0919     

 

 [facility] [place] [facility]  [address] [id number] [place] [address]-
6158     

 

 [facility] [place] [facility]  [address] [id number] [place] [address]-
8195     

 

 [facility] [place] [facility]  [address] [id number] [place] [address]-
4430  22 May, 2015   

 

 [facility] [place] [facility]  [address] [id number] [place] [address]-
9297     

 

 [facility] [place] [facility]  [address] [id number] [place] [address]-
6913     

 

 [facility] [place] [facility]  [address] [id number] [place] [address]-
5761  30 Mar, 2015   

 

 [facility] [place] [facility]  [address] [id number] [place] [address]-
5224  30 Mar, 2015   

 

 [facility] [place] [facility]  [address] [id number] [place] [address]-
2292  02 Mar, 2015   

 

 [facility] [place] [facility]  [address] [id number] [place] [address]-
7373  02 Mar, 2015   

 

 [facility] [place] [facility]  [address] [id number] [place] [address]-
6448  ,    

 

 [facility] [place] [facility]  [address] [id number] [place] [address]-
7326  ,    

 

 [facility] [place] [facility]  [address] [id number] [place] [address]-
3776     

 

 [facility] [place] [facility]  [address] [id number] [place] [address]-
3496     

 

 [facility] [place] [facility]  [address] [id number] [place] [address]-
6704     

 

 [facility] [place] [facility]  [address] [id number] [place] [address]-
1826     

 

 [facility] [place] [facility]  [address] [id number] [place] [address]-
8995  31 Dec, 2014   

 

 [facility] [place] [facility]  [address] [id number] [place] [address]-
1780  31 Dec, 2014   

 

 [facility] [place] [facility]  [address] [id number] [place] [address]-
4648  22 Dec, 2014   

 

 [facility] [place] [facility]  [address] [id number] [place] [address]-
6284  22 Dec, 2014   

 

 [facility] [place] [facility]  [address] [facility] [id number] [place] [address]-
2666  08 Dec, 2014   

 

 [facility] [place] [facility]  [address] [id number] [place] [address]-
0283  08 Dec, 2014   

 

 [facility] [place] [facility]  [address] [id number] [place] [address]-
1296  10 Nov, 2014   

 

 [facility] [place] [facility]  [address] [id number] [place]-
7548  10 Nov, 2014   

 

 [facility] [place] [facility]  [address] [id number] [place]-
8669  13 Oct, 2014   

 

 [facility] [place] [facility]  [address] [id number] [place]-
3276  13 Oct, 2014   

 

 [facility] [place] [facility]  [address] [id number] [place] [address]-
0601  01 Oct, 2014   

 

 [facility] [place] [facility]  [address] [id number] [place]-
5729  01 Oct, 2014   

 

 [facility] [place] [facility]  [address] [id number] [place] [address]-
5745  15 Sep, 2014   

 

 [facility] [place] [facility]  [address] [id number] [place] [address]-
8686  15 Sep, 2014   

 

 [facility] [place] [facility]  [address] [id number] [place] [address]-
0911  18 Aug, 2014   

 

 [facility] [place] [facility]  [address] [id number] [place] [address]-
3402  18 Aug, 2014   

 

 [facility] [place] [facility]  [address] [id number] [place] [address]-
0918  18 Aug, 2014   

 

 [facility] [place] [facility]  [address] [id number] [place] [address]-
1036  18 Aug, 2014   

 

 [facility] [place] [facility]  [address] [id number] [place] [address]-
4668     

 

 [facility] [place] [facility]  [address] [id number] [place] [address]-
0880     

 

 [facility] [place] [facility]  [address] [id number] [place] [address]-
5318     

 

 [facility] [place] [facility]  [address] [id number] [place] [address]-
9614     

 

 [facility] [place] [facility]  [address] [id number] [place] [address]-
3407     

 

 [facility] [place] [facility]  [address] [id number] [place] [address]-
8275     

 

 [facility] [place] [facility]  [address] [id number] [place] [address]-
9995  30 May, 2014   

 

 [facility] [place] [facility]  [address] [id number] [place] [address]-
7692  28 May, 2014   

 

 [facility] [place] [facility]  [address] [id number] [place] [address]-
1607  28 May, 2014   

 

 [facility] [place] [facility]  [address] [id number] [place] [address]-
9243     

 

 [facility] [place] [facility]  [address] [id number] [place] [address]-
9629     

 

 [facility] [place] [facility]  [address] [id number] [place] [address]-
4270     

 

 [facility] [place] [facility]  [address] [id number] [place] [address]-
0534     

 

 [facility] [place] [facility]  [address] [id number] [place] [address]-
23[address]     

 

 [facility] [place] [facility]  [address] [id number] [place] [address]-
[address]     

 

 [facility] [place] [facility]  [address] [id number] [place] [address]-
4[address]     

 

 [facility] [place] [facility]  [address] [id number] [place] [address]-
6028     

 

 [facility] [place] [facility]  [address] [id number] [place] [address]-
[address]     

 

 [facility] [place] [facility]  [address] [id number] [place] [address]-
3882     

 

 [facility] [place] [facility]  [address] [id number] [place] [address]-
7880     

 

 [facility] [place] [facility]  [address] [id number] [place] [address]-
2774  28 Mar, 2014   

 

 [facility] [place] [facility]  [address] [id number] [place] [address]-
6026  27 Mar, 2014   

 

 [facility] [place] [facility]  [address] [id number] [place] [address]-
[address]86  27 Mar, 2014   

 

 [facility] [place] [facility]  [address] [id number] [place] [address]  26 Mar, 2014   

 

 [facility] [place] [facility]  [address] [id number] [place] [address]-
1812  26 Mar, 2014   

 

 [facility] [place] [facility]  [address] [id number] [place] [address]-
3617     

 

 [facility] [place] [facility]  [address] [id number] [place] [address]-
6341     

 

 [facility] [place] [facility]  [address] [id number] [place] [address]-
8252     

 

 [facility] [place] [facility]  [address] [id number] [place] [address]-
0746     

 

 [facility] [place] [facility]  [address] [id number] [place] [address]-
2173     

 

 [facility] [place] [facility]  [address] [id number] [place] [address]-
8167     

 

 [facility] [place] [facility]  [address] [id number] [place] [address]-
9433     

 

 [facility] [place] [facility]  [address] [id number] [place] [address]-
5589     

 

 [facility] [place] [facility]  [address] [id number] [place] [address]-
2790     

 

 [facility] [place] [facility]  [address] [id number] [place] [address]-
2629     

 

 [facility] [place] [facility]  [address] [id number] [place] [address]-
7758     

 

 [facility] [place] [facility]  [address] [id number] [place] [address]-
0193     

 

 [facility] [place] [facility]  [address] [id number] [place] [address]-
9859     

 

 [facility] [place] [facility]  [address] [id number] [place] [address]-
6926  10 Dec, 2013   

 

 [facility]  [address] [facility] [id number] [place] [address]-
0956  10 Dec, 2013   

 

 [facility]  [address] [facility] [id number] [address]-
3806     

 

 [facility]  [address] [facility] [id number] [address]-
3686     







IMMUNIZATIONS

No Known Immunizations



SOCIAL HISTORY

Never Assessed



REASON FOR VISIT

Med List Update



PLAN OF CARE





VITAL SIGNS





MEDICATIONS

Unknown Medications



RESULTS

No Results



PROCEDURES

No Known procedures



INSTRUCTIONS





MEDICATIONS ADMINISTERED

No Known Medications



MEDICAL (GENERAL) HISTORY







 Type  Description  Date

 

 Medical History  narcolepsy   

 

 Surgical History  Gallbladder removed  2015

 

 Surgical History  Hernia repair  2016

## 2018-09-02 NOTE — XMS REPORT
[facility]

 Created on: 2016



Sofiya Singh

External Reference #: [id number]

: 1955

Sex: Female



Demographics







 Address  [address] [place]  [address]

 

 Home Phone  [phone]

 

 Preferred Language  Unknown

 

 Marital Status  Unknown

 

 Rastafarian Affiliation  Unknown

 

 Race  White

 

 Ethnic Group  Not  or 





Author







 NAHUN Valente

 

 Organization  [employer]

 

 Address  Unknown

 

 Phone  Unavailable







Care Team Providers







 Care Team Member Name  Role  Phone

 

 NAHUN SNYDER  CP  Unavailable



                                                                



Allergies

          No Known Allergies                                                   
                                     



Problems

          





 Problem Type  Condition  Code  Onset Dates  Condition Status

 

 Problem  Pain in joint, pelvic region and thigh  719.45     Active

 

 Problem  Anxiety state, unspecified  300.00     Active

 

 Problem  Lumbago  724.2     Active

 

 Problem  Screening examination for pulmonary tuberculosis  V74.1     Active

 

 Problem  Pneumonia, organism unspecified  486     Active

 

 Problem  ADHD (attention deficit hyperactivity disorder), inattentive type  
F90.0     Active

 

 Problem  Unspecified arthropathy, site unspecified  716.90     Active

 

 Problem  Excessive daytime sleepiness  G47.19     Active

 

 Problem  Family history of malignant neoplasm of breast  V16.3     Active

 

 Problem  Family history of diabetes mellitus  V18.0     Active

 

 Problem  Family history of ischemic heart disease  V17.3     Active

 

 Problem  Family history of other cardiovascular diseases  V17.49     Active



                                                                               
                                                                               
                                        



Medications

          





 Medication  Code System  Code  Instructions  Start Date  End Date  Status  
Dosage

 

 tramadol  NDC  0  50 mg by oral route 4 times a day  2015        1 
tablet as needed



                                                                              



Results

          No Known Results                                                     
               



Summary Purpose

          [employer] Submission

## 2018-09-02 NOTE — XMS REPORT
[facility]

 Created on: 2018



Sofiya Singh

External Reference #: [id number]

: 1955

Sex: Female



Demographics







 Address  [address] [place]  [address]

 

 Preferred Language  Unknown

 

 Marital Status  Unknown

 

 Christianity Affiliation  Unknown

 

 Race  Unknown

 

 Ethnic Group  Unknown





Author







 Author  NAHUN SNYDER

 

 Organization  [facility]

 

 Address  [address] [place]  [address]



 

 Phone  [phone]







Care Team Providers







 Care Team Member Name  Role  Phone

 

 NAHUN SNYDER  Unavailable  [phone]







PROBLEMS







 Type  Condition  ICD9-CM Code  YXU87-MT Code  Onset Dates  Condition Status  
SNOMED Code

 

 Problem  ADHD (attention deficit hyperactivity disorder), inattentive type     
F90.0     Active  35071809

 

 Problem  Arthritis     M19.90     Active  8501067

 

 Problem  Positive skin test for tuberculosis     R76.11     Active  042862689

 

 Problem  Lumbago with sciatica, left side     M54.42     Active  306268640

 

 Problem  Excessive daytime sleepiness     G47.19     Active  201386662059

 

 Problem  Primary narcolepsy without cataplexy     G47.419     Active  
[id number]

 

 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 
    Active  [id number]







ALLERGIES

No Information



ENCOUNTERS







 Encounter  Location  Date  Diagnosis

 

 [place] [id number] [place] [address] 71754-
4746     

 

 [place] [id number] [place] [address] 37577-
2727    Right lower quadrant abdominal pain R10.31 and Rash R21

 

 [place] [id number] [place] [address] 37237-
3243  14 Mar, 2018  ADHD (attention deficit hyperactivity disorder), 
inattentive type F90.0 ; Lumbago with sciatica, left side M54.42 and Arthritis 
M19.90

 

 [place] [address] [address]-
6110    ADHD (attention deficit hyperactivity disorder), 
inattentive type F90.0 and Lumbar neuritis M54.16

 

 [place] [id number] [place] [address] 33162-
2890     

 

 [facility]  [address] [place] [address] 22655-
2746    Lumbar neuritis M54.16

 

 [facility]  [id number] [place] [address] 38272-
5248  16 2018  Low back pain M54.5

 

 [facility]  [id number] [place] [address] 95148-
1266    ADHD (attention deficit hyperactivity disorder), 
inattentive type F90.0

 

 [facility]  [id number] [place] [address] 13901-
6567    Positive skin test for tuberculosis R76.11

 

 [facility]  [id number] [place] [address] 62215-
1654    Positive skin test for tuberculosis R76.11

 

 [facility]  [id number] [place] [address] 54208-
3884     

 

 [facility]  [id number] [place] [address] 20613-
9233    Lumbar neuritis M54.16

 

 [facility]  [id number] [place] [address] 99790-
9631    ADHD (attention deficit hyperactivity disorder), 
inattentive type F90.0

 

 [facility]  [id number] [place] [address] 33693-
9293     

 

 [facility]  [id number] [place] [address] 88121-
4324  20 Dec, 2017  Lumbar neuritis M54.16

 

 [facility]  [id number] [place] [address] 20878-
5219  15 Dec, 2017  ADHD (attention deficit hyperactivity disorder), 
inattentive type F90.0

 

 [facility]  [id number] [place] [address] 61906-
8545  12 Dec, 2017   

 

 [facility]  [id number] [place] [address] 72232-
6050  11 Dec, 2017   

 

 [facility]  [address] [place] [address] 20652-
9357    Lumbar neuritis M54.16

 

 [facility]  [id number] [place] [address] 74097-
8080    ADHD (attention deficit hyperactivity disorder), 
inattentive type F90.0 and Primary narcolepsy without cataplexy G47.419

 

 [facility]  [id number] [place] [address] 03836-
2765  10 Nov, 2017   

 

 [facility]  [id number] [place] [address] 61528-
9152     

 

 [facility]  [id number] [place] [address] 96106-
8769  23 Oct, 2017  Lumbar neuritis M54.16 and ADHD (attention deficit 
hyperactivity disorder), inattentive type F90.0

 

 [facility]  [id number] [place] [address] 14139-
2190  12 Oct, 2017   

 

 [facility]  [id number] [place] [address] 81597-
9879  26 Sep, 2017  Lumbar neuritis M54.16 and ADHD (attention deficit 
hyperactivity disorder), inattentive type F90.0

 

 [facility]  [id number] [place] [address] 77908-
4248  19 Sep, 2017   

 

 [facility]  [id number] [place] [address] 02456-
3791  31 Aug, 2017  ADHD (attention deficit hyperactivity disorder), 
inattentive type F90.0 and Lumbar neuritis M54.16

 

 [facility]  [id number] [place] [address] 12615-
6344  24 Aug, 2017  Lumbar neuritis M54.16

 

 [facility]  [id number] [place] [address] 66188-
2889  23 Aug, 2017   

 

 [facility]  [id number] [place] [address] 08209-
3339  02 Aug, 2017  ADHD (attention deficit hyperactivity disorder), 
inattentive type F90.0 and Lumbar neuritis M54.16

 

 [facility]  [address] [address]-
1770  02 Aug, 2017   

 

 [facility]  [id number] [address]-
9339     

 

 [facility]  [id number] [place] [address] 84958-
9638     

 

 [facility]  [id number] [place] [address] 41982-
5322     

 

 [facility]  [id number] [place] [address] 15456-
1576     

 

 [facility]  [id number] [place] [address] 19316-
7269    ADHD (attention deficit hyperactivity disorder), 
inattentive type F90.0 and Lumbar neuritis M54.16

 

 [facility]  [address] [address]-
5870     

 

 [facility]  [id number] [place] [address] 86954-
3560     

 

 [facility]  [address] [place] [address] 52320-
5129    Lumbar neuritis M54.16 and ADHD (attention deficit 
hyperactivity disorder), inattentive type F90.0

 

 [facility]  [address] [address]-
0139     

 

 [facility]  [id number] [place] [address] 21807-
5529  10 May, 2017  Lumbar neuritis M54.16 and ADHD (attention deficit 
hyperactivity disorder), inattentive type F90.0

 

 [facility]  [address] [address]-
2452  03 May, 2017   

 

 [facility]  [id number] [place] [address] 85321-
4224  01 May, 2017   

 

 [facility]  [id number] [place] [address] 58137-
6491    Narcolepsy due to underlying condition without cataplexy 
G47.429 and Lumbago with sciatica, left side M54.42

 

 [facility]  [id number] [place] [address] 80567-
3999    Lumbar neuritis M54.16 and ADHD (attention deficit 
hyperactivity disorder), inattentive type F90.0

 

 [facility]  [id number] [place] [address] 89420-
2486  31 Mar, 2017   

 

 [facility]  [id number] [place] [address] 02771-
9238  15 Mar, 2017  Lumbar neuritis M54.16 and ADHD (attention deficit 
hyperactivity disorder), inattentive type F90.0

 

 [facility]  [id number] [place] [address] 23964-
1480  15 Mar, 2017   

 

 [facility]  [id number] [place] [address] 60670-
6928  06 Mar, 2017   

 

 [facility]  [id number] [place] [address] 68778-
6332  15 Feb, 2017  ADHD (attention deficit hyperactivity disorder), 
inattentive type F90.0

 

 [facility]  [id number] [place] [address] 57065-
2967  15 Feb, 2017  Lumbar neuritis M54.16

 

 [facility]  [id number] [place] [address] 06591-
5768     

 

 [facility]  [id number] [place] [address] 81420-
5881     

 

 [facility]  [id number] [place] [address] 27858-
7403    Attention deficit hyperactivity disorder (ADHD), 
predominantly inattentive type F90.0

 

 [facility]  [id number] [address]-
6927     

 

 [facility]  [id number] [place] [address] 70480-
3647  10 Niall, 2017   

 

 [facility]  [id number] [place] [address] 68649-
6520     

 

 [facility]  [id number] [place] [address] 57381-
7574  22 Dec, 2016  Attention deficit hyperactivity disorder (ADHD), 
predominantly inattentive type F90.0

 

 [facility]  [id number] [place] [address] 58055-
7790  12 Dec, 2016   

 

 [facility]  [id number] [place] [address] 06405-
5818  07 Dec, 2016   

 

 [facility]  [id number] [place] [address] 78664-
9221  05 Dec, 2016   

 

 [facility]  [id number] [place] [address] 06140-
8410  05 Dec, 2016  Low TSH level R94.6

 

 [facility]  [id number] [place] [address] 73307-
2911  05 Dec, 2016   

 

 [facility]  [id number] [place] [address] 34107-
0430  02 Dec, 2016   

 

 [facility]  [address] [place] [address] 78147-
0325  10 Nov, 2016   

 

 [facility]  [id number] [place] [address] 74853-
6386  10 Nov, 2016   

 

 [facility]  [address] [place] [address] 25468-
6019     

 

 [facility]  [id number] [place] [address] 62475-
3992  18 Oct, 2016  Low TSH level R94.6

 

 [facility]  [address] [place] [address] 28416-
1038  17 Oct, 2016  Excessive daytime sleepiness G47.19 and ADHD (attention 
deficit hyperactivity disorder), inattentive type F90.0

 

 [facility]  [id number] [place] [address] 53993-
6872  13 Oct, 2016   

 

 [facility]  [id number] [place] [address] 76900-
6942  12 Oct, 2016   

 

 [facility]  [id number] [place] [address] 86593-
6982  03 Oct, 2016   

 

 [facility]  [id number] [place] [address] 95069-
8037  28 Sep, 2016   

 

 [facility]  [id number] [place] [address] 47560-
6961  14 Sep, 2016   

 

 [facility]  [id number] [place] [address] 77999-
8962  01 Sep, 2016   

 

 [facility]  [id number] [place] [address] 53916-
6488  17 Aug, 2016   

 

 [facility]  [id number] [place] [address] 17474-
1671  04 Aug, 2016   

 

 [facility]  [id number] [place] [address] 61328-
4503  03 Aug, 2016   

 

 [facility]  [address] [place] [address] 31989-
0309    Lumbar neuritis M54.16

 

 [facility]  [id number] [place] [address] 75588-
6603     

 

 [facility]  [id number] [place] [address] 33654-
4414     

 

 [facility]  [id number] [place] [address] 03187-
5837    Lumbar neuritis M54.16

 

 [facility]  [id number] [place] [address] 78263-
4793     

 

 [facility]  [id number] [place] [address] 01973-
9210     

 

 [facility]  [address] [address]-
2267  26 May, 2016  Lumbar neuritis M54.16

 

 [facility]  [address] [address]-
3642  25 May, 2016   

 

 [facility]  [id number] [place] [address] 01291-
8475  10 May, 2016   

 

 [facility]  [address] [place] [address] 09530-
2148    Lumbar neuritis M54.16

 

 [facility]  [id number] [place] [address] 30194-
5964     

 

 [facility]  [address] [place] [address] 36633-
5188     

 

 [facility]  [address] [place] [address] 51976-
8771  23 Mar, 2016   

 

 [facility]  [address] [address]-
4448  14 Mar, 2016   

 

 [facility]  [address] [address]-
8950  14 Mar, 2016   

 

 [facility]  [address] [address]-
3812  11 Mar, 2016   

 

 [facility]  [address] [address]-
1375  24 2016   

 

 [facility]  [address] [address]-
7860    Inguinal hernia, right K40.90

 

 [facility]  [address] [address]-
8020  17 2016   

 

 [facility]  [address] [address]-
0791  15 2016  Low back pain M54.5 and Sciatica, unspecified side M54.30

 

 [facility]  [address] [facility] [id number] [address]-
0674     

 

 [facility]  [address] [facility] [id number] [address]-
0632     

 

 [facility]  [address] [facility] [id number] [address]-
5252  30 Dec, 2015   

 

 [facility]  [address] [facility] [id number] [place] [address] 66965-
6284  28 Dec, 2015   

 

 [facility]  [address] [facility] [id number] [place] [address]-
2971  02 Dec, 2015   

 

 [facility]  [id number] [place] [address] 20573-
3741     

 

 [facility]  [address] [address]-
2270     

 

 [facility]  [address] [place] [address] 74442-
0324     

 

 [facility]  [id number] [address]-
1597     

 

 [facility]  [address] [address]-
2796  26 Oct, 2015   

 

 [facility]  [address] [address]-
1383  22 Oct, 2015  Encounter for immunization Z23

 

 [facility]  [address] [facility] [id number] [address]-
3797  07 Oct, 2015   

 

 [facility]  [address] [facility] [id number] [address]-
0660  05 Oct, 2015   

 

 [facility]  [id number] [address]-
6065  09 Sep, 2015   

 

 [facility]  [id number] [address]-
6655  08 Sep, 2015   

 

 [facility]  [address] [address]-
4300  19 Aug, 2015  Lumbago 724.2

 

 [facility] [place] [facility]  [address] [id number] [place]
6475  12 Aug, 2015   

 

 [facility] [place] [facility]  [address] [id number] [place] [address]-
4052    Lumbago 724.2

 

 [facility] [place] [facility]  [address] [id number] [place] [address]-
0744     

 

 [facility] [place] [facility]  [address] [id number] [place] [address]-
7391     

 

 [facility] [place] [facility]  [address] [id number] [place] [address]-
8883     

 

 [facility] [place] [facility]  [address] [id number] [place] [address]-
5825     

 

 [facility] [place] [facility]  [address] [id number] [place] [address]-
3203     

 

 [facility] [place] [facility]  [address] [id number] [place] [address]-
9448  22 May, 2015   

 

 [facility] [place] [facility]  [address] [id number] [place] [address]-
2987     

 

 [facility] [place] [facility]  [address] [id number] [place] [address]-
5243     

 

 [facility] [place] [facility]  [address] [id number] [place] [address]-
9357  30 Mar, 2015   

 

 [facility] [place] [facility]  [address] [id number] [place] [address]-
5945  30 Mar, 2015   

 

 [facility] [place] [facility]  [address] [id number] [place] [address]-
8966  02 Mar, 2015   

 

 [facility] [place] [facility]  [address] [id number] [place] [address]-
2542  02 Mar, 2015   

 

 [facility] [place] [facility]  [address] [id number] [place] [address]-
9700  ,    

 

 [facility] [place] [facility]  [address] [id number] [place] [address]-
4580  ,    

 

 [facility] [place] [facility]  [address] [id number] [place] [address]-
2776     

 

 [facility] [place] [facility]  [address] [id number] [place] [address]-
9416     

 

 [facility] [place] [facility]  [address] [id number] [place] [address]-
0377     

 

 [facility] [place] [facility]  [address] [id number] [place] [address]-
7224     

 

 [facility] [place] [facility]  [address] [id number] [place] [address]-
5524  31 Dec, 2014   

 

 [facility] [place] [facility]  [address] [id number] [place] [address]-
5939  31 Dec, 2014   

 

 [facility] [place] [facility]  [address] [id number] [place] [address]-
5282  22 Dec, 2014   

 

 [facility] [place] [facility]  [address] [id number] [place] [address]-
5903  22 Dec, 2014   

 

 [facility] [place] [facility]  [address] [facility] [id number] [place] [address]-
4546  08 Dec, 2014   

 

 [facility] [place] [facility]  [address] [id number] [place] [address]-
5170  08 Dec, 2014   

 

 [facility] [place] [facility]  [address] [id number] [place] [address]-
7261  10 Nov, 2014   

 

 [facility] [place] [facility]  [address] [id number] [place]
9936  10 Nov, 2014   

 

 [facility] [place] [facility]  [address] [id number] [place]-
3214  13 Oct, 2014   

 

 [facility] [place] [facility]  [address] [id number] [place]
5660  13 Oct, 2014   

 

 [facility] [place] [facility]  [address] [id number] [place] [address]-
1784  01 Oct, 2014   

 

 [facility] [place] [facility]  [address] [id number] [place]
6113  01 Oct, 2014   

 

 [facility] [place] [facility]  [address] [id number] [place] [address]-
6779  15 Sep, 2014   

 

 [facility] [place] [facility]  [address] [id number] [place] [address]-
3498  15 Sep, 2014   

 

 [facility] [place] [facility]  [address] [id number] [place] [address]-
2383  18 Aug, 2014   

 

 [facility] [place] [facility]  [address] [id number] [place] [address]-
6037  18 Aug, 2014   

 

 [facility] [place] [facility]  [address] [id number] [place] [address]-
8933  18 Aug, 2014   

 

 [facility] [place] [facility]  [address] [id number] [place] [address]-
1161  18 Aug, 2014   

 

 [facility] [place] [facility]  [address] [id number] [place] [address]-
9435     

 

 [facility] [place] [facility]  [address] [id number] [place] [address]-
0875     

 

 [facility] [place] [facility]  [address] [id number] [place] [address]-
8091     

 

 [facility] [place] [facility]  [address] [id number] [place] [address]-
3108     

 

 [facility] [place] [facility]  [address] [id number] [place] [address]-
8685     

 

 [facility] [place] [facility]  [address] [id number] [place] [address]-
7941     

 

 [facility] [place] [facility]  [address] [id number] [place] [address]-
3390  30 May, 2014   

 

 [facility] [place] [facility]  [address] [id number] [place] [address]-
7468  28 May, 2014   

 

 [facility] [place] [facility]  [address] [id number] [place] [address]-
4468  28 May, 2014   

 

 [facility] [place] [facility]  [address] [id number] [place] [address]-
4510     

 

 [facility] [place] [facility]  [address] [id number] [place] [address]-
2956     

 

 [facility] [place] [facility]  [address] [id number] [place] [address]-
5157     

 

 [facility] [place] [facility]  [address] [id number] [place] [address]-
5634     

 

 [facility] [place] [facility]  [address] [id number] [place] [address]-
8443     

 

 [facility] [place] [facility]  [address] [id number] [place] [address]-
3850     

 

 [facility] [place] [facility]  [address] [id number] [place] [address]-
3597     

 

 [facility] [place] [facility]  [address] [id number] [place] [address]-
9264     

 

 [facility] [place] [facility]  [address] [id number] [place] [address]-
7085     

 

 [facility] [place] [facility]  [address] [id number] [place] [address]-
2235     

 

 [facility] [place] [facility]  [address] [id number] [place] [address]-
6358     

 

 [facility] [place] [facility]  [address] [id number] [place] [address]-
0983  28 Mar, 2014   

 

 [facility] [place] [facility]  [address] [id number] [place] [address]-
3551  27 Mar, 2014   

 

 [facility] [place] [facility]  [address] [id number] [place] [address]-
9775  27 Mar, 2014   

 

 [facility] [place] [facility]  [address] [id number] [place] [address]-
3493  26 Mar, 2014   

 

 [facility] [place] [facility]  [address] [id number] [place] [address]-
2404  26 Mar, 2014   

 

 [facility] [place] [facility]  [address] [id number] [place] [address]-
9452     

 

 [facility] [place] [facility]  [address] [id number] [place] [address]-
8073     

 

 [facility] [place] [facility]  [address] [id number] [place] [address]-
1693     

 

 [facility] [place] [facility]  [address] [id number] [place] [address]-
3973     

 

 [facility] [facility]  [address] [id number] [place] [address]-
8901     

 

 [facility] [place] [facility]  [address] [id number] [place] [address]-
7413     

 

 [facility] [place] [facility]  [address] [id number] [place] [address]-
7555     

 

 [facility] [place] [facility]  [address] [id number] [place] [address]-
2074     

 

 [facility] [place] [facility]  [address] [id number] [place] [address]-
1080     

 

 [facility] [place] [facility]  [address] [id number] [place] [address]-
0061     

 

 [facility] [place] [facility]  [address] [id number] [place] [address]-
9761     

 

 [facility] [place] [facility]  [address] [id number] [place] [address]     

 

 [facility] [place] [facility]  [address] [id number] [place] [address]-
[address]     

 

 [facility] [place] [facility]  [address] [id number] [place] [address]-
[address]  10 Dec, 2013   

 

 [facility]  [address] [facility] [id number] [place] [address]  10 Dec, 2013   

 

 [facility]  [address] [facility] [id number] [address]-
[address]     

 

 [facility]  [address] [facility] [id number] [address]-
[address]     







IMMUNIZATIONS

No Known Immunizations



SOCIAL HISTORY

Never Assessed



REASON FOR VISIT

Controlled Med Refill - 18



PLAN OF CARE





VITAL SIGNS





MEDICATIONS







 Medication  Instructions  Dosage  Frequency  Start Date  End Date  Duration  
Status

 

 Xanax 1 MG  Orally 2 times a day  1 tablet  12h  24 Aug, 2017     28 days  
Active

 

 Norco  MG  Orally every 6 hrs  1 tablet as needed  6h       
28 days  Active







RESULTS

No Results



PROCEDURES

No Known procedures



INSTRUCTIONS





MEDICATIONS ADMINISTERED

No Known Medications



MEDICAL (GENERAL) HISTORY







 Type  Description  Date

 

 Medical History  narcolepsy   

 

 Surgical History  Gallbladder removed  2015

 

 Surgical History  Hernia repair  2016

## 2018-09-02 NOTE — XMS REPORT
[facility]

 Created on: 2016



Samantha Danykath

External Reference #: [id number]

: 1955

Sex: Female



Demographics







 Address  [address] [place]  [address]

 

 Home Phone  [phone]

 

 Preferred Language  Unknown

 

 Marital Status  Unknown

 

 Latter day Affiliation  Unknown

 

 Race  White

 

 Ethnic Group  Not  or 





Author







 NAHUN Valente

 

 [facility]  [employer]

 

 Address  Unknown

 

 Phone  Unavailable







Care Team Providers







 Care Team Member Name  Role  Phone

 

 NAHUN SNYDER  CP  Unavailable



                                                                



Allergies

          No Known Allergies                                                   
                                     



Problems

          





 Problem Type  Condition  Code  Onset Dates  Condition Status

 

 Problem  Pain in joint, pelvic region and thigh  719.45     Active

 

 Problem  Anxiety state, unspecified  300.00     Active

 

 Problem  Lumbago  724.2     Active

 

 Problem  Screening examination for pulmonary tuberculosis  V74.1     Active

 

 Problem  Pneumonia, organism unspecified  486     Active

 

 Problem  ADHD (attention deficit hyperactivity disorder), inattentive type  
F90.0     Active

 

 Problem  Unspecified arthropathy, site unspecified  716.90     Active

 

 Problem  Excessive daytime sleepiness  G47.19     Active

 

 Problem  Family history of malignant neoplasm of breast  V16.3     Active

 

 Problem  Family history of diabetes mellitus  V18.0     Active

 

 Problem  Family history of ischemic heart disease  V17.3     Active

 

 Problem  Family history of other cardiovascular diseases  V17.49     Active



                                                                               
                                                                               
                                        



Medications

          





 Medication  Code System  Code  Instructions  Start Date  End Date  Status  
Dosage

 

 Adderall  NDC  [address]-03  10 mg Orally Once a day  Oct 17, 2016        1 
tablet in the morning

 

 Hydrocodone-Acetaminophen  NDC  [address]-25   MG Orally every 6 hrs  
2015        1 tablet as needed



                                                                               
         



Results

          No Known Results                                                     
               



Summary Purpose

          [employer] Submission

## 2018-09-02 NOTE — XMS REPORT
[facility]

 Created on: 2017



Samantha Danykath

External Reference #: [id number]

: 1955

Sex: Female



Demographics







 Address  [address] [place]  [address]

 

 Preferred Language  Unknown

 

 Marital Status  Unknown

 

 Buddhism Affiliation  Unknown

 

 Race  Unknown

 

 Ethnic Group  Unknown





Author







 Author  NAHUN SNYDER

 

 [facility]

 

 Address  [address] [place]  [address]



 

 Phone  [phone]







Care Team Providers







 Care Team Member Name  Role  Phone

 

 NAHUN SNYDER  Unavailable  [phone]







PROBLEMS







 Type  Condition  ICD9-CM Code  XNV94-WJ Code  Onset Dates  Condition Status  
SNOMED Code

 

 Problem  Lumbago  724.2        Active  239562938

 

 Problem  Family history of diabetes mellitus  V18.0        Active  608934544

 

 Problem  Anxiety state, unspecified  300.00        Active  358131521

 

 Assessment  Low TSH level     R94.6  05 Dec, 2016  Active  199353505

 

 Problem  Screening examination for pulmonary tuberculosis  V74.1        Active
  236075357

 

 Problem  Pneumonia, organism unspecified  486        Active  956134882

 

 Problem  Pain in joint, pelvic region and thigh  719.45        Active  
145761080

 

 Problem  Excessive daytime sleepiness     G47.19     Active  [id number]

 

 Problem  ADHD (attention deficit hyperactivity disorder), inattentive type     
F90.0     Active  41187670

 

 Problem  Family history of other cardiovascular diseases  V17.49        Active
  901333160

 

 Problem  Family history of malignant neoplasm of breast  V16.3        Active  
342378080

 

 Problem  Unspecified arthropathy, site unspecified  716.90        Active  
569192250

 

 Problem  Family history of ischemic heart disease  V17.3        Active  
558269319







ALLERGIES

Unknown Allergies



SOCIAL HISTORY

No smoking Hx information available



PLAN OF CARE





VITAL SIGNS





MEDICATIONS

Unknown Medications



RESULTS

No Results



PROCEDURES







 Procedure  Date Ordered  Related Diagnosis  Body Site

 

 ASSAY THYROID STIM HORMONE  Dec 05, 2016      

 

 VENIPUNCT, ROUTINE*  Dec 05, 2016      







IMMUNIZATIONS

No Known Immunizations

## 2018-09-02 NOTE — XMS REPORT
[facility]

 Created on: 2018



Sofiya Singh

External Reference #: [id number]

: 1955

Sex: Female



Demographics







 Address  [address] [place]  [address]

 

 Preferred Language  Unknown

 

 Marital Status  Unknown

 

 Baptism Affiliation  Unknown

 

 Race  Unknown

 

 Ethnic Group  Unknown





Author







 Author  NAHUN SNYDER

 

 Organization  [facility]

 

 Address  [address] [place]  [address]



 

 Phone  [phone]







Care Team Providers







 Care Team Member Name  Role  Phone

 

 NAHUN SNYDER  Unavailable  [phone]







PROBLEMS







 Type  Condition  ICD9-CM Code  HRP96-WH Code  Onset Dates  Condition Status  
SNOMED Code

 

 Problem  ADHD (attention deficit hyperactivity disorder), inattentive type     
F90.0     Active  42852038

 

 Problem  Arthritis     M19.90     Active  7307830

 

 Problem  Positive skin test for tuberculosis     R76.11     Active  147810266

 

 Problem  Lumbago with sciatica, left side     M54.42     Active  889256885

 

 Problem  Excessive daytime sleepiness     G47.19     Active  760892378731

 

 Problem  Primary narcolepsy without cataplexy     G47.419     Active  
[id number]

 

 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 
    Active  06481709204464







ALLERGIES

No Information



ENCOUNTERS







 Encounter  Location  Date  Diagnosis

 

 [place] [id number] [place] [address] 14044-
7707  14 May, 2018   

 

 [place] [id number] [place] [address] 22035-
7332  14 Mar, 2018  ADHD (attention deficit hyperactivity disorder), 
inattentive type F90.0 ; Lumbago with sciatica, left side M54.42 and Arthritis 
M19.90

 

 [facility]  [id number] [place] [address] 64668-
0692    ADHD (attention deficit hyperactivity disorder), 
inattentive type F90.0 and Lumbar neuritis M54.16

 

 [place] [id number] [place] [address] 26708-
5343  16 2018   

 

 [place] [id number] [place] [address] 13722-
9230    Lumbar neuritis M54.16

 

 [place] [address] [place] [address] 27986-
9387  16 2018  Low back pain M54.5

 

 [facility]  [id number] [place] [address] 96442-
1785    ADHD (attention deficit hyperactivity disorder), 
inattentive type F90.0

 

 [facility]  [id number] [place] [address] 84297-
7399    Positive skin test for tuberculosis R76.11

 

 [facility]  [id number] [place] [address] 32708-
7946    Positive skin test for tuberculosis R76.11

 

 [facility]  [id number] [place] [address] 48451-
0560     

 

 [facility]  [id number] [place] [address] 30706-
5329    Lumbar neuritis M54.16

 

 [facility]  [id number] [place] [address] 47512-
8387    ADHD (attention deficit hyperactivity disorder), 
inattentive type F90.0

 

 [facility]  [id number] [place] [address] 37380-
8060     

 

 [facility]  [id number] [place] [address] 58097-
6103  20 Dec, 2017  Lumbar neuritis M54.16

 

 [facility]  [address] [place] [address] 15029-
1983  15 Dec, 2017  ADHD (attention deficit hyperactivity disorder), 
inattentive type F90.0

 

 [facility]  [id number] [place] [address] 11794-
2651  12 Dec, 2017   

 

 [facility]  [id number] [place] [address] 19733-
2545  11 Dec, 2017   

 

 [facility]  [id number] [place] [address] 65663-
8468    Lumbar neuritis M54.16

 

 [facility]  [id number] [place] [address] 69161-
5727    ADHD (attention deficit hyperactivity disorder), 
inattentive type F90.0 and Primary narcolepsy without cataplexy G47.419

 

 [facility]  [id number] [place] [address] 39630-
3919  10 Nov, 2017   

 

 [facility]  [id number] [place] [address] 61416-
9950     

 

 [facility]  [id number] [place] [address] 98938-
1039  23 Oct, 2017  Lumbar neuritis M54.16 and ADHD (attention deficit 
hyperactivity disorder), inattentive type F90.0

 

 [facility]  [id number] [place] [address] 80515-
5807  12 Oct, 2017   

 

 [facility]  [id number] [place] [address] 88773-
8884  26 Sep, 2017  Lumbar neuritis M54.16 and ADHD (attention deficit 
hyperactivity disorder), inattentive type F90.0

 

 [facility]  [id number] [place] [address] 38736-
3143  19 Sep, 2017   

 

 [facility]  [id number] [place] [address] 71936-
6982  31 Aug, 2017  ADHD (attention deficit hyperactivity disorder), 
inattentive type F90.0 and Lumbar neuritis M54.16

 

 [facility]  [id number] [place] [address] 09853-
7347  24 Aug, 2017  Lumbar neuritis M54.16

 

 [facility]  [id number] [place] [address] 71179-
4571  23 Aug, 2017   

 

 [facility]  [id number] [place] [address] 63272-
8757  02 Aug, 2017  ADHD (attention deficit hyperactivity disorder), 
inattentive type F90.0 and Lumbar neuritis M54.16

 

 [facility]  [address] [address]-
4327  02 Aug, 2017   

 

 [facility]  [address] [address]-
6621     

 

 [facility]  [address] 872869-
2682     

 

 [facility]  [address] [address]-
7664     

 

 [facility]  [address] [address]-
8423     

 

 [facility]  [id number] [place] [address] 89720-
0214    ADHD (attention deficit hyperactivity disorder), 
inattentive type F90.0 and Lumbar neuritis M54.16

 

 [facility]  [id number] [address]-
5057     

 

 [facility]  [id number] [address]-
3863     

 

 [facility]  [address] [place] [address] 66041-
3526    Lumbar neuritis M54.16 and ADHD (attention deficit 
hyperactivity disorder), inattentive type F90.0

 

 [facility]  [address], [place] [address]-
4938     

 

 [facility]  [address] [address]-
4309  10 May, 2017  Lumbar neuritis M54.16 and ADHD (attention deficit 
hyperactivity disorder), inattentive type F90.0

 

 [facility]  [address] 083489-
5793  03 May, 2017   

 

 [facility]  [address] 0023[place]-
1005  01 May, 2017   

 

 [facility]  [address] [place] [address]-
6245    Narcolepsy due to underlying condition without cataplexy 
G47.429 and Lumbago with sciatica, left side M54.42

 

 [facility]  [id number] [place] [address] 58268-
9666    Lumbar neuritis M54.16 and ADHD (attention deficit 
hyperactivity disorder), inattentive type F90.0

 

 [facility]  [id number] [place] [address] 73705-
5259  31 Mar, 2017   

 

 [facility]  [id number] [place] [address] 57481-
5086  15 Mar, 2017  Lumbar neuritis M54.16 and ADHD (attention deficit 
hyperactivity disorder), inattentive type F90.0

 

 [facility]  [id number] [place] [address] 49522-
7163  15 Mar, 2017   

 

 [facility]  [id number] [place] [address] 54288-
7448  06 Mar, 2017   

 

 [facility]  [id number] [place] [address] 61212-
0343  15 Feb, 2017  ADHD (attention deficit hyperactivity disorder), 
inattentive type F90.0

 

 [facility]  [id number] [place] [address] 28459-
9256  15 Feb, 2017  Lumbar neuritis M54.16

 

 [facility]  [id number] [place] [address] 60193-
9569  08 2017   

 

 [facility]  [id number] [place] [address] 47228-
5097  07 2017   

 

 [facility]  [id number] [place] [address] 20080-
4181    Attention deficit hyperactivity disorder (ADHD), 
predominantly inattentive type F90.0

 

 [facility]  [id number] [place] [address] 63246-
9158     

 

 [facility]  [id number] [place] [address] 16380-
2781  10 Niall, 2017   

 

 [facility]  [address] [place] [address] 59243-
1652     

 

 [facility]  [id number] [place] [address] 37613-
4018  22 Dec, 2016  Attention deficit hyperactivity disorder (ADHD), 
predominantly inattentive type F90.0

 

 [facility]  [id number] [place] [address] 49338-
8584  12 Dec, 2016   

 

 [facility]  [id number] [place] [address] 33646-
3013  07 Dec, 2016   

 

 [facility]  [id number] [place] [address] 66343-
4174  05 Dec, 2016   

 

 [facility]  [id number] [place] [address] 83157-
9651  05 Dec, 2016   

 

 [facility]  [id number] [place] [address] 33899-
8552  05 Dec, 2016  Low TSH level R94.6

 

 [facility]  [id number] [place] [address] 52216-
8850  02 Dec, 2016   

 

 [facility]  [id number] [place] [address] 88896-
3962  10 Nov, 2016   

 

 [facility]  [id number] [place] [address] 44348-
3973  10 Nov, 2016   

 

 [facility]  [id number] [place] [address] 01149-
8156     

 

 [facility]  [address] [place] [address] 24419-
2136  18 Oct, 2016  Low TSH level R94.6

 

 [facility]  [id number] [place] [address] 43955-
3308  17 Oct, 2016  Excessive daytime sleepiness G47.19 and ADHD (attention 
deficit hyperactivity disorder), inattentive type F90.0

 

 [facility]  [id number] [place] [address] 32324-
5867  13 Oct, 2016   

 

 [facility]  [address] [facility] [id number] [place] [address]-
8383  12 Oct, 2016   

 

 [facility] [place] [facility]  [address] [facility] [id number] [place] [address]-
1799  03 Oct, 2016   

 

 [facility]  [address] [facility] [id number] [place] [address]-
1193  28 Sep, 2016   

 

 [facility] [place] [facility]  [address] [facility] [id number] [place] [address], [place] [address]-
4035  14 Sep, 2016   

 

 [facility] [place] [facility]  [address] [facility] [id number] [place] [address]-
9301  01 Sep, 2016   

 

 [facility] [place] [facility]  [id number] [place] [address], [place] [address]-
4496  17 Aug, 2016   

 

 [facility] [place] [facility]  [id number] [place] [address], [place] [address]-
6654  04 Aug, 2016   

 

 [facility] [place] [facility]  [address] [facility] [id number] [place] [address], [place] [address]-
5233  03 Aug, 2016   

 

 [facility]  [id number], [place] [address]-
4001    Lumbar neuritis M54.16

 

 [facility]  [id number], [place] [address]-
9193     

 

 [facility] [place] [facility]  [address], [place] [address]-
1165     

 

 [facility] [place] [facility]  [address] [facility] [id number] [address]-
5696    Lumbar neuritis M54.16

 

 [facility] [place] [facility]  [address] [facility] [id number] [place]-
0273     

 

 [facility] [place] [facility]  [address] [facility] [id number] [place] [address]-
8795     

 

 [facility] [place] [facility]  [address], [place] [address]-
9808  26 May, 2016  Lumbar neuritis M54.16

 

 [facility]  [address] [place] [address]-
0144  25 May, 2016   

 

 [facility]  [id number] [place] [address] 01804-
4000  10 May, 2016   

 

 [facility]  [id number] [place] [address] 66670-
9133    Lumbar neuritis M54.16

 

 [facility]  [id number] [place] [address] 21468-
1610     

 

 [facility]  [id number] [place] [address] 29570-
5692     

 

 [facility]  [id number] [place] [address] 25565-
9549  23 Mar, 2016   

 

 [facility]  [id number] [place] [address] 42097-
7547  14 Mar, 2016   

 

 [facility]  [id number] [place] [address] 43472-
7789  14 Mar, 2016   

 

 [facility]  [id number] [place] [address] 88987-
6138  11 Mar, 2016   

 

 [facility]  [id number] [address]-
7978  24 2016   

 

 [facility]  [id number] [place] [address] 13479-
8383  22 2016  Inguinal hernia, right K40.90

 

 [facility]  [address], [place] [address]-
6921  17 2016   

 

 [facility]  [id number] [place] [address] 67260-
7062  15 2016  Low back pain M54.5 and Sciatica, unspecified side M54.30

 

 [facility]  [address], [place] [address]-
7253     

 

 [facility]  [address] [id number] [place] [address]-
4332     

 

 [facility]  [address] [facility] [id number] [place] [address], [place] [address]-
9943  30 Dec, 2015   

 

 [facility] [place] [facility]  [address] [facility] [id number] [place] [address]-
6545  28 Dec, 2015   

 

 [facility]  [address] [facility] [id number] [place] [address], [place] [address]-
0685  02 Dec, 2015   

 

 [facility] [place] [facility]  [address] [facility] [id number] [place] [address]-
7186     

 

 [facility]  [id number] [place] [address], [place] [address]-
6017     

 

 [facility]  [id number] [place] [address], [place] [address]-
7398     

 

 [facility]  [id number] [place] [address], [place] [address]-
8243     

 

 [facility]  [id number] [place] [address] 34483-
6093  26 Oct, 2015   

 

 [facility]  [id number] [place] [address] 63366-
6340  22 Oct, 2015  Encounter for immunization Z23

 

 [facility]  [address], [place] [address]-
5651  07 Oct, 2015   

 

 [facility]  [id number], [place] [address]-
6284  05 Oct, 2015   

 

 [facility]  [id number], [place] [address]-
4857  09 Sep, 2015   

 

 [facility]  [id number] [place] [address]-
4545  08 Sep, 2015   

 

 [facility]  [id number], [place] [address]-
1702  19 Aug, 2015  Lumbago 724.2

 

 [facility]  [id number] [place] [address]-
9987  12 Aug, 2015   

 

 [facility] [place] [facility]  [address] [id number] [place] [address]-
9256    Lumbago 724.2

 

 [facility] [place] [facility]  [address] [id number] [place] [address]-
1096     

 

 [facility] [place] [facility]  [address] [id number] [place] [address]-
8213     

 

 [facility] [place] [facility]  [address] [id number] [place] [address]-
1740     

 

 [facility] [place] [facility]  [address] [id number] [place] [address]-
2818     

 

 [facility] [place] [facility]  [address] [id number] [place] [address]-
0215     

 

 [facility] [place] [facility]  [address] [id number] [place] [address]-
8364  22 May, 2015   

 

 [facility] [place] [facility]  [address] [id number] [place] [address]-
2320     

 

 [facility] [place] [facility]  [address] [id number] [place] [address]-
3562     

 

 [facility] [place] [facility]  [address] [facility] [id number] [place] [address]-
1195  30 Mar, 2015   

 

 [facility] [place] [facility]  [address] [id number] [place] [address]-
9090  30 Mar, 2015   

 

 [facility] [place] [facility]  [address] [id number] [place] [address]-
7036  02 Mar, 2015   

 

 [facility] [place] [facility]  [address] [id number] [place] [address]-
2356  02 Mar, 2015   

 

 [facility] [place] [facility]  [address] [id number] [place] [address]-
7196     

 

 [facility] [place] [facility]  [address] [id number] [place] [address]-
2204     

 

 [facility] [place] [facility]  [address] [id number] [place] [address]-
0738     

 

 [facility] [place] [facility]  [address] [id number] [place] [address]-
1776     

 

 [facility] [place] [facility]  [address] [facility] [id number] [place] [address]-
7163     

 

 [facility] [place] [facility]  [address] [id number] [place] [address]-
0052     

 

 [facility] [place] [facility]  [address] [id number] [place] [address]-
9777  31 Dec, 2014   

 

 [facility] [place] [facility]  [address] [id number] [place] [address]-
4559  31 Dec, 2014   

 

 [facility] [place] [facility]  [address] [id number] [place] [address]-
9121  22 Dec, 2014   

 

 [facility] [place] [facility]  [address] [id number] [place] [address]-
4734  22 Dec, 2014   

 

 [facility] [place] [facility]  [address] [id number] [place] [address]-
2498  08 Dec, 2014   

 

 [facility] [place] [facility]  [address] [facility] [id number] [place] [address]-
6094  08 Dec, 2014   

 

 [facility] [place] [facility]  [address] [id number] [place] [address]-
6010  10 Nov, 2014   

 

 [facility] [place] [facility]  [address] [id number] [address]-
9150  10 Nov, 2014   

 

 [facility] [place] [facility]  [address] [id number] [place] [address]-
8556  13 Oct, 2014   

 

 [facility] [place] [facility]  [address] [id number] [place] [address]-
5243  13 Oct, 2014   

 

 [facility] [place] [facility]  [address] [facility] [id number] [place] [address]-
6185  01 Oct, 2014   

 

 [facility] [place] [facility]  [address] [id number] [place] [address]-
3331  01 Oct, 2014   

 

 [facility] [place] [facility]  [address] [id number] [place] [address]-
2475  15 Sep, 2014   

 

 [facility] [place] [facility]  [address] [id number] [place]-
5494  15 Sep, 2014   

 

 [facility] [place] [facility]  [address] [id number] [place] [address]-
0991  18 Aug, 2014   

 

 [facility] [place] [facility]  [address] [id number] [place] [address]-
2714  18 Aug, 2014   

 

 [facility] [place] [facility]  [address] [id number] [place] [address]-
4143  18 Aug, 2014   

 

 [facility] [place] [facility]  [address] [id number] [place] [address]-
5229  18 Aug, 2014   

 

 [facility] [place] [facility]  [address] [id number] [place] [address]-
2464     

 

 [facility] [place] [facility]  [address] [id number] [place] [address]-
9719     

 

 [facility] [place] [facility]  [address] [id number] [place] [address]-
6582     

 

 [facility] [place] [facility]  [address] [id number] [place] [address]-
2351     

 

 [facility] [place] [facility]  [address] [id number] [place] [address]-
6994     

 

 [facility] [place] [facility]  [address] [id number] [place] [address]-
8912     

 

 [facility] [place] [facility]  [address] [id number] [place] [address]-
3183  30 May, 2014   

 

 [facility] [place] [facility]  [address] [id number] [place] [address]-
3890  28 May, 2014   

 

 [facility] [place] [facility]  [address] [id number] [place] [address]-
2710  28 May, 2014   

 

 [facility] [place] [facility]  [address] [id number] [place] [address]-
1849     

 

 [facility] [place] [facility]  [address] [id number] [place] [address]-
8330     

 

 [facility] [place] [facility]  [address] [id number] [place] [address]-
0815     

 

 [facility] [place] [facility]  [address] [id number] [place] [address]-
7167     

 

 [facility] [place] [facility]  [address] [id number] [place] [address]-
0599     

 

 [facility] [place] [facility]  [address] [id number] [place] [address]-
3502     

 

 [facility] [place] [facility]  [address] [id number] [place] [address]-
3615     

 

 [facility] [place] [facility]  [address] [id number] [place] [address]-
7210     

 

 [facility] [place] [facility]  [address] [id number] [place] [address]-
2872     

 

 [facility] [place] [facility]  [address] [id number] [place] [address]-
5358     

 

 [facility] [place] [facility]  [address] [id number] [place] [address]-
6983     

 

 [facility] [place] [facility]  [address] [id number] [place] [address]-
9912  28 Mar, 2014   

 

 [facility] [place] [facility]  [address] [id number] [place] [address]-
9791  27 Mar, 2014   

 

 [facility] [place] [facility]  [address] [id number] [place] [address]-
9430  27 Mar, 2014   

 

 [facility] [place] [facility]  [address] [id number] [place] [address]-
4872  26 Mar, 2014   

 

 [facility] [place] [facility]  [address] [id number] [place] [address]-
9251  26 Mar, 2014   

 

 [facility] [place] [facility]  [address] [id number] [place] [address]-
0856     

 

 [facility] [place] [facility]  [address] [id number] [place] [address]-
9776     

 

 [facility] [place] [facility]  [address] [id number] [place] [address]-
1366     

 

 [facility] [place] [facility]  [address] [id number] [place] [address]-
7805     

 

 [facility] [place] [facility]  [address] [id number] [place] [address]-
6690     

 

 [facility] [place] [facility]  [address] [id number] [place] [address]-
0336     

 

 [facility] [place] [facility]  [address] [id number] [place] [address]-
1353     

 

 [facility] [place] [facility]  [address] [id number] [place] [address]-
6395     

 

 [facility] [place] [facility]  [address] [id number] [place] [address]-
2736     

 

 [facility] [place] [facility]  [address] [facility] [id number] [place] [address]-
8929     

 

 [facility] [place] [facility]  [address] [id number] [place] [address]-
6742     

 

 [facility] [place] [facility]  [address] [id number] [place] [address]-
3967     

 

 [facility] [place] [facility]  [address] [id number] [place] [address]-
1886     

 

 [facility] [place] [facility]  [address] [id number] [place] [address]-
4381  10 Dec, 2013   

 

 [facility] [place] [facility]  [address] [id number] [place] [address]-
7024  10 Dec, 2013   

 

 [facility]  [address] [facility] [id number] [place] [id number]-
7557     

 

 [facility]  [address] [facility] [id number] [address]-
4054     







IMMUNIZATIONS

No Known Immunizations



SOCIAL HISTORY

Never Assessed



REASON FOR VISIT





PLAN OF CARE





VITAL SIGNS





MEDICATIONS







 Medication  Instructions  Dosage  Frequency  Start Date  End Date  Duration  
Status

 

 Xanax 1 MG  Orally 3 times a day  1 tablet  8h  24 Aug, 2017        Active

 

 Norco  MG  Orally every 6 hrs  1 tablet as needed  6h  19 Sep, 2017     
   Active







RESULTS

No Results



PROCEDURES

No Known procedures



INSTRUCTIONS





MEDICATIONS ADMINISTERED

No Known Medications



MEDICAL (GENERAL) HISTORY







 Type  Description  Date

 

 Medical History  narcolepsy   

 

 Surgical History  Gallbladder removed  2015

 

 Surgical History  Hernia repair  2016

## 2018-09-02 NOTE — XMS REPORT
[facility]

 Created on: 2018



Sofiya Singh

External Reference #: [id number]

: 1955

Sex: Female



Demographics







 Address  [address] [place]  [address]

 

 Preferred Language  Unknown

 

 Marital Status  Unknown

 

 [employer] Affiliation  Unknown

 

 Race  Unknown

 

 Ethnic Group  Unknown





Author







 Author  NAHUN SNYDER

 

 Organization  [facility]

 

 Address  [address] [place]  [address]



 

 Phone  [phone]







Care Team Providers







 Care Team Member Name  Role  Phone

 

 NAHUN SNYDER  Unavailable  [phone]







PROBLEMS







 Type  Condition  ICD9-CM Code  EGD37-RO Code  Onset Dates  Condition Status  
SNOMED Code

 

 Problem  ADHD (attention deficit hyperactivity disorder), inattentive type     
F90.0     Active  71046889

 

 Problem  Arthritis     M19.90     Active  9713205

 

 Problem  Positive skin test for tuberculosis     R76.11     Active  248422843

 

 Problem  Lumbago with sciatica, left side     M54.42     Active  012360410

 

 Problem  Excessive daytime sleepiness     G47.19     Active  859354658614

 

 Problem  Primary narcolepsy without cataplexy     G47.419     Active  
77983134844523

 

 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 
    Active  [id number]







ALLERGIES







 Substance  Reaction  Event Type  Date  Status

 

 Penicillins  Unknown  Non Drug Allergy    Active







ENCOUNTERS







 Encounter  Location  Date  Diagnosis

 

 [place] [id number] [place] [address] 00180-
0631  14 Mar, 2018  ADHD (attention deficit hyperactivity disorder), 
inattentive type F90.0 ; Lumbago with sciatica, left side M54.42 and Arthritis 
M19.90

 

 [place] [id number] [place] [address] 52946-
9903    ADHD (attention deficit hyperactivity disorder), 
inattentive type F90.0 and Lumbar neuritis M54.16

 

 [place] [address] [address]-
3072     

 

 [place] [id number] [place] [address] 49550-
1675  16 2018  Lumbar neuritis M54.16

 

 [place] [address] [address]-
4896    Low back pain M54.5

 

 [facility]  [id number] [place] [address] 37545-
6804    ADHD (attention deficit hyperactivity disorder), 
inattentive type F90.0

 

 [facility]  [id number] [place] [address] 36248-
9634    Positive skin test for tuberculosis R76.11

 

 [facility]  [id number] [place] [address] 53496-
6665    Positive skin test for tuberculosis R76.11

 

 [facility]  [id number] [place] [address] 97379-
6769     

 

 [facility]  [id number] [place] [address] 44270-
5249    Lumbar neuritis M54.16

 

 [facility]  [id number] [place] [address] 34841-
9659    ADHD (attention deficit hyperactivity disorder), 
inattentive type F90.0

 

 [facility]  [id number] [place] [address] 53098-
6180     

 

 [facility]  [id number] [place] [address] 99312-
2036  20 Dec, 2017  Lumbar neuritis M54.16

 

 [facility]  [id number] [place] [address] 10071-
2390  15 Dec, 2017  ADHD (attention deficit hyperactivity disorder), 
inattentive type F90.0

 

 [facility]  [address] [place] [address] 73439-
3609  12 Dec, 2017   

 

 [facility]  [id number] [place] [address] 97052-
6869  11 Dec, 2017   

 

 [facility]  [id number] [place] [address] 91178-
8731    Lumbar neuritis M54.16

 

 [facility]  [id number] [place] [address] 14108-
1233    ADHD (attention deficit hyperactivity disorder), 
inattentive type F90.0 and Primary narcolepsy without cataplexy G47.419

 

 [facility]  [id number] [place] [address] 54684-
2147  10 Nov, 2017   

 

 [facility]  [id number] [place] [address] 24006-
6752     

 

 [facility]  [id number] [place] [address] 52992-
0339  23 Oct, 2017  Lumbar neuritis M54.16 and ADHD (attention deficit 
hyperactivity disorder), inattentive type F90.0

 

 [facility]  [id number] [place] [address] 94555-
0260  12 Oct, 2017   

 

 [facility]  [id number] [place] [address] 71019-
6941  26 Sep, 2017  Lumbar neuritis M54.16 and ADHD (attention deficit 
hyperactivity disorder), inattentive type F90.0

 

 [facility]  [id number] [place] [address] 04132-
4156  19 Sep, 2017   

 

 [facility]  [id number] [place] [address] 49191-
5398  31 Aug, 2017  ADHD (attention deficit hyperactivity disorder), 
inattentive type F90.0 and Lumbar neuritis M54.16

 

 [facility]  [id number] [place] [address] 33514-
0440  24 Aug, 2017  Lumbar neuritis M54.16

 

 [facility]  [id number] [place] [address] 23418-
2987  23 Aug, 2017   

 

 [facility]  [id number] [place] [address] 43211-
2602  02 Aug, 2017  ADHD (attention deficit hyperactivity disorder), 
inattentive type F90.0 and Lumbar neuritis M54.16

 

 [facility]  [id number] [place] [address] 15559-
8358  02 Aug, 2017   

 

 [facility]  [address] [address]-
7196     

 

 [facility]  [address] [address]-
7297     

 

 [facility]  [address] [address]-
9993     

 

 [facility]  [address] [place] [address]-
8665     

 

 [facility]  [address] [place] [address]-
1479    ADHD (attention deficit hyperactivity disorder), 
inattentive type F90.0 and Lumbar neuritis M54.16

 

 [facility]  [address] [address]-
5654     

 

 [facility]  [address] [address]-
6463     

 

 [facility]  [address] [address]-
1314    Lumbar neuritis M54.16 and ADHD (attention deficit 
hyperactivity disorder), inattentive type F90.0

 

 [facility]  [address], [place] [address]-
9500     

 

 [facility]  [address] [address]-
2690  10 May, 2017  Lumbar neuritis M54.16 and ADHD (attention deficit 
hyperactivity disorder), inattentive type F90.0

 

 [facility]  [address] [address]-
6555  03 May, 2017   

 

 [facility]  [address] [place] [address]-
5520  01 May, 2017   

 

 [facility]  [id number] [address]-
5897    Narcolepsy due to underlying condition without cataplexy 
G47.429 and Lumbago with sciatica, left side M54.42

 

 [facility]  [id number] [place] [address] 52740-
0662    Lumbar neuritis M54.16 and ADHD (attention deficit 
hyperactivity disorder), inattentive type F90.0

 

 [facility]  [id number] [place] [address] 69758-
2081  31 Mar, 2017   

 

 [facility]  [id number] [place] [address] 12486-
2197  15 Mar, 2017  Lumbar neuritis M54.16 and ADHD (attention deficit 
hyperactivity disorder), inattentive type F90.0

 

 [facility]  [id number] [place] [address] 87684-
3140  15 Mar, 2017   

 

 [facility]  [id number] [place] [address] 45732-
3835  06 Mar, 2017   

 

 [facility]  [id number] [place] [address] 24709-
3810  15 Feb, 2017  ADHD (attention deficit hyperactivity disorder), 
inattentive type F90.0

 

 [facility]  [id number] [place] [address] 74038-
9601  15 Feb, 2017  Lumbar neuritis M54.16

 

 [facility]  [address] [place] [address] 58997-
7157     

 

 [facility]  [id number] [place] [address] 71649-
1668     

 

 [facility]  [id number] [place] [address] 27612-
6373    Attention deficit hyperactivity disorder (ADHD), 
predominantly inattentive type F90.0

 

 [facility]  [address] [place] [address] 76878-
3141     

 

 [facility]  [address] [place] [address] 88266-
4747  10 Niall, 2017   

 

 [facility]  [id number] [address]-
6302     

 

 [facility]  [id number] [place] [address] 83212-
7415  22 Dec, 2016  Attention deficit hyperactivity disorder (ADHD), 
predominantly inattentive type F90.0

 

 [facility]  [id number] [place] [address] 47671-
7812  12 Dec, 2016   

 

 [facility]  [id number] [place] [address] 02123-
7346  07 Dec, 2016   

 

 [facility]  [id number] [place] [address] 11246-
0542  05 Dec, 2016   

 

 [facility]  [id number] [place] [address] 93206-
2457  05 Dec, 2016  Low TSH level R94.6

 

 [facility]  [id number] [place] [address] 12782-
6560  05 Dec, 2016   

 

 [facility]  [id number] [place] [address] 55131-
6626  02 Dec, 2016   

 

 [facility]  [id number] [place] [address] 75326-
2528  10 Nov, 2016   

 

 [facility]  [id number] [place] [address] 41368-
3213  10 Nov, 2016   

 

 [facility]  [address] [place] [address] 88676-
0988     

 

 [facility]  [id number] [place] [address] 07776-
6114  18 Oct, 2016  Low TSH level R94.6

 

 [facility]  [id number] [place] [address] 29020-
4324  17 Oct, 2016  Excessive daytime sleepiness G47.19 and ADHD (attention 
deficit hyperactivity disorder), inattentive type F90.0

 

 [facility]  [address] [place] [address] 32284-
1297  13 Oct, 2016   

 

 [facility]  [address] [place] [address]-
3110  12 Oct, 2016   

 

 [facility]  [address] [id number] [place] [address]-
0458  03 Oct, 2016   

 

 [facility] [place] [facility]  [address] [id number] [place] [address]-
2396  28 Sep, 2016   

 

 [facility]  [address] [id number] [place] [address]-
7063  14 Sep, 2016   

 

 [facility]  [address] [id number] [place] [address]-
2499  01 Sep, 2016   

 

 [facility]  [address] [id number] [place] [address], [place] [address]-
2813  17 Aug, 2016   

 

 [facility]  [address] [id number] [place] [address]-
5399  04 Aug, 2016   

 

 [facility]  [address] [id number] [place] [address]-
0484  03 Aug, 2016   

 

 [facility]  [address] [id number] [place] [address]-
9742    Lumbar neuritis M54.16

 

 [facility]  [address] [id number] [place] [address]-
4302     

 

 [facility]  [address] [facility] [id number] [place] [address]-
6143     

 

 [facility]  [address] [id number] [place] [address]-
6388    Lumbar neuritis M54.16

 

 \[facility]  [address] [id number] [place] [address]-
7463     

 

 [facility] [place] [facility]  [address] [facility] [id number] [place] [address]-
0843     

 

 [facility] [place] [facility]  [address] [facility] [id number] [place] [address]-
6549  26 May, 2016  Lumbar neuritis M54.16

 

 [facility] [place] [facility]  [id number] [place] [address]-
6483  25 May, 2016   

 

 [facility]  [id number] [place] [address] 34865-
8919  10 May, 2016   

 

 [facility]  [id number] [place] [address], [place] [address]-
9134  28 2016  Lumbar neuritis M54.16

 

 [facility]  [id number] [place] [address], [place] [address]-
1271     

 

 [facility]  [id number] [place] [address] 40111-
6726  11 2016   

 

 [facility]  [id number] [place] [address], [place] [address]-
1307  23 Mar, 2016   

 

 [facility]  [id number] [place] [address] 17768-
1855  14 Mar, 2016   

 

 [facility]  [id number] [place] [address] 10239-
3786  14 Mar, 2016   

 

 [facility]  [address] [place] [address] 86234-
6158  11 Mar, 2016   

 

 [facility]  [id number] [place] [address] 44010-
9513  24 2016   

 

 [facility]  [id number] [place] [address] 84410-
1701  22 2016  Inguinal hernia, right K40.90

 

 [facility]  [id number] [place] [address] 72669-
6516  17 2016   

 

 [facility]  [address] [place] [address] 73730-
5250  15 2016  Low back pain M54.5 and Sciatica, unspecified side M54.30

 

 [facility]  [address], [place] [address]-
7599     

 

 [facility]  [id number] [place] [address] 62737-
9132     

 

 [facility]  [address], [place] [address]-
6661  30 Dec, 2015   

 

 [facility]  [id number] [place] [address] 42309-
5086  28 Dec, 2015   

 

 [facility]  [id number] [place] [address] 92923-
5764  02 Dec, 2015   

 

 [facility]  [id number] [place] [address] 77208-
9836     

 

 [facility]  [id number] [place] [address] 99328-
0959     

 

 [facility]  [id number] [place] [address] 48280-
2294     

 

 [facility]  [id number] [place] [address] 53191-
9913     

 

 [facility]  [id number] [place] [address] 52887-
1698  26 Oct, 2015   

 

 [facility]  [address] [place] [address] 02231-
0335  22 Oct, 2015  Encounter for immunization Z23

 

 [facility]  [id number] [place] [address] 87831-
5524  07 Oct, 2015   

 

 [facility]  [address] [place] [address] 15996-
5081  05 Oct, 2015   

 

 [facility]  [address], [place] [address]-
1367  09 Sep, 2015   

 

 [facility]  [id number] [place] [address] 34823-
4981  08 Sep, 2015   

 

 [facility]  [id number] [place] [address] 55530-
4726  19 Aug, 2015  Lumbago 724.2

 

 [facility]  [address] [place] [address] 93275-
2827  12 Aug, 2015   

 

 [facility] [place] [facility]  [address] [id number] [place] [address]-
8108    Lumbago 724.2

 

 [facility] [place] [facility]  [address] [id number] [place] [address]-
8055     

 

 [facility] [place] [facility]  [address] [facility] [id number] [place] [address]-
6811     

 

 [facility] [place] [facility]  [address] [id number] [place] [address]-
7822     

 

 [facility] [place] [facility]  [address] [id number] [place] [address]-
6491     

 

 [facility] [place] [facility]  [address] [id number] [place] [address]-
1866     

 

 [facility] [place] [facility]  [address] [facility] [id number] [place] [address]-
9563  22 May, 2015   

 

 [facility] [place] [facility]  [address] [id number] [place] [address]-
8427     

 

 [facility] [place] [facility]  [address] [id number] [place] [address]-
5258     

 

 [facility] [place] [facility]  [address] [facility] [id number] [place] [address]-
8589  30 Mar, 2015   

 

 [facility] [place] [facility]  [address] [id number] [place] [address]-
8728  30 Mar, 2015   

 

 [facility] [place] [facility]  [address] [id number] [address]-
3571  02 Mar, 2015   

 

 [facility] [place] [facility]  [address] [id number] [place] [address]-
4959  02 Mar, 2015   

 

 [facility] [place] [facility]  [address] [facility] [id number] [place] [address]-
7515     

 

 [facility] [place] [facility]  [address] [facility] [id number] [place] [address]-
6616     

 

 [facility] [place] [facility]  [address] [id number] [place] [address]-
8912  ,    

 

 [facility]  [address] [id number] [place] [address]-
3969     

 

 [facility] [place] [facility]  [address] [id number] [place] [address]-
1870     

 

 [facility] [place] [facility]  [address] [id number] [place] [address]-
1429     

 

 [facility] [place] [facility]  [address] [id number] [place] [address]-
8422  31 Dec, 2014   

 

 [facility] [place] [facility]  [address] [id number] [place] [address]-
3494  31 Dec, 2014   

 

 [facility] [place] [facility]  [address] [facility] [id number] [place] [address]-
4789  22 Dec, 2014   

 

 [facility] [place] [facility]  [address] [id number] [place] [address]-
9714  22 Dec, 2014   

 

 [facility] [place] [facility]  [address] [id number] [place] [address]-
3289  08 Dec, 2014   

 

 [facility] [place] [facility]  [address] [id number] [place] [address]-
1942  08 Dec, 2014   

 

 [facility] [place] [facility]  [address] [facility] [id number] [place] [address]-
0759  10 Nov, 2014   

 

 [facility] [place] [facility]  [address] [id number] [place] [address]-
7570  10 Nov, 2014   

 

 [facility] [place] [facility]  [address] [id number] [place] [address]-
6809  13 Oct, 2014   

 

 [facility] [place] [facility]  [address] [id number] [place] [address]-
9790  13 Oct, 2014   

 

 [facility] [place] [facility]  [address] [id number] [place] [address]-
7656  01 Oct, 2014   

 

 [facility] [place] [facility]  [address] [id number] [place] [address]-
8178  01 Oct, 2014   

 

 [facility] [place] [facility]  [address] [id number] [place] [address]-
7751  15 Sep, 2014   

 

 [facility] [place] [facility]  [address] [id number] [place] [address]-
6373  15 Sep, 2014   

 

 [facility] [place] [facility]  [address] [id number] [place] [address]-
7868  18 Aug, 2014   

 

 [facility] [place] [facility]  [address] [id number] [place] [address]-
3415  18 Aug, 2014   

 

 [facility] [place] [facility]  [address] [id number] [place] [address]-
2374  18 Aug, 2014   

 

 [facility] [place] [facility]  [address] [id number] [place] [address]-
0639  18 Aug, 2014   

 

 [facility] [place] [facility]  [address] [id number] [place] [address]-
7484     

 

 [facility] [place] [facility]  [address] [id number] [place] [address]-
0396     

 

 [facility] [place] [facility]  [address] [id number] [place] [address]-
5152     

 

 [facility] [place] [facility]  [address] [id number] [place] [address]-
6583     

 

 [facility] [place] [facility]  [address] [id number] [place] [address]-
2551     

 

 [facility] [place] [facility]  [address] [id number] [place] [address]-
3282     

 

 [facility] [place] [facility]  [address] [id number] [place] [address]-
6434  30 May, 2014   

 

 [facility] [place] [facility]  [address] [id number] [place] [address]-
6037  28 May, 2014   

 

 [facility] [place] [facility]  [address] [id number] [place]
7943  28 May, 2014   

 

 [facility] [place] [facility]  [address] [id number] [place] [address]-
9790  30 2014   

 

 [facility] [place] [facility]  [address] [id number] [place] [address]-
9427  30 2014   

 

 [facility] [place] [facility]  [address] [id number] [place] [address]-
9885     

 

 [facility] [place] [facility]  [address] [id number] [place] [address]-
3631     

 

 [facility] [place] [facility]  [address] [id number] [place] [address]-
8798     

 

 [facility] [place] [facility]  [address] [id number] [place] [address]-
2275     

 

 [facility] [place] [facility]  [address] [id number] [place] [address]-
4383     

 

 [facility] [place] [facility]  [address] [id number] [place] [address]-
3391     

 

 [facility] [place] [facility]  [address] [id number] [place] [address]-
4954     

 

 [facility] [place] [facility]  [address] [id number] [place] [address]-
9468     

 

 [facility] [place] [facility]  [address] [id number] [place] [address]-
0511     

 

 [facility] [place] [facility]  [address] [id number] [place] [address]-
7205  28 Mar, 2014   

 

 [facility] [place] [facility]  [address] [id number] [place] [address]-
3965  27 Mar, 2014   

 

 [facility] [place] [facility]  [address] [id number] [place] [address]-
7170  27 Mar, 2014   

 

 [facility] [place] [facility]  [address] [id number] [place] [address]-
1471  26 Mar, 2014   

 

 [facility] [place] [facility]  [address] [id number] [place] [address]-
5683  26 Mar, 2014   

 

 [facility] [place] [facility]  [address] [id number] [place] [address]-
8292     

 

 [facility] [place] [facility]  [address] [id number] [place] [address]-
[address]     

 

 [facility] [place] [facility]  [address] [id number] [place] [address]-
4272     

 

 [facility] [place] [facility]  [address] [id number] [place] [address]-
9126     

 

 [facility] [place] [facility]  [address] [id number] [place] [address]-
[address]     

 

 [facility] [place] [facility]  [address] [id number] [place] [address]-
4458     

 

 [facility] [place] [facility]  [address] [id number] [place] [address]-
1144     

 

 [facility] [place] [facility]  [address] [id number] [place] [address]-
27[address]     

 

 [facility] [place] [facility]  [address] [id number] [place] [address]-
4155     

 

 [facility] [place] [facility]  [address] [id number] [place], [place] [address]     

 

 [facility] [place] [facility]  [address] [id number] [place] [address]-
[address]     

 

 [facility] [place] [facility]  [address] [id number], [place] [address]-
[address]     

 

 [facility] [place] [facility]  [address] [id number] [place] [address]-
[address]     

 

 [facility] [place] [facility]  [address] [id number] [place] [address]-
[address]90  10 Dec, 2013   

 

 [facility] [place] [facility]  [address] [id number], [place] [address]-
7773  10 Dec, 2013   

 

 [facility] [place] [facility]  [address] [id number], [place] [address]-
2526     

 

 [facility]  [address] [facility] [id number] [place] [address]-
7666     







IMMUNIZATIONS

No Known Immunizations



SOCIAL HISTORY

Never Assessed



REASON FOR VISIT

Pain management (chronic)- Galina GARZA



PLAN OF CARE





VITAL SIGNS







 Height  59 in  2017

 

 Weight  118.3 lbs  2017

 

 Temperature  98.2 degrees Fahrenheit  2017

 

 Heart Rate  82 bpm  2017

 

 Respiratory Rate  18   2017

 

 BMI  23.89 kg/m2  2017

 

 Blood pressure systolic  124 mmHg  2017

 

 Blood pressure diastolic  78 mmHg  2017







MEDICATIONS







 Medication  Instructions  Dosage  Frequency  Start Date  End Date  Duration  
Status

 

 Xanax 1 MG  Orally 3 times a day  1 tablet  8h  24 Aug, 2017        Active

 

 Tramadol HCl 50 MG  Orally every 6 hrs  1 tablet as needed  6h  02 Dec, 2016  
   28 days  Active

 

 Norco  MG  Orally every 6 hrs  1 tablet as needed  6h  13 2017     
   Active

 

 Adderall XR 30 MG  Orally Once a day  1 capsule in the morning  24h       28 days  Active







RESULTS







 Name  Result  Date  Reference Range

 

 [employer]     2017   







PROCEDURES







 Procedure  Date Ordered  Result  Body Site

 

 No Charge  2017      







INSTRUCTIONS





MEDICATIONS ADMINISTERED

No Known Medications



MEDICAL (GENERAL) HISTORY







 Type  Description  Date

 

 Medical History  narcolepsy   

 

 Surgical History  Gallbladder removed  2015

 

 Surgical History  Hernia repair  2016

## 2018-09-02 NOTE — XMS REPORT
[facility]

 Created on: 2015



Sofiya Singh

External Reference #: [id number]

: 1955

Sex: Female



Demographics







 Address  [address] [place]  [address]

 

 Home Phone  [phone]

 

 Preferred Language  Unknown

 

 Marital Status  Unknown

 

 Islam Affiliation  Unknown

 

 Race  White

 

 Ethnic Group  Not  or 





Author







 Author  NAHUN SNYDER

 

 Organization  [employer]

 

 Address  Unknown

 

 Phone  Unavailable







Care Team Providers







 Care Team Member Name  Role  Phone

 

 NAHUN SNYDER  CP  Unavailable



                                                                



Allergies

          No Known Allergies                                                   
                                     



Problems

          





 Problem Type  Condition  Code  Onset Dates  Condition Status

 

 Problem  Screening examination for pulmonary tuberculosis  V74.1     Active

 

 Problem  Pain in joint, pelvic region and thigh  719.45     Active

 

 Problem  Pneumonia, organism unspecified  486     Active

 

 Problem  Family history of ischemic heart disease  V17.3     Active

 

 Problem  Family history of other cardiovascular diseases  V17.49     Active

 

 Problem  Unspecified arthropathy, site unspecified  716.90     Active

 

 Problem  Anxiety state, unspecified  300.00     Active

 

 Problem  Lumbago  724.2     Active

 

 Problem  Family history of malignant neoplasm of breast  V16.3     Active

 

 Problem  Family history of diabetes mellitus  V18.0     Active



                                                                               
                                                                               
                    



Medications

          No Known Medications                                                 
                             



Results

          No Known Results                                                     
               



Summary Purpose

          [employer] Submission

## 2018-09-02 NOTE — XMS REPORT
[facility]

 Created on: 2018



Sofiya Singh

External Reference #: [id number]

: 1955

Sex: Female



Demographics







 Address  [address] [place]  [address]

 

 Preferred Language  Unknown

 

 Marital Status  Unknown

 

 Mu-ism Affiliation  Unknown

 

 Race  Unknown

 

 Ethnic Group  Unknown





Author







 Author  NAHUN SNYDER

 

 Organization  [facility]

 

 Address  [address] [place]  [address]



 

 Phone  [phone]







Care Team Providers







 Care Team Member Name  Role  Phone

 

 NAHUN SNYDER  Unavailable  [phone]







PROBLEMS







 Type  Condition  ICD9-CM Code  NLP77-SL Code  Onset Dates  Condition Status  
SNOMED Code

 

 Problem  ADHD (attention deficit hyperactivity disorder), inattentive type     
F90.0     Active  27399193

 

 Problem  Arthritis     M19.90     Active  7124444

 

 Problem  Positive skin test for tuberculosis     R76.11     Active  970519487

 

 Problem  Lumbago with sciatica, left side     M54.42     Active  219902804

 

 Problem  Excessive daytime sleepiness     G47.19     Active  605240743019

 

 Problem  Primary narcolepsy without cataplexy     G47.419     Active  
[id number]

 

 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 
    Active  [id number]







ALLERGIES

No Information



ENCOUNTERS







 Encounter  Location  Date  Diagnosis

 

 [place] [id number] [place] [address] 09381-
6608  14 May, 2018   

 

 [place] [id number] [place] [address] 36964-
9835  14 Mar, 2018  ADHD (attention deficit hyperactivity disorder), 
inattentive type F90.0 ; Lumbago with sciatica, left side M54.42 and Arthritis 
M19.90

 

 [facility]  [id number] [place] [address] 68578-
2509    ADHD (attention deficit hyperactivity disorder), 
inattentive type F90.0 and Lumbar neuritis M54.16

 

 [place] [id number] [place] [address] 81348-
3239     

 

 [place] [id number] [place] [address] 32534-
1006    Lumbar neuritis M54.16

 

 [place] [address] [place] [address] 95903-
8009  16 2018  Low back pain M54.5

 

 [facility]  [id number] [place] [address] 56056-
6466    ADHD (attention deficit hyperactivity disorder), 
inattentive type F90.0

 

 [facility]  [id number] [place] [address] 29747-
1195    Positive skin test for tuberculosis R76.11

 

 [facility]  [id number] [place] [address] 83614-
0574    Positive skin test for tuberculosis R76.11

 

 [facility]  [id number] [place] [address] 03640-
9020     

 

 [facility]  [id number] [place] [address] 82257-
3140    Lumbar neuritis M54.16

 

 [facility]  [id number] [place] [address] 68017-
9458    ADHD (attention deficit hyperactivity disorder), 
inattentive type F90.0

 

 [facility]  [id number] [place] [address] 30799-
4133     

 

 [facility]  [id number] [place] [address] 92517-
8311  20 Dec, 2017  Lumbar neuritis M54.16

 

 [facility]  [address] [place] [address] 51673-
8451  15 Dec, 2017  ADHD (attention deficit hyperactivity disorder), 
inattentive type F90.0

 

 [facility]  [id number] [place] [address] 70085-
1276  12 Dec, 2017   

 

 [facility]  [id number] [place] [address] 79366-
7101  11 Dec, 2017   

 

 [facility]  [id number] [place] [address] 02737-
5098    Lumbar neuritis M54.16

 

 [facility]  [id number] [place] [address] 64365-
3216    ADHD (attention deficit hyperactivity disorder), 
inattentive type F90.0 and Primary narcolepsy without cataplexy G47.419

 

 [facility]  [id number] [place] [address] 45162-
7444  10 Nov, 2017   

 

 [facility]  [id number] [place] [address] 48380-
7677     

 

 [facility]  [id number] [place] [address] 61013-
8472  23 Oct, 2017  Lumbar neuritis M54.16 and ADHD (attention deficit 
hyperactivity disorder), inattentive type F90.0

 

 [facility]  [id number] [place] [address] 71707-
5104  12 Oct, 2017   

 

 [facility]  [id number] [place] [address] 53791-
8744  26 Sep, 2017  Lumbar neuritis M54.16 and ADHD (attention deficit 
hyperactivity disorder), inattentive type F90.0

 

 [facility]  [id number] [place] [address] 88709-
4030  19 Sep, 2017   

 

 [facility]  [id number] [place] [address] 43747-
2557  31 Aug, 2017  ADHD (attention deficit hyperactivity disorder), 
inattentive type F90.0 and Lumbar neuritis M54.16

 

 [facility]  [id number] [place] [address] 59085-
8221  24 Aug, 2017  Lumbar neuritis M54.16

 

 [facility]  [id number] [place] [address] 41767-
7391  23 Aug, 2017   

 

 [facility]  [id number] [place] [address] 07473-
2722  02 Aug, 2017  ADHD (attention deficit hyperactivity disorder), 
inattentive type F90.0 and Lumbar neuritis M54.16

 

 [facility]  [address] [place] [address]-
1364  02 Aug, 2017   

 

 [facility]  [address] [address]-
4746     

 

 [facility]  [address] [address]3886-
7360     

 

 [facility]  [address] [address]-
3407     

 

 [facility]  [address] [address]-
0075     

 

 [facility]  [id number] [place] [address] 53738-
0460    ADHD (attention deficit hyperactivity disorder), 
inattentive type F90.0 and Lumbar neuritis M54.16

 

 [facility]  [id number] [address]-
7377     

 

 [facility]  [id number] [address]-
5500     

 

 [facility]  [address] [place] [address] 97594-
8980    Lumbar neuritis M54.16 and ADHD (attention deficit 
hyperactivity disorder), inattentive type F90.0

 

 [facility]  [address] [address]-
8749     

 

 [facility]  [address] [address]-
9600  10 May, 2017  Lumbar neuritis M54.16 and ADHD (attention deficit 
hyperactivity disorder), inattentive type F90.0

 

 [facility]  [address] 287514-
7178  03 May, 2017   

 

 [facility]  [address] 185372-
4473  01 May, 2017   

 

 [facility]  [address] [address]-
9752    Narcolepsy due to underlying condition without cataplexy 
G47.429 and Lumbago with sciatica, left side M54.42

 

 [facility]  [id number] [place] [address] 80032-
4976    Lumbar neuritis M54.16 and ADHD (attention deficit 
hyperactivity disorder), inattentive type F90.0

 

 [facility]  [id number] [place] [address] 15914-
2379  31 Mar, 2017   

 

 [facility]  [id number] [place] [address] 60862-
7252  15 Mar, 2017  Lumbar neuritis M54.16 and ADHD (attention deficit 
hyperactivity disorder), inattentive type F90.0

 

 [facility]  [id number] [place] [address] 77897-
0943  15 Mar, 2017   

 

 [facility]  [id number] [place] [address] 12678-
2311  06 Mar, 2017   

 

 [facility]  [id number] [place] [address] 86947-
7437  15 Feb, 2017  ADHD (attention deficit hyperactivity disorder), 
inattentive type F90.0

 

 [facility]  [id number] [place] [address] 65549-
3621  15 Feb, 2017  Lumbar neuritis M54.16

 

 [facility]  [id number] [place] [address] 99791-
8764  08 2017   

 

 [facility]  [id number] [place] [address] 37503-
5944  07 2017   

 

 [facility]  [id number] [place] [address] 15471-
0869    Attention deficit hyperactivity disorder (ADHD), 
predominantly inattentive type F90.0

 

 [facility]  [id number] [place] [address] 11151-
6319     

 

 [facility]  [id number] [place] [address] 45075-
5406  10 Niall, 2017   

 

 [facility]  [address] [place] [address] 08485-
1191     

 

 [facility]  [id number] [place] [address] 26541-
6150  22 Dec, 2016  Attention deficit hyperactivity disorder (ADHD), 
predominantly inattentive type F90.0

 

 [facility]  [id number] [place] [address] 49624-
6269  12 Dec, 2016   

 

 [facility]  [id number] [place] [address] 18882-
2970  07 Dec, 2016   

 

 [facility]  [id number] [place] [address] 91301-
2177  05 Dec, 2016   

 

 [facility]  [id number] [place] [address] 13363-
1391  05 Dec, 2016  Low TSH level R94.6

 

 [facility]  [id number] [place] [address] 87469-
3205  05 Dec, 2016   

 

 [facility]  [id number] [place] [address] 27193-
0656  02 Dec, 2016   

 

 [facility]  [id number] [place] [address] 89217-
4168  10 Nov, 2016   

 

 [facility]  [id number] [place] [address] 34371-
5910  10 Nov, 2016   

 

 [facility]  [id number] [place] [address] 89662-
9982     

 

 [facility]  [address] [place] [address] 66476-
9583  18 Oct, 2016  Low TSH level R94.6

 

 [facility]  [id number] [place] [address] 50221-
5923  17 Oct, 2016  Excessive daytime sleepiness G47.19 and ADHD (attention 
deficit hyperactivity disorder), inattentive type F90.0

 

 [facility]  [id number] [place] [address] 06852-
0292  13 Oct, 2016   

 

 [facility]  [address] [facility] [id number] [place] [address]-
6147  12 Oct, 2016   

 

 [facility] [place] [facility]  [address] [facility] [id number] [place] [address]-
4106  03 Oct, 2016   

 

 [facility]  [address] [facility] [id number] [place] [address]-
5120  28 Sep, 2016   

 

 [facility] [place] [facility]  [address] [facility] [id number] [place] [address], [place] [address]-
6810  14 Sep, 2016   

 

 [facility] [place] [facility]  [address] [facility] [id number] [place] [address]-
9425  01 Sep, 2016   

 

 [facility] [place] [facility]  [id number] [place] [address], [place] [address]-
5626  17 Aug, 2016   

 

 [facility] [place] [facility]  [id number] [place] [address], [place] [address]-
3402  04 Aug, 2016   

 

 [facility] [place] [facility]  [address] [facility] [id number] [place] [address], [place] [address]-
6054  03 Aug, 2016   

 

 [facility]  [id number] [address]-
3569    Lumbar neuritis M54.16

 

 [facility]  [id number], [place] [address]-
1921     

 

 [facility] [place] [facility]  [address], [place] [address]-
3770     

 

 [facility] [place] [facility]  [address] [facility] [id number] [address]-
2500    Lumbar neuritis M54.16

 

 [facility] [place] [facility]  [address] [facility] [id number] [place] 314593-
2290     

 

 [facility] [place] [facility]  [address] [facility] [id number] [place] [address]-
2941     

 

 [facility] [place] [facility]  [address] [place] [address]-
6992  26 May, 2016  Lumbar neuritis M54.16

 

 [facility]  [address] [place] [address]-
3771  25 May, 2016   

 

 [facility]  [id number] [place] [address] 81473-
5605  10 May, 2016   

 

 [facility]  [id number] [place] [address] 28933-
7290    Lumbar neuritis M54.16

 

 [facility]  [id number] [place] [address] 57921-
8163     

 

 [facility]  [id number] [place] [address] 07320-
7622     

 

 [facility]  [id number] [place] [address] 92915-
3231  23 Mar, 2016   

 

 [facility]  [id number] [place] [address] 08578-
8225  14 Mar, 2016   

 

 [facility]  [id number] [place] [address] 25798-
3718  14 Mar, 2016   

 

 [facility]  [id number] [place] [address] 02436-
5363  11 Mar, 2016   

 

 [facility]  [id number] [address]-
4496  24 2016   

 

 [facility]  [id number] [place] [address] 42247-
9532  22 2016  Inguinal hernia, right K40.90

 

 [facility]  [address] [place] [address]-
9135  17 2016   

 

 [facility]  [id number] [place] [address] 08454-
4315  15 2016  Low back pain M54.5 and Sciatica, unspecified side M54.30

 

 [facility]  [address], [place] [address]-
4689     

 

 [facility]  [address] [id number] [place] [address]-
1155     

 

 [facility]  [address] [facility] [id number] [place] [address], [place] [address]-
1021  30 Dec, 2015   

 

 [facility] [place] [facility]  [address] [facility] [id number] [place] [address]-
7307  28 Dec, 2015   

 

 [facility]  [address] [facility] [id number] [place] [address], [place] [address]-
4793  02 Dec, 2015   

 

 [facility] [place] [facility]  [address] [facility] [id number] [place] [address]-
1307     

 

 [facility]  [id number] [place] [address], [place] [address]-
8892     

 

 [facility]  [id number] [place] [address], [place] [address]-
3782     

 

 [facility]  [id number] [place] [address], [place] [address]-
6290     

 

 [facility]  [id number] [place] [address] 09302-
6905  26 Oct, 2015   

 

 [facility]  [id number] [place] [address] 00642-
0583  22 Oct, 2015  Encounter for immunization Z23

 

 [facility]  [address] [address]-
1765  07 Oct, 2015   

 

 [facility]  [id number], [place] [address]-
1213  05 Oct, 2015   

 

 [facility]  [id number], [place] [address]-
6478  09 Sep, 2015   

 

 [facility]  [id number] [place] [address]-
5945  08 Sep, 2015   

 

 [facility]  [id number], [place] [address]-
4920  19 Aug, 2015  Lumbago 724.2

 

 [facility]  [id number] [place] [address]-
5906  12 Aug, 2015   

 

 [facility] [place] [facility]  [address] [id number] [place] [address]-
2380    Lumbago 724.2

 

 [facility] [place] [facility]  [address] [id number] [place] [address]-
8846     

 

 [facility] [place] [facility]  [address] [id number] [place] [address]-
8455     

 

 [facility] [place] [facility]  [address] [id number] [place] [address]-
5846     

 

 [facility] [place] [facility]  [address] [id number] [place] [address]-
2761     

 

 [facility] [place] [facility]  [address] [id number] [place] [address]-
5993     

 

 [facility] [place] [facility]  [address] [id number] [place] [address]-
1857  22 May, 2015   

 

 [facility] [place] [facility]  [address] [id number] [place] [address]-
1490     

 

 [facility] [place] [facility]  [address] [id number] [place] [address]-
7628     

 

 [facility] [place] [facility]  [address] [facility] [id number] [place] [address]-
4300  30 Mar, 2015   

 

 [facility] [place] [facility]  [address] [id number] [place] [address]-
1141  30 Mar, 2015   

 

 [facility] [place] [facility]  [address] [id number] [place] [address]-
5744  02 Mar, 2015   

 

 [facility] [place] [facility]  [address] [id number] [place] [address]-
9886  02 Mar, 2015   

 

 [facility] [place] [facility]  [address] [id number] [place] [address]-
8386     

 

 [facility] [place] [facility]  [address] [id number] [place] [address]-
7600     

 

 [facility] [place] [facility]  [address] [id number] [place] [address]-
7967     

 

 [facility] [place] [facility]  [address] [id number] [place] [address]-
3306     

 

 [facility] [place] [facility]  [address] [facility] [id number] [place] [address]-
3893     

 

 [facility] [place] [facility]  [address] [id number] [place] [address]-
4094     

 

 [facility] [place] [facility]  [address] [id number] [place] [address]-
8175  31 Dec, 2014   

 

 [facility] [place] [facility]  [address] [id number] [place] [address]-
2213  31 Dec, 2014   

 

 [facility] [place] [facility]  [address] [id number] [place] [address]-
7790  22 Dec, 2014   

 

 [facility] [place] [facility]  [address] [id number] [place] [address]-
6514  22 Dec, 2014   

 

 [facility] [place] [facility]  [address] [id number] [place] [address]-
2083  08 Dec, 2014   

 

 [facility] [place] [facility]  [address] [facility] [id number] [place] [address]-
4459  08 Dec, 2014   

 

 [facility] [place] [facility]  [address] [id number] [place] [address]-
3919  10 Nov, 2014   

 

 [facility] [place] [facility]  [address] [id number] [address]-
0169  10 Nov, 2014   

 

 [facility] [place] [facility]  [address] [id number] [place] [address]-
2607  13 Oct, 2014   

 

 [facility] [place] [facility]  [address] [id number] [place] [address]-
9183  13 Oct, 2014   

 

 [facility] [place] [facility]  [address] [facility] [id number] [place] [address]-
2329  01 Oct, 2014   

 

 [facility] [place] [facility]  [address] [id number] [place] [address]-
3611  01 Oct, 2014   

 

 [facility] [place] [facility]  [address] [id number] [place] [address]-
9550  15 Sep, 2014   

 

 [facility] [place] [facility]  [address] [id number] [place]
1033  15 Sep, 2014   

 

 [facility] [place] [facility]  [address] [id number] [place] [address]-
9511  18 Aug, 2014   

 

 [facility] [place] [facility]  [address] [id number] [place] [address]-
3013  18 Aug, 2014   

 

 [facility] [place] [facility]  [address] [id number] [place] [address]-
6291  18 Aug, 2014   

 

 [facility] [place] [facility]  [address] [id number] [place] [address]-
7304  18 Aug, 2014   

 

 [facility] [place] [facility]  [address] [id number] [place] [address]-
8122     

 

 [facility] [place] [facility]  [address] [id number] [place] [address]-
9900     

 

 [facility] [place] [facility]  [address] [id number] [place] [address]-
0466     

 

 [facility] [place] [facility]  [address] [id number] [place] [address]-
4175     

 

 [facility] [place] [facility]  [address] [id number] [place] [address]-
4419     

 

 [facility] [place] [facility]  [address] [id number] [place] [address]-
9432     

 

 [facility] [place] [facility]  [address] [id number] [place] [address]-
7420  30 May, 2014   

 

 [facility] [place] [facility]  [address] [id number] [place]-
5033  28 May, 2014   

 

 [facility] [place] [facility]  [address] [id number] [place] [address]-
2498  28 May, 2014   

 

 [facility] [place] [facility]  [address] [id number] [place] [address]-
3755     

 

 [facility] [place] [facility]  [address] [id number] [place] [address]-
6595     

 

 [facility] [place] [facility]  [address] [id number] [place] [address]-
0522     

 

 [facility] [place] [facility]  [address] [id number] [place] [address]-
5343     

 

 [facility] [place] [facility]  [address] [id number] [place] [address]-
4810     

 

 [facility] [place] [facility]  [address] [id number] [place] [address]-
5708     

 

 [facility] [place] [facility]  [address] [id number] [place] [address]-
6278     

 

 [facility] [place] [facility]  [address] [id number] [place] [address]-
[address]     

 

 [facility] [place] [facility]  [address] [id number] [place] [address]-
[address]     

 

 [facility] [place] [facility]  [address] [id number] [place] [address]-
[address]     

 

 [facility] [place] [facility]  [address] [id number] [place] [address]-
[address]     

 

 [facility] [place] [facility]  [address] [id number] [place] [address]-
[address]  28 Mar, 2014   

 

 [facility] [place] [facility]  [address] [id number] [place] [address]-
[address]  27 Mar, 2014   

 

 [facility] [place] [facility]  [address] [id number] [place] [address]-
1632  27 Mar, 2014   

 

 [facility] [place] [facility]  [address] [id number] [place] [address]-
9388  26 Mar, 2014   

 

 [facility] [place] [facility]  [address] [id number] [place] [address]-
5863  26 Mar, 2014   

 

 [facility] [place] [facility]  [address] [id number] [place] [address]-
4316     

 

 [facility] [place] [facility]  [address] [id number] [place] [address]-
7066     

 

 [facility] [place] [facility]  [address] [id number] [place] [address]-
4996     

 

 [facility] [place] [facility]  [address] [id number] [place] [address]-
9384     

 

 [facility] [place] [facility]  [address] [id number] [place] [address]-
0042     

 

 [facility] [place] [facility]  [address] [id number] [place] [address]-
6126     

 

 [facility] [place] [facility]  [address] [id number] [place] [address]-
8832     

 

 [facility] [place] [facility]  [address] [id number] [place] [address]-
8330     

 

 [facility] [place] [facility]  [address] [id number] [place] [address]-
9694     

 

 [facility] [place] [facility]  [address] [facility] [id number] [place] [address]-
0234     

 

 [facility] [place] [facility]  [address] [id number] [place] [address]-
1065     

 

 [facility] [place] [facility]  [address] [id number] [place] [address]-
8578     

 

 [facility] [place] [facility]  [address] [id number] [place] [address]-
3132     

 

 [facility] [place] [facility]  [address] [id number] [place] [address]-
7739  10 Dec, 2013   

 

 [facility] [place] [facility]  [address] [id number] [place] [address]-
8566  10 Dec, 2013   

 

 [facility]  [address] [facility] [id number] [place] [address]-
2319     

 

 [facility]  [address] [facility] [id number][place] [id number]-
2943     







IMMUNIZATIONS

No Known Immunizations



SOCIAL HISTORY

Never Assessed



REASON FOR VISIT

Controlled Med Refill 10/17/17



PLAN OF CARE





VITAL SIGNS





MEDICATIONS







 Medication  Instructions  Dosage  Frequency  Start Date  End Date  Duration  
Status

 

 Norco  MG  Orally every 6 hrs  1 tablet as needed  6h  17 Oct, 2017     
   Active

 

 Xanax 1 MG  Orally 3 times a day  1 tablet  8h  24 Aug, 2017        Active







RESULTS

No Results



PROCEDURES

No Known procedures



INSTRUCTIONS





MEDICATIONS ADMINISTERED

No Known Medications



MEDICAL (GENERAL) HISTORY







 Type  Description  Date

 

 Medical History  narcolepsy   

 

 Surgical History  Gallbladder removed  2015

 

 Surgical History  Hernia repair  2016

## 2018-09-02 NOTE — DIAGNOSTIC IMAGING REPORT
PROCEDURE: CT abdomen and pelvis with contrast.



TECHNIQUE: Multiple contiguous axial images were obtained through

the abdomen and pelvis after administration of intravenous

contrast. 



INDICATION: Right lower quadrant pain.



FINDINGS: The previous CT abdomen/pelvis exam of 2/16/2016 failed

to show any sign of an acute abnormality of the abdomen or

pelvis. In the interval since the prior study, several fluid

filled segments of small bowel have developed in the left

midabdomen. There does seem to be generalized thickening of the

wall of these segments of the small bowel. The thickened

appearance of the wall could be related to incomplete distention.

The possibility that there is an element of enteritis present

should certainly be considered, however. There is no sign of a

small bowel obstruction.



The appendix was visualized and is not abnormally thickened.



There is no pelvic mass or free fluid collection evident. The

uterus is surgically absent. The urinary bladder is grossly

unremarkable.



The liver is of lower density than usually seen and this does

suggest fatty metamorphosis. The spleen, pancreas, adrenals,

kidneys, aorta and inferior vena cava are unremarkable for an

acute abnormality. As noted previously, the gallbladder is

surgically absent. The stomach is partially filled with fluid and

consequently difficult to assess. 



The lung bases are clear. 



The bone windows show no evidence for a fracture or for a

destructive lesion.



IMPRESSION:

1. The fluid-filled segments of small bowel in the left

midabdomen are nonspecific but may be secondary to enteritis.

Clinical follow up is recommended.

2. There is no acute abnormality in the abdomen or pelvis noted

otherwise. In particular, there is no sign of appendicitis.

3. The gallbladder and uterus are surgically absent. 



Dictated by: 



  Dictated on workstation # [id number]

## 2018-09-02 NOTE — XMS REPORT
[facility]

 Created on: 2016



Sofiya Singh

External Reference #: [id number]

: 1955

Sex: Female



Demographics







 Address  [address] [place]  [address]

 

 Home Phone  [phone]

 

 Preferred Language  Unknown

 

 Marital Status  Unknown

 

 [employer] Affiliation  Unknown

 

 Race  White

 

 Ethnic Group  Not  or 





Author







 Author  NAHUN SNYDER

 

 Organization  [employer]

 

 Address  Unknown

 

 Phone  Unavailable







Care Team Providers







 Care Team Member Name  Role  Phone

 

 NAHUN SNYDER  CP  Unavailable



                                                                



Allergies

          No Known Allergies                                                   
                                     



Problems

          





 Problem Type  Condition  Code  Onset Dates  Condition Status

 

 Problem  Screening examination for pulmonary tuberculosis  V74.1     Active

 

 Problem  Pain in joint, pelvic region and thigh  719.45     Active

 

 Problem  Pneumonia, organism unspecified  486     Active

 

 Problem  Family history of ischemic heart disease  V17.3     Active

 

 Problem  Family history of other cardiovascular diseases  V17.49     Active

 

 Problem  Unspecified arthropathy, site unspecified  716.90     Active

 

 Problem  Anxiety state, unspecified  300.00     Active

 

 Problem  Lumbago  724.2     Active

 

 Problem  Family history of malignant neoplasm of breast  V16.3     Active

 

 Problem  Family history of diabetes mellitus  V18.0     Active



                                                                               
                                                                               
                    



Medications

          No Known Medications                                                 
                             



Results

          No Known Results                                                     
               



Summary Purpose

          [employer] Submission

## 2018-09-02 NOTE — XMS REPORT
[facility]

 Created on: 2018



Sofiya Singh

External Reference #: [id number]

: 1955

Sex: Female



Demographics







 Address  [address] [place]  [address]

 

 Preferred Language  Unknown

 

 Marital Status  Unknown

 

 Temple Affiliation  Unknown

 

 Race  Unknown

 

 Ethnic Group  Unknown





Author







 Author  NAHUN SNYDER

 

 Organization  [facility]

 

 Address  [address] [place]  [address]



 

 Phone  [phone]







Care Team Providers







 Care Team Member Name  Role  Phone

 

 NAHUN SNYDER  Unavailable  [phone]







PROBLEMS







 Type  Condition  ICD9-CM Code  DAL85-YF Code  Onset Dates  Condition Status  
SNOMED Code

 

 Problem  ADHD (attention deficit hyperactivity disorder), inattentive type     
F90.0     Active  27470285

 

 Problem  Arthritis     M19.90     Active  9071469

 

 Problem  Positive skin test for tuberculosis     R76.11     Active  867431424

 

 Problem  Lumbago with sciatica, left side     M54.42     Active  184466304

 

 Problem  Excessive daytime sleepiness     G47.19     Active  008369528111

 

 Problem  Primary narcolepsy without cataplexy     G47.419     Active  
[id number]

 

 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 
    Active  [id number]







ALLERGIES

No Information



ENCOUNTERS







 Encounter  Location  Date  Diagnosis

 

 [place] [id number] [place] [address] 93753-
8543  04 May, 2018   

 

 [place] [id number] [place] [address] 41300-
6228  14 Mar, 2018  ADHD (attention deficit hyperactivity disorder), 
inattentive type F90.0 ; Lumbago with sciatica, left side M54.42 and Arthritis 
M19.90

 

 [facility]  [id number] [place] [address] 75567-
3420    ADHD (attention deficit hyperactivity disorder), 
inattentive type F90.0 and Lumbar neuritis M54.16

 

 [place] [address] [place] [address] 36489-
9238     

 

 [place] [address] [place] [address] 75212-
1213    Lumbar neuritis M54.16

 

 [place] [address] [place] [address] 01259-
1601  16 2018  Low back pain M54.5

 

 [facility]  [id number] [place] [address] 41793-
9949    ADHD (attention deficit hyperactivity disorder), 
inattentive type F90.0

 

 [facility]  [id number] [place] [address] 02965-
3250    Positive skin test for tuberculosis R76.11

 

 [facility]  [address] [place] [address] 02643-
0305    Positive skin test for tuberculosis R76.11

 

 [facility]  [id number] [place] [address] 24302-
6671     

 

 [facility]  [address] [place] [address] 35367-
4667    Lumbar neuritis M54.16

 

 [facility]  [address] [place] [address] 88725-
9877    ADHD (attention deficit hyperactivity disorder), 
inattentive type F90.0

 

 [facility]  [id number] [place] [address] 38152-
5898     

 

 [facility]  [address] [place] [address] 36640-
7291  20 Dec, 2017  Lumbar neuritis M54.16

 

 [facility]  [address] [place] [address] 47127-
2704  15 Dec, 2017  ADHD (attention deficit hyperactivity disorder), 
inattentive type F90.0

 

 [facility]  [id number] [place] [address] 26025-
1993  12 Dec, 2017   

 

 [facility]  [address] [place] [address] 21588-
8500  11 Dec, 2017   

 

 [facility]  [address] [place] [address] 56215-
8411    Lumbar neuritis M54.16

 

 [facility]  [id number] [place] [address] 73619-
7191    ADHD (attention deficit hyperactivity disorder), 
inattentive type F90.0 and Primary narcolepsy without cataplexy G47.419

 

 [facility]  [address] [place] [address] 35291-
2737  10 Nov, 2017   

 

 [facility]  [address] [place] [address] 07623-
0364     

 

 [facility]  [address] [place] [address] 03681-
3940  23 Oct, 2017  Lumbar neuritis M54.16 and ADHD (attention deficit 
hyperactivity disorder), inattentive type F90.0

 

 [facility]  [address] [place] [address] 57083-
8946  12 Oct, 2017   

 

 [facility]  [address] [place] [address] 31001-
9124  26 Sep, 2017  Lumbar neuritis M54.16 and ADHD (attention deficit 
hyperactivity disorder), inattentive type F90.0

 

 [facility]  [address] [place] [address] 77491-
2525  19 Sep, 2017   

 

 [facility]  [address] [place] [address] 73550-
2870  31 Aug, 2017  ADHD (attention deficit hyperactivity disorder), 
inattentive type F90.0 and Lumbar neuritis M54.16

 

 [facility]  [address] [place] [address] 57857-
0669  24 Aug, 2017  Lumbar neuritis M54.16

 

 [facility]  [id number] [place] [address] 16436-
4845  23 Aug, 2017   

 

 [facility]  [id number] [place] [address] 87999-
1572  02 Aug, 2017  ADHD (attention deficit hyperactivity disorder), 
inattentive type F90.0 and Lumbar neuritis M54.16

 

 [facility]  [address] [address]-
1945  02 Aug, 2017   

 

 [facility]  [address] [address]-
1515     

 

 [facility]  [address] [address]1769-
9601     

 

 [facility]  [address] [address]-
3322     

 

 [facility]  [address] [address]-
3360     

 

 [facility]  [id number] [place] [address] 46311-
7739    ADHD (attention deficit hyperactivity disorder), 
inattentive type F90.0 and Lumbar neuritis M54.16

 

 [facility]  [address] [address]-
6368     

 

 [facility]  [address] [address]-
9490     

 

 [facility]  [address] [place] [address] 65729-
3322    Lumbar neuritis M54.16 and ADHD (attention deficit 
hyperactivity disorder), inattentive type F90.0

 

 [facility]  [address] [address]-
4394     

 

 [facility]  [address] [address]-
9312  10 May, 2017  Lumbar neuritis M54.16 and ADHD (attention deficit 
hyperactivity disorder), inattentive type F90.0

 

 [facility]  [address] 140447-
0600  03 May, 2017   

 

 [facility]  [address] 287219-
1515  01 May, 2017   

 

 [facility]  [address] [address]-
9581    Narcolepsy due to underlying condition without cataplexy 
G47.429 and Lumbago with sciatica, left side M54.42

 

 [facility]  [address] [place] [address] 75971-
9635    Lumbar neuritis M54.16 and ADHD (attention deficit 
hyperactivity disorder), inattentive type F90.0

 

 [facility]  [address] [place] [address] 51606-
2725  31 Mar, 2017   

 

 [facility]  [address] [place] [address] 55080-
4066  15 Mar, 2017  Lumbar neuritis M54.16 and ADHD (attention deficit 
hyperactivity disorder), inattentive type F90.0

 

 [facility]  [address] [place] [address] 93886-
3295  15 Mar, 2017   

 

 [facility]  [address] [place] [address] 58624-
6318  06 Mar, 2017   

 

 [facility]  [address] [place] [address] 38342-
8243  15 Feb, 2017  ADHD (attention deficit hyperactivity disorder), 
inattentive type F90.0

 

 [facility]  [id number] [place] [address] 67711-
0801  15 Feb, 2017  Lumbar neuritis M54.16

 

 [facility]  [address] [place] [address] 86947-
3772  08 2017   

 

 [facility]  [address] [place] [address] 04425-
5952  07 2017   

 

 [facility]  [address] [place] [address] 60058-
3201    Attention deficit hyperactivity disorder (ADHD), 
predominantly inattentive type F90.0

 

 [facility]  [id number] [place] [address] 23517-
5109     

 

 [facility]  [address] [place] [address] 11628-
9634  10 Niall, 2017   

 

 [facility]  [address] [place] [address] 86199-
9713     

 

 [facility]  [address] [place] [address] 80138-
5682  22 Dec, 2016  Attention deficit hyperactivity disorder (ADHD), 
predominantly inattentive type F90.0

 

 [facility]  [address] [place] [address] 00091-
3891  12 Dec, 2016   

 

 [facility]  [address] [place] [address] 35602-
8758  07 Dec, 2016   

 

 [facility]  [address] [place] [address] 08558-
0967  05 Dec, 2016   

 

 [facility]  [address] [place] [address] 42791-
1956  05 Dec, 2016  Low TSH level R94.6

 

 [facility]  [address] [place] [address] 27567-
8794  05 Dec, 2016   

 

 [facility]  [address] [place] [address] 45119-
7568  02 Dec, 2016   

 

 [facility]  [address] [place] [address] 69682-
5062  10 Nov, 2016   

 

 [facility]  [address] [place] [address] 27967-
4644  10 Nov, 2016   

 

 [facility]  [address] [place] [address] 43347-
6991     

 

 [facility]  [address] [place] [address] 32650-
4710  18 Oct, 2016  Low TSH level R94.6

 

 [facility]  [address] [place] [address] 84820-
8278  17 Oct, 2016  Excessive daytime sleepiness G47.19 and ADHD (attention 
deficit hyperactivity disorder), inattentive type F90.0

 

 [facility]  [address] [place] [address] 95945-
0574  13 Oct, 2016   

 

 [facility]  [address] [facility] [id number] [place] [address]-
8221  12 Oct, 2016   

 

 [facility] [place] [facility]  [address] [facility] [id number] [place] [address]-
4984  03 Oct, 2016   

 

 [facility]  [address] [facility] [id number] [place] [address]-
0221  28 Sep, 2016   

 

 [facility] [place] [facility]  [address] [facility] [id number] [place] [address], [place] [address]-
0717  14 Sep, 2016   

 

 [facility] [place] [facility]  [address] [facility] [id number] [place] [address]-
4117  01 Sep, 2016   

 

 [facility] [place] [facility]  [id number] [place] [address], [place] [address]-
6359  17 Aug, 2016   

 

 [facility] [place] [facility]  [id number] [place] [address], [place] [address]-
5433  04 Aug, 2016   

 

 [facility] [place] [facility]  [address] [facility] [id number] [place] [address], [place] [address]-
4595  03 Aug, 2016   

 

 [facility]  [id number] [address]-
5989    Lumbar neuritis M54.16

 

 [facility]  [id number] [address]-
5199     

 

 [facility] [place] [facility]  [address] [address]-
3723     

 

 [facility] [place] [facility]  [address] [facility] [id number][place] [address]-
8984    Lumbar neuritis M54.16

 

 [facility] [place] [facility]  [address] [facility] [id number] [place]-
1517     

 

 [facility] [place] [facility]  [address] [facility] [id number] [place] [address]-
7650     

 

 [facility] [place] [facility]  [address] [address]-
4334  26 May, 2016  Lumbar neuritis M54.16

 

 [facility]  [address] [address]-
2454  25 May, 2016   

 

 [facility]  [id number] [place] [address] 42244-
8968  10 May, 2016   

 

 [facility]  [address] [place] [address] 05432-
4079    Lumbar neuritis M54.16

 

 [facility]  [address] [place] [address] 60134-
6943     

 

 [facility]  [address] [place] [address] 42294-
9981     

 

 [facility]  [address] [place] [address] 97104-
8604  23 Mar, 2016   

 

 [facility]  [address] [place] [address] 58983-
4125  14 Mar, 2016   

 

 [facility]  [address] [place] [address] 38132-
1027  14 Mar, 2016   

 

 [facility]  [address] [place] [address] 10435-
6773  11 Mar, 2016   

 

 [facility]  [id number] [address]-
0954  24 2016   

 

 [facility]  [address] [place] [address] 26168-
9737  22 2016  Inguinal hernia, right K40.90

 

 [facility]  [address] [address]-
3701  17 2016   

 

 [facility]  [address] [place] [address] 42640-
2693  15 2016  Low back pain M54.5 and Sciatica, unspecified side M54.30

 

 [facility]  [address] [address]-
2871     

 

 [facility]  [address] [id number] [place] [address]-
6525     

 

 [facility]  [address] [facility] [id number] [place] [address], [place] [address]-
1052  30 Dec, 2015   

 

 [facility] [place] [facility]  [address] [facility] [id number] [place] [address]-
3793  28 Dec, 2015   

 

 [facility]  [address] [facility] [id number] [place] [address], [place] [address]-
6678  02 Dec, 2015   

 

 [facility] [place] [facility]  [address] [facility] [id number] [place] [address]-
7109     

 

 [facility]  [id number] [place] [address], [place] [address]-
2850     

 

 [facility]  [id number] [place] [address], [place] [address]-
8824     

 

 [facility]  [id number] [place] [address], [place] [address]-
3891     

 

 [facility]  [id number] [place] [address] 33039-
5649  26 Oct, 2015   

 

 [facility]  [id number] [place] [address] 83910-
6341  22 Oct, 2015  Encounter for immunization Z23

 

 [facility]  [address] [address]-
4079  07 Oct, 2015   

 

 [facility]  [id number] [address]-
4424  05 Oct, 2015   

 

 [facility]  [id number] [address]-
8535  09 Sep, 2015   

 

 [facility]  [id number] [address]-
5452  08 Sep, 2015   

 

 [facility]  [id number], [place] [address]-
9874  19 Aug, 2015  Lumbago 724.2

 

 [facility]  [id number] [address]-
0124  12 Aug, 2015   

 

 [facility] [place] [facility]  [address] [id number] [place] [address]-
6043    Lumbago 724.2

 

 [facility] [place] [facility]  [address] [id number] [place] [address]-
3906     

 

 [facility] [place] [facility]  [address] [id number] [place] [address]-
9825     

 

 [facility] [place] [facility]  [address] [id number] [place] [address]-
6480     

 

 [facility] [place] [facility]  [address] [id number] [place] [address]-
3438     

 

 [facility] [place] [facility]  [address] [id number] [place] [address]-
6701     

 

 [facility] [place] [facility]  [address] [id number] [place] [address]-
3699  22 May, 2015   

 

 [facility] [place] [facility]  [address] [id number] [place] [address]-
8069     

 

 [facility] [place] [facility]  [address] [id number] [place] [address]-
0526     

 

 [facility] [place] [facility]  [address] [facility] [id number] [place] [address]-
5292  30 Mar, 2015   

 

 [facility] [place] [facility]  [address] [id number] [place] [address]-
2755  30 Mar, 2015   

 

 [facility] [place] [facility]  [address] [id number] [place] [address]-
8444  02 Mar, 2015   

 

 [facility] [place] [facility]  [address] [id number] [place] [address]-
0596  02 Mar, 2015   

 

 [facility] [place] [facility]  [address] [id number] [place] [address]-
0556     

 

 [facility] [place] [facility]  [address] [id number] [place] [address]-
8913     

 

 [facility] [place] [facility]  [address] [id number] [place] [address]-
6133     

 

 [facility] [place] [facility]  [address] [id number] [place] [address]-
9356     

 

 [facility] [place] [facility]  [address] [facility] [id number] [place] [address]-
4227     

 

 [facility] [place] [facility]  [address] [id number] [place] [address]-
0374     

 

 [facility] [place] [facility]  [address] [id number] [place] [address]-
4435  31 Dec, 2014   

 

 [facility] [place] [facility]  [address] [id number] [place] [address]-
7325  31 Dec, 2014   

 

 [facility] [place] [facility]  [address] [id number] [place] [address]-
6829  22 Dec, 2014   

 

 [facility] [place] [facility]  [address] [id number] [place] [address]-
8628  22 Dec, 2014   

 

 [facility] [place] [facility]  [address] [id number] [place] [address]-
8302  08 Dec, 2014   

 

 [facility] [place] [facility]  [address] [facility] [id number] [place] [address]-
9881  08 Dec, 2014   

 

 [facility] [place] [facility]  [address] [id number] [place] [address]-
4790  10 Nov, 2014   

 

 [facility] [place] [facility]  [address] [id number][place] [address]-
4616  10 Nov, 2014   

 

 [facility] [place] [facility]  [address] [id number] [place] [address]-
6163  13 Oct, 2014   

 

 [facility] [place] [facility]  [address] [id number] [place] [address]-
6360  13 Oct, 2014   

 

 [facility] [place] [facility]  [address] [facility] [id number] [place] [address]-
9884  01 Oct, 2014   

 

 [facility] [place] [facility]  [address] [id number] [place] [address]-
1744  01 Oct, 2014   

 

 [facility] [place] [facility]  [address] [id number] [place] [address]-
1522  15 Sep, 2014   

 

 [facility] [place] [facility]  [address] [id number] [place]-
9567  15 Sep, 2014   

 

 [facility] [place] [facility]  [address] [id number] [place] [address]-
6632  18 Aug, 2014   

 

 [facility] [place] [facility]  [address] [id number] [place] [address]-
0714  18 Aug, 2014   

 

 [facility] [place] [facility]  [address] [id number] [place] [address]-
4569  18 Aug, 2014   

 

 [facility] [place] [facility]  [address] [id number] [place] [address]-
7204  18 Aug, 2014   

 

 [facility] [place] [facility]  [address] [id number] [place] [address]-
9346     

 

 [facility] [place] [facility]  [address] [id number] [place] [address]-
0091     

 

 [facility] [place] [facility]  [address] [id number] [place] [address]-
9404     

 

 [facility] [place] [facility]  [address] [id number] [place] [address]-
0440     

 

 [facility] [place] [facility]  [address] [id number] [place] [address]-
3308     

 

 [facility] [place] [facility]  [address] [id number] [place] [address]-
2142     

 

 [facility] [place] [facility]  [address] [id number] [place] [address]-
9165  30 May, 2014   

 

 [facility] [place] [facility]  [address] [id number] [place] [address]-
5078  28 May, 2014   

 

 [facility] [place] [facility]  [address] [id number] [place] [address]-
4258  28 May, 2014   

 

 [facility] [place] [facility]  [address] [id number] [place] [address]-
9274     

 

 [facility] [place] [facility]  [address] [id number] [place] [address]-
4535     

 

 [facility] [place] [facility]  [address] [id number] [place] [address]-
2880     

 

 [facility] [place] [facility]  [address] [id number] [place] [address]-
8852     

 

 [facility] [place] [facility]  [address] [id number] [place] [address]-
7692     

 

 [facility] [place] [facility]  [address] [id number] [place] [address]-
0009     

 

 [facility] [place] [facility]  [address] [id number] [place] [address]-
4720     

 

 [facility] [place] [facility]  [address] [id number] [place] [address]-
7801     

 

 [facility] [place] [facility]  [address] [id number] [place] [address]-
6996     

 

 [facility] [place] [facility]  [address] [id number] [place] [address]-
[address]     

 

 [facility] [place] [facility]  [address] [id number] [place] [address]-
[address]     

 

 [facility] [place] [facility]  [address] [id number] [place] [address]-
[address]  28 Mar, 2014   

 

 [facility] [place] [facility]  [address] [id number] [place] [address]-
[address]  27 Mar, 2014   

 

 [facility] [place] [facility]  [address] [id number] [place] [address]-
[address]  27 Mar, 2014   

 

 [facility] [place] [facility]  [address] [id number] [place] [address]-
[address]  26 Mar, 2014   

 

 [facility] [place] [facility]  [address] [id number] [place] [address]-
[address]  26 Mar, 2014   

 

 [facility] [place] [facility]  [address] [id number] [place] [address]     

 

 [facility] [place] [facility]  [address] [id number] [place] [id number]-
[id number]     

 

 [facility] [place] [facility]  [address] [id number] [place] [address]-
5676     

 

 [facility] [place] [facility]  [address] [id number] [place] [address]-
1548     

 

 [facility] [place] [facility]  [address] [id number] [place] [address]-
9916     

 

 [facility] [place] [facility]  [address] [id number] [place] [address]-
7956     

 

 [facility] [place] [facility]  [address] [id number] [place] [address]-
2949     

 

 [facility] [place] [facility]  [address] [id number] [place] [address]-
2729     

 

 [facility] [place] [facility]  [address] [id number] [place] [address]-
6282     

 

 [facility] [place] [facility]  [address] [facility] [id number] [place] [address]-
9961     

 

 [facility] [place] [facility]  [address] [id number] [place] [address]-
8643     

 

 [facility] [place] [facility]  [address] [id number] [place] [address]-
7699     

 

 [facility] [place] [facility]  [address] [id number] [place] [address]-
3139     

 

 [facility] [place] [facility]  [address] [id number] [place] [address]-
5521  10 Dec, 2013   

 

 [facility] [place] [facility]  [address] [id number] [place] [address]-
8171  10 Dec, 2013   

 

 [facility]  [address] [facility] [id number] [place] [id number]
[id number]     

 

 [facility]  [address] [facility] [id number] [address]-
7922     







IMMUNIZATIONS

No Known Immunizations



SOCIAL HISTORY

Never Assessed



REASON FOR VISIT

Controlled Refill Requests Due  



PLAN OF CARE





VITAL SIGNS





MEDICATIONS







 Medication  Instructions  Dosage  Frequency  Start Date  End Date  Duration  
Status

 

 Alprazolam 1 MG  Orally Three times a day  1 tablet  8h  30 Mar, 2015     28 
days  Active

 

 Hydrocodone-Acetaminophen  MG  Orally every 6 hrs  1 tablet as needed  
6h       28 days  Active







RESULTS

No Results



PROCEDURES

No Known procedures



INSTRUCTIONS





MEDICATIONS ADMINISTERED

No Known Medications



MEDICAL (GENERAL) HISTORY







 Type  Description  Date

 

 Medical History  narcolepsy   

 

 Surgical History  Gallbladder removed  2015

 

 Surgical History  Hernia repair  2016

## 2018-09-02 NOTE — XMS REPORT
[facility]

 Created on: 2018



Sofiya Singh

External Reference #: [id number]

: 1955

Sex: Female



Demographics







 Address  [address] [place]  [address]

 

 Preferred Language  Unknown

 

 Marital Status  Unknown

 

 Holiness Affiliation  Unknown

 

 Race  Unknown

 

 Ethnic Group  Unknown





Author







 Author  NAHUN SNYDER

 

 Organization  [facility]

 

 Address  [address] [place]  [address]



 

 Phone  [phone]







Care Team Providers







 Care Team Member Name  Role  Phone

 

 NAHUN SNYDER  Unavailable  [phone]







PROBLEMS







 Type  Condition  ICD9-CM Code  IJH86-LH Code  Onset Dates  Condition Status  
SNOMED Code

 

 Problem  ADHD (attention deficit hyperactivity disorder), inattentive type     
F90.0     Active  87718364

 

 Problem  Arthritis     M19.90     Active  3505620

 

 Problem  Positive skin test for tuberculosis     R76.11     Active  702824249

 

 Problem  Lumbago with sciatica, left side     M54.42     Active  774772527

 

 Problem  Excessive daytime sleepiness     G47.19     Active  606278672446

 

 Problem  Primary narcolepsy without cataplexy     G47.419     Active  
[id number]

 

 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 
    Active  [id number]







ALLERGIES

No Information



ENCOUNTERS







 Encounter  Location  Date  Diagnosis

 

 [place] [address] [address]-
9296  14 Mar, 2018  ADHD (attention deficit hyperactivity disorder), 
inattentive type F90.0 ; Lumbago with sciatica, left side M54.42 and Arthritis 
M19.90

 

 [place] [address] [place] [id number] [place] [address] 10984-
4910    ADHD (attention deficit hyperactivity disorder), 
inattentive type F90.0 and Lumbar neuritis M54.16

 

 [place] [address] [place] [id number] [place] [address] 51716-
5526     

 

 [place] [address] [address]-
8608    Lumbar neuritis M54.16

 

 [place] [address] [place] [id number] [place] [address] 11366-
1783    Low back pain M54.5

 

 [facility]  [address] [place] [address] 52841-
3392    ADHD (attention deficit hyperactivity disorder), 
inattentive type F90.0

 

 [facility]  [id number] [place] [address] 83981-
6609    Positive skin test for tuberculosis R76.11

 

 [facility]  [id number] [place] [address] 73243-
2572    Positive skin test for tuberculosis R76.11

 

 [facility]  [id number] [place] [address] 69372-
9890     

 

 [facility]  [address] [id number] [place] [address] 77147-
6523    Lumbar neuritis M54.16

 

 [facility]  [address] [id number] [place] [address] 85272-
4312    ADHD (attention deficit hyperactivity disorder), 
inattentive type F90.0

 

 [facility]  [address] [id number] [place] [address] 09402-
4505     

 

 [facility]  [address] [id number] [place] [address] 57826-
8782  20 Dec, 2017  Lumbar neuritis M54.16

 

 [facility]  [address] [id number] [place] [address] 85790-
0475  15 Dec, 2017  ADHD (attention deficit hyperactivity disorder), 
inattentive type F90.0

 

 [facility]  [address] [place] [address] 74974-
2574  12 Dec, 2017   

 

 [facility]  [address] [id number] [place] [address] 43120-
7832  11 Dec, 2017   

 

 [facility]  [id number] [place] [address] 54291-
4711    Lumbar neuritis M54.16

 

 [facility]  [address] [place] [id number] [place] [address] 92793-
9568    ADHD (attention deficit hyperactivity disorder), 
inattentive type F90.0 and Primary narcolepsy without cataplexy G47.419

 

 [facility]  [address] [id number] [place] [address] 74867-
3419  10 Nov, 2017   

 

 [facility]  [id number] [place] [address] 84289-
1292     

 

 [facility]  [id number] [place] [address] 38744-
0779  23 Oct, 2017  Lumbar neuritis M54.16 and ADHD (attention deficit 
hyperactivity disorder), inattentive type F90.0

 

 [facility]  [address] [id number] [place] [address] 45051-
5723  12 Oct, 2017   

 

 [facility]  [address] [id number] [place] [address] 85358-
8232  26 Sep, 2017  Lumbar neuritis M54.16 and ADHD (attention deficit 
hyperactivity disorder), inattentive type F90.0

 

 [facility]  [address] [place] [id number] [place] [address] 49371-
2982  19 Sep, 2017   

 

 [facility]  [address] [place] [id number] [place] [address] 92563-
2525  31 Aug, 2017  ADHD (attention deficit hyperactivity disorder), 
inattentive type F90.0 and Lumbar neuritis M54.16

 

 [facility]  [address] [place] [id number] [place] [address] 05976-
6463  24 Aug, 2017  Lumbar neuritis M54.16

 

 [facility]  [address] [place] [id number] [place] [address] 00094-
5052  23 Aug, 2017   

 

 [facility]  [address] [id number] [place] [address] 77299-
4699  02 Aug, 2017  ADHD (attention deficit hyperactivity disorder), 
inattentive type F90.0 and Lumbar neuritis M54.16

 

 [facility]  [address] [place] [id number] [place] [address] 76225-
2549  02 Aug, 2017   

 

 [facility]  [address] [place] [address] [address]-
6461     

 

 [facility]  [address] [address]-
8796     

 

 [facility]  [address] [address]-
2289     

 

 [facility]  [address] [place] [id number] [place] [address] 76874-
7871     

 

 [facility]  [address] [place] [id number] [place] [address] 41646-
4333    ADHD (attention deficit hyperactivity disorder), 
inattentive type F90.0 and Lumbar neuritis M54.16

 

 [facility]  [address] [address]-
4653     

 

 [facility]  [address] [place] [id number] [place] [address] 93780-
1777     

 

 [facility]  [address] [place] [address] 22054-
3706    Lumbar neuritis M54.16 and ADHD (attention deficit 
hyperactivity disorder), inattentive type F90.0

 

 [facility]  [address] [address]-
9344     

 

 [facility]  [address] [address]-
3276  10 May, 2017  Lumbar neuritis M54.16 and ADHD (attention deficit 
hyperactivity disorder), inattentive type F90.0

 

 [facility]  [address] [address]-
5234  03 May, 2017   

 

 [facility]  [address] [address]-
9948  01 May, 2017   

 

 [facility]  [address] [address]-
0039    Narcolepsy due to underlying condition without cataplexy 
G47.429 and Lumbago with sciatica, left side M54.42

 

 [facility] [place] [facility]  [address], [place] [address]-
9790  14 2017  Lumbar neuritis M54.16 and ADHD (attention deficit 
hyperactivity disorder), inattentive type F90.0

 

 [facility]  [address] [place] [id number] [address]-
5606  31 Mar, 2017   

 

 [facility]  [address] [place] [id number] [place] [address] 60662-
0169  15 Mar, 2017  Lumbar neuritis M54.16 and ADHD (attention deficit 
hyperactivity disorder), inattentive type F90.0

 

 [facility]  [address] [id number] [place] [address] 14545-
2651  15 Mar, 2017   

 

 [facility]  [address] [id number] [place] [address] 88979-
5986  06 Mar, 2017   

 

 [facility]  [address] [id number] [place] [address] 34844-
2215  15 2017  ADHD (attention deficit hyperactivity disorder), 
inattentive type F90.0

 

 [facility]  [address] [place] [address] 98047-
9474  15 2017  Lumbar neuritis M54.16

 

 [facility]  [address] [id number] [address]-
1580  08 2017   

 

 [facility]  [address] [id number] [place] [address] 79877-
0207     

 

 [facility]  [address] [place] [id number] [place] [address] 01810-
5019    Attention deficit hyperactivity disorder (ADHD), 
predominantly inattentive type F90.0

 

 [facility]  [address] [place] [id number] [place] [address] 32119-
9549     

 

 [facility]  [address] [address]-
5234  10 Niall, 2017   

 

 [facility]  [address] [place] [id number] [place] [address] 62032-
3101     

 

 [facility]  [address] [place] [address] 42722-
0001  22 Dec, 2016  Attention deficit hyperactivity disorder (ADHD), 
predominantly inattentive type F90.0

 

 [facility]  [address] [id number] [place] [address] 02932-
1442  12 Dec, 2016   

 

 [facility]  [address] [place] [id number] [place] [address] 61730-
3536  07 Dec, 2016   

 

 [facility]  [id number] [place] [address] 74150-
0326  05 Dec, 2016   

 

 [facility]  [address] [id number] [place] [address] 20024-
3236  05 Dec, 2016   

 

 [facility]  [address] [id number] [place] [address] 67591-
3671  05 Dec, 2016  Low TSH level R94.6

 

 [facility]  [id number] [place] [address] 41082-
0929  02 Dec, 2016   

 

 [facility]  [address] [place] [id number] [place] [address] 23911-
7073  10 Nov, 2016   

 

 [facility]  [address] [id number] [place] [address] 86719-
7056  10 Nov, 2016   

 

 [facility]  [address] [id number] [place] [address] 21216-
9817     

 

 [facility]  [id number] [place] [address] 43297-
9322  18 Oct, 2016  Low TSH level R94.6

 

 [facility]  [address] [place] [address] 82500-
5839  17 Oct, 2016  Excessive daytime sleepiness G47.19 and ADHD (attention 
deficit hyperactivity disorder), inattentive type F90.0

 

 [facility]  [address] [place] [address] 65056-
8204  13 Oct, 2016   

 

 [facility]  [address] [place] [id number] [place] [address] 62182-
1776  12 Oct, 2016   

 

 [facility]  [address] [facility] [id number] [place] [address]-
9246  03 Oct, 2016   

 

 [facility]  [address] [facility] [id number] [place] [address], [place] [address]-
0989  28 Sep, 2016   

 

 [facility]  [address] [facility] [id number] [place] [address]-
7540  14 Sep, 2016   

 

 [facility]  [address] [facility] [id number] [place] [address], [place] [address]-
5710  01 Sep, 2016   

 

 [facility]  [address] [facility] [id number] [place] [address], [place] [address]-
0617  17 Aug, 2016   

 

 [facility]  [address] [facility] [id number] [place] [address], [place] [address]-
0949  04 Aug, 2016   

 

 [facility]  [address] [facility] [id number] [place] [address], [place] [address]-
2414  03 Aug, 2016   

 

 [facility]  [address] [place] [id number] [place] [address] 05876-
2935    Lumbar neuritis M54.16

 

 [facility]  [address] [place] [id number] [address]-
9841     

 

 [facility]  [address] [place] [id number] [address]-
7894     

 

 [facility]  [address], [place] [address]-
8827    Lumbar neuritis M54.16

 

 [facility]  [address] [facility] [id number] [address]-
3905     

 

 [facility]  [address] [place] [id number] [address]-
2579     

 

 [facility] [place] [facility]  [address] [facility] [id number][place] [address]-
4666  26 May, 2016  Lumbar neuritis M54.16

 

 [facility]  [address] [place] [id number] [address]-
8948  25 May, 2016   

 

 [facility]  [address] [place] [address] 40413-
6440  10 May, 2016   

 

 [facility]  [address] [place] [id number] [place] [address] 47233-
5190    Lumbar neuritis M54.16

 

 [facility]  [address] [place] [id number] [place] [address] 36192-
9098     

 

 [facility]  [address] [place] [id number] [place] [address] 39806-
3370     

 

 [facility]  [id number] [place] [address] 91142-
2367  23 Mar, 2016   

 

 [facility]  [address] [place] [id number] [place] [address] 59763-
3642  14 Mar, 2016   

 

 [facility]  [address] [place] [id number] [place] [address] 17662-
5594  14 Mar, 2016   

 

 [facility]  [address] [place] [id number] [place] [address] 44548-
6105  11 Mar, 2016   

 

 [facility]  [address] [place] [id number] [place] [address] 80359-
3787  24 2016   

 

 [facility]  [address] [place] [id number] [address]-
1896    Inguinal hernia, right K40.90

 

 [facility]  [address] [place] [id number] [place] [address] 21906-
3218     

 

 [facility]  [address] [place] [address] 20512-
1593  15 2016  Low back pain M54.5 and Sciatica, unspecified side M54.30

 

 [facility]  [address] [place] [id number] [address]-
8506     

 

 [facility]  [id number] [place] [address] 49915-
6191     

 

 [facility]  [address] [facility] [id number] [place] [address]-
2062  30 Dec, 2015   

 

 [facility]  [address] [place] [id number] [place] [address], [place] [address]-
8646  28 Dec, 2015   

 

 [facility] [place] [facility]  [address] [facility] [id number] [place] [address]-
8768  02 Dec, 2015   

 

 [facility] [place] [facility]  [address] [facility] [id number] [place] [address], [place] [address]-
1165     

 

 [facility] [place] [facility]  [address] [facility] [id number] [place] [address]-
8150     

 

 [facility] [place] [facility]  [id number] [place] [address], [place] [address]-
0568     

 

 [facility] [place] [facility]  [address] [place] [id number] [place] [address], [place] [address]-
6016     

 

 [facility] [place] [facility]  [address] [id number] [place] [address], [place] [address]-
4041  26 Oct, 2015   

 

 [facility]  [address] [place] [id number] [place] [address] 25052-
4496  22 Oct, 2015  Encounter for immunization Z23

 

 [facility]  [address] [place] [address] 24463-
1116  07 Oct, 2015   

 

 [facility]  [address] [address]-
8575  05 Oct, 2015   

 

 [facility] [place] [facility]  [address], [place] [address]-
4373  09 Sep, 2015   

 

 [facility] [place] [facility]  [address] [place] [id number] [address]-
7342  08 Sep, 2015   

 

 [facility] [place] [facility]  [address] [id number] [address]-
2635  19 Aug, 2015  Lumbago 724.2

 

 [facility] [place] [facility]  [address] [place] [address]-
5050  12 Aug, 2015   

 

 [facility] [place] [facility]  [address] [id number] [place] [address]-
3067    Lumbago 724.2

 

 [facility] [place] [facility]  [address] [id number] [place] [address]-
8815  17 2015   

 

 [facility] [place] [facility]  [address] [id number] [place] [address]-
8318     

 

 [facility] [place] [facility]  [address] [id number] [place] [address]-
7906     

 

 [facility] [place] [facility]  [address] [id number] [place] [address]-
5688     

 

 [facility] [place] [facility]  [address] [id number] [place] [address]-
1911     

 

 [facility] [place] [facility]  [address] [facility] [id number] [place] [address]-
5329  22 May, 2015   

 

 [facility] [place] [facility]  [address] [facility] [id number] [place] [address]-
7160     

 

 [facility] [place] [facility]  [address] [facility] [id number] [place] [address]-
1790     

 

 [facility] [place] [facility]  [address] [facility] [id number] [place] [address]-
0213  30 Mar, 2015   

 

 [facility] [place] [facility]  [address] [facility] [id number] [place] [address]-
1421  30 Mar, 2015   

 

 [facility] [place] [facility]  [address] [facility] [id number] [place] [address]-
2546  02 Mar, 2015   

 

 [facility] [place] [facility]  [address] [facility] [id number] [place] [address]-
5286  02 Mar, 2015   

 

 [facility] [place] [facility]  [address] [facility] [id number] [place] [address]-
8546     

 

 [facility] [place] [facility]  [address] [facility] [id number] [place] [address]-
2546     

 

 [facility] [place] [facility]  [address] [facility] [id number] [place] [address]-
4708     

 

 [facility] [place] [facility]  [address] [id number] [place] [address]-
8160     

 

 [facility] [place] [facility]  [address] [id number] [place] [address]-
6499     

 

 [facility] [place] [facility]  [address] [facility] [id number] [place] [address]-
4843     

 

 [facility] [place] [facility]  [address] [id number] [place] [address]-
3293  31 Dec, 2014   

 

 [facility] [place] [facility]  [address] [id number] [place]-
3444  31 Dec, 2014   

 

 [facility] [place] [facility]  [address] [facility] [id number] [place] [address]-
6943  22 Dec, 2014   

 

 [facility] [place] [facility]  [address] [facility] [id number] [place] [address]-
4009  22 Dec, 2014   

 

 [facility] [place] [facility]  [address] [id number] [place] [address]-
2670  08 Dec, 2014   

 

 [facility] [place] [facility]  [address] [facility] [id number] [place] [address]-
9760  08 Dec, 2014   

 

 [facility] [place] [facility]  [address] [facility] [id number] [place] [address]-
0980  10 Nov, 2014   

 

 [facility] [place] [facility]  [address] [facility] [id number] [address]-
5869  10 Nov, 2014   

 

 [facility] [place] [facility]  [address] [id number][place] [address]-
1284  13 Oct, 2014   

 

 [facility] [place] [facility]  [address] [id number] [place] [address]-
0004  13 Oct, 2014   

 

 [facility] [place] [facility]  [address] [facility] [id number][place] [address]-
2923  01 Oct, 2014   

 

 [facility] [place] [facility]  [address] [facility] [id number][place] [address]-
2147  01 Oct, 2014   

 

 [facility] [place] [facility]  [address] [id number] [place] [address]-
7460  15 Sep, 2014   

 

 [facility]  [address] [id number] [place] [address]-
9497  15 Sep, 2014   

 

 [facility] [place] [facility]  [address] [id number] [place] [address]-
4712  18 Aug, 2014   

 

 [facility] [place] [facility]  [address] [id number] [place] [address]-
4203  18 Aug, 2014   

 

 [facility] [place] [facility]  [address] [id number] [place] [address]-
1058  18 Aug, 2014   

 

 [facility] [place] [facility]  [address] [id number] [place] [address]-
9190  18 Aug, 2014   

 

 [facility] [place] [facility]  [address] [id number] [place] [address]-
0725     

 

 [facility] [place] [facility]  [address] [id number] [place] [address]-
6588     

 

 [facility]  [address] [id number] [place] [address]-
8402     

 

 [facility] [place] [facility]  [address] [id number] [place] [address]-
0569     

 

 [facility]  [address] [id number] [place] [address]-
1702     

 

 [facility] [place] [facility]  [address] [id number] [place] [address]-
1054     

 

 [facility] [place] [facility]  [address] [id number] [place] [address]-
9329  30 May, 2014   

 

 [facility] [place] [facility]  [address] [id number] [place] [address]-
6580  28 May, 2014   

 

 [facility] [place] [facility]  [address] [id number] [place] [address]-
2795  28 May, 2014   

 

 [facility] [place] [facility]  [address] [id number] [place] [address]-
0015     

 

 [facility] [place] [facility]  [address] [id number] [place] [address]-
6983  30 2014   

 

 [facility] [place] [facility]  [address] [id number] [place] [address]-
0675     

 

 [facility] [place] [facility]  [address] [id number] [place] [address]-
3692     

 

 [facility] [place] [facility]  [address] [id number] [place] [address]-
2007     

 

 [facility] [place] [facility]  [address] [id number] [place] [address]-
2233     

 

 [facility] [place] [facility]  [address] [id number] [place] [address]-
5848     

 

 [facility] [place] [facility]  [address] [id number] [place] [address]-
9824     

 

 [facility] [place] [facility]  [address] [id number] [place] [address]-
7990     

 

 [facility] [place] [facility]  [address] [id number] [place] [address]-
4779     

 

 [facility] [place] [facility]  [address] [id number] [place] [address]-
6168     

 

 [facility] [place] [facility]  [address] [id number] [place] [address]-
4268  28 Mar, 2014   

 

 [facility] [place] [facility]  [address] [id number] [place] [address]-
[address]94  27 Mar, 2014   

 

 [facility] [place] [facility]  [address] [id number] [place] [address]-
5946  27 Mar, 2014   

 

 [facility] [place] [facility]  [address] [id number] [place] [address]-
8461  26 Mar, 2014   

 

 [facility] [place] [facility]  [address] [id number] [place] [address]-
9036  26 Mar, 2014   

 

 [facility] [place] [facility]  [address] [id number] [place] [address]-
[address]     

 

 [facility] [place] [facility]  [address] [id number] [place] [address]-
3256     

 

 [facility] [place] [facility]  [address] [id number] [place], [place] [id number]-
[address]     

 

 [facility] [place] [facility]  [address] [id number] [place] [address]-
1756     

 

 [facility] [place] [facility]  [address] [id number] [place] [address]-
3246     

 

 [facility] [place] [facility]  [address] [id number] [place] [address]-
9286     

 

 [facility] [place] [facility]  [address] [id number] [place] [address]-
6404     

 

 [facility] [place] [facility]  [address] [id number] [place] [address]-
1396     

 

 [facility] [place] [facility]  [address] [id number] [place] [address]-
4717     

 

 [facility] [place] [facility]  [address] [id number] [place] [address]-
1892     

 

 [facility] [place] [facility]  [address] [id number] [place] [address]-
5677     

 

 [facility] [place] [facility]  [address] [id number] [place] [address]-
2076     

 

 [facility] [place] [facility]  [address] [id number] [place] [address]-
6159     

 

 [facility] [place] [facility]  [address] [id number] [place] [address]-
3877  10 Dec, 2013   

 

 [facility] [place] [facility]  [address] [id number] [place] [address]-
2878  10 Dec, 2013   

 

 [facility] [place] [facility]  [address] [id number] [place] [address]-
3669     

 

 [facility]  [address] [facility] [id number] [place] [address]-
7746     







IMMUNIZATIONS

No Known Immunizations



SOCIAL HISTORY

Never Assessed



REASON FOR VISIT

Controlled Med Refill 17



PLAN OF CARE





VITAL SIGNS





MEDICATIONS







 Medication  Instructions  Dosage  Frequency  Start Date  End Date  Duration  
Status

 

 Xanax 1 MG  Orally 3 times a day  1 tablet  8h  24 Aug, 2017        Active

 

 Norco  MG  Orally every 6 hrs  1 tablet as needed  6h  12 Dec, 2017     
   Active







RESULTS

No Results



PROCEDURES

No Known procedures



INSTRUCTIONS





MEDICATIONS ADMINISTERED

No Known Medications



MEDICAL (GENERAL) HISTORY







 Type  Description  Date

 

 Medical History  narcolepsy   

 

 Surgical History  Gallbladder removed  2015

 

 Surgical History  Hernia repair  2016

## 2018-09-02 NOTE — XMS REPORT
[facility]

 Created on: 2018



Sofiya Singh

External Reference #: [id number]

: 1955

Sex: Female



Demographics







 Address  [address] [place]  [address]

 

 Preferred Language  Unknown

 

 Marital Status  Unknown

 

 Baptism Affiliation  Unknown

 

 Race  Unknown

 

 Ethnic Group  Unknown





Author







 Author  NAHUN SNYDER

 

 Organization  [facility]

 

 Address  [address] [place]  [address]



 

 Phone  [phone]







Care Team Providers







 Care Team Member Name  Role  Phone

 

 NAHUN SNYDER  Unavailable  [phone]







PROBLEMS







 Type  Condition  ICD9-CM Code  XDT56-IT Code  Onset Dates  Condition Status  
SNOMED Code

 

 Problem  ADHD (attention deficit hyperactivity disorder), inattentive type     
F90.0     Active  47300383

 

 Problem  Arthritis     M19.90     Active  3869095

 

 Problem  Positive skin test for tuberculosis     R76.11     Active  800723989

 

 Problem  Lumbago with sciatica, left side     M54.42     Active  973845462

 

 Problem  Excessive daytime sleepiness     G47.19     Active  017301298576

 

 Problem  Primary narcolepsy without cataplexy     G47.419     Active  
[id number]

 

 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 
    Active  [id number]







ALLERGIES

No Information



ENCOUNTERS







 Encounter  Location  Date  Diagnosis

 

 [place] [id number] [place] [address] 75256-
1806     

 

 [place] [id number] [place] [address] 68741-
2516  06 2018  Right lower quadrant abdominal pain R10.31 and Rash R21

 

 [place] [address] [address]-
3504  14 Mar, 2018  ADHD (attention deficit hyperactivity disorder), 
inattentive type F90.0 ; Lumbago with sciatica, left side M54.42 and Arthritis 
M19.90

 

 [place] [address] [address]-
4989    ADHD (attention deficit hyperactivity disorder), 
inattentive type F90.0 and Lumbar neuritis M54.16

 

 [place] [id number] [place] [address] 99596-
3897     

 

 [facility]  [address] [place] [address] 55462-
1115    Lumbar neuritis M54.16

 

 [facility]  [id number] [place] [address] 11410-
9849  16 2018  Low back pain M54.5

 

 [facility]  [id number] [place] [address] 96620-
4410    ADHD (attention deficit hyperactivity disorder), 
inattentive type F90.0

 

 [facility]  [id number] [place] [address] 40262-
6892    Positive skin test for tuberculosis R76.11

 

 [facility]  [id number] [place] [address] 40771-
0314    Positive skin test for tuberculosis R76.11

 

 [facility]  [id number] [place] [address] 84468-
8004     

 

 [facility]  [id number] [place] [address] 72711-
5747    Lumbar neuritis M54.16

 

 [facility]  [id number] [place] [address] 62033-
8510    ADHD (attention deficit hyperactivity disorder), 
inattentive type F90.0

 

 [facility]  [id number] [place] [address] 86165-
3310     

 

 [facility]  [id number] [place] [address] 38569-
4931  20 Dec, 2017  Lumbar neuritis M54.16

 

 [facility]  [id number] [place] [address] 53671-
6845  15 Dec, 2017  ADHD (attention deficit hyperactivity disorder), 
inattentive type F90.0

 

 [facility]  [id number] [place] [address] 97238-
4285  12 Dec, 2017   

 

 [facility]  [id number] [place] [address] 18070-
1745  11 Dec, 2017   

 

 [facility]  [address] [place] [address] 55368-
3197    Lumbar neuritis M54.16

 

 [facility]  [id number] [place] [address] 73153-
3992    ADHD (attention deficit hyperactivity disorder), 
inattentive type F90.0 and Primary narcolepsy without cataplexy G47.419

 

 [facility]  [id number] [place] [address] 11196-
4037  10 Nov, 2017   

 

 [facility]  [id number] [place] [address] 39569-
1607     

 

 [facility]  [id number] [place] [address] 84618-
4946  23 Oct, 2017  Lumbar neuritis M54.16 and ADHD (attention deficit 
hyperactivity disorder), inattentive type F90.0

 

 [facility]  [id number] [place] [address] 67304-
4369  12 Oct, 2017   

 

 [facility]  [id number] [place] [address] 90842-
3126  26 Sep, 2017  Lumbar neuritis M54.16 and ADHD (attention deficit 
hyperactivity disorder), inattentive type F90.0

 

 [facility]  [id number] [place] [address] 43600-
2914  19 Sep, 2017   

 

 [facility]  [id number] [place] [address] 09484-
6867  31 Aug, 2017  ADHD (attention deficit hyperactivity disorder), 
inattentive type F90.0 and Lumbar neuritis M54.16

 

 [facility]  [id number] [place] [address] 80198-
1109  24 Aug, 2017  Lumbar neuritis M54.16

 

 [facility]  [id number] [place] [address] 91711-
6155  23 Aug, 2017   

 

 [facility]  [id number] [place] [address] 26782-
0554  02 Aug, 2017  ADHD (attention deficit hyperactivity disorder), 
inattentive type F90.0 and Lumbar neuritis M54.16

 

 [facility]  [address] [address]-
6894  02 Aug, 2017   

 

 [facility]  [id number] [address]-
7209     

 

 [facility]  [id number] [place] [address] 82483-
4594     

 

 [facility]  [id number] [place] [address] 01351-
5992     

 

 [facility]  [id number] [place] [address] 23674-
0324     

 

 [facility]  [id number] [place] [address] 48300-
6170    ADHD (attention deficit hyperactivity disorder), 
inattentive type F90.0 and Lumbar neuritis M54.16

 

 [facility]  [address] [address]-
7257     

 

 [facility]  [id number] [place] [address] 55466-
6082     

 

 [facility]  [address] [place] [address] 80421-
1164    Lumbar neuritis M54.16 and ADHD (attention deficit 
hyperactivity disorder), inattentive type F90.0

 

 [facility]  [address] [address]-
0709     

 

 [facility]  [id number] [place] [address] 22213-
6189  10 May, 2017  Lumbar neuritis M54.16 and ADHD (attention deficit 
hyperactivity disorder), inattentive type F90.0

 

 [facility]  [address] [address]-
2490  03 May, 2017   

 

 [facility]  [address] [place] [address] 47212-
3370  01 May, 2017   

 

 [facility]  [address] [place] [address] 85495-
6377    Narcolepsy due to underlying condition without cataplexy 
G47.429 and Lumbago with sciatica, left side M54.42

 

 [facility]  [address] [place] [address] 32689-
3580    Lumbar neuritis M54.16 and ADHD (attention deficit 
hyperactivity disorder), inattentive type F90.0

 

 [facility]  [address] [place] [address] 99497-
1187  31 Mar, 2017   

 

 [facility]  [address] [place] [address] 32710-
7887  15 Mar, 2017  Lumbar neuritis M54.16 and ADHD (attention deficit 
hyperactivity disorder), inattentive type F90.0

 

 [facility]  [address] [place] [address] 43731-
6400  15 Mar, 2017   

 

 [facility]  [address] [place] [address] 86841-
5407  06 Mar, 2017   

 

 [facility]  [address] [place] [address] 72730-
1847  15 Feb, 2017  ADHD (attention deficit hyperactivity disorder), 
inattentive type F90.0

 

 [facility]  [id number] [place] [address] 15684-
5450  15 Feb, 2017  Lumbar neuritis M54.16

 

 [facility]  [address] [place] [address] 10034-
7747     

 

 [facility]  [address] [place] [address] 37494-
8669     

 

 [facility]  [address] [place] [address] 00079-
9358    Attention deficit hyperactivity disorder (ADHD), 
predominantly inattentive type F90.0

 

 [facility]  [address] [address]-
3408     

 

 [facility]  [address] [place] [address] 31293-
4691  10 Niall, 2017   

 

 [facility]  [address] [place] [address] 43822-
0338     

 

 [facility]  [address] [place] [address] 06761-
9014  22 Dec, 2016  Attention deficit hyperactivity disorder (ADHD), 
predominantly inattentive type F90.0

 

 [facility]  [address] [place] [address] 48469-
3177  12 Dec, 2016   

 

 [facility]  [address] [place] [address] 32979-
3554  07 Dec, 2016   

 

 [facility]  [address] [place] [address] 79923-
2215  05 Dec, 2016   

 

 [facility]  [address] [place] [address] 23433-
3926  05 Dec, 2016  Low TSH level R94.6

 

 [facility]  [address] [place] [address] 06885-
8662  05 Dec, 2016   

 

 [facility]  [address] [place] [address] 20174-
7472  02 Dec, 2016   

 

 [facility]  [address] [place] [address] 21136-
6277  10 Nov, 2016   

 

 [facility]  [address] [place] [address] 35329-
6896  10 Nov, 2016   

 

 [facility]  [address] [place] [address] 74221-
3486     

 

 [facility]  [address] [place] [address] 02648-
3207  18 Oct, 2016  Low TSH level R94.6

 

 [facility]  [address] [place] [address] 88371-
7737  17 Oct, 2016  Excessive daytime sleepiness G47.19 and ADHD (attention 
deficit hyperactivity disorder), inattentive type F90.0

 

 [facility]  [address] [place] [address] 52926-
9963  13 Oct, 2016   

 

 [facility]  [address] [place] [address] 55803-
1114  12 Oct, 2016   

 

 [facility]  [address] [place] [address] 91577-
8784  03 Oct, 2016   

 

 [facility]  [address] [place] [address] 99438-
2322  28 Sep, 2016   

 

 [facility]  [address] [place] [address] 88947-
5158  14 Sep, 2016   

 

 [facility]  [address] [place] [address] 83734-
4144  01 Sep, 2016   

 

 [facility]  [address] [place] [address] 48959-
5478  17 Aug, 2016   

 

 [facility]  [address] [place] [address] 87398-
6186  04 Aug, 2016   

 

 [facility]  [address] [place] [address] 88613-
5614  03 Aug, 2016   

 

 [facility]  [address] [place] [address] 81517-
8324    Lumbar neuritis M54.16

 

 [facility]  [address] [place] [address] 89089-
5512     

 

 [facility]  [address] [place] [address] 47187-
6942     

 

 [facility]  [address] [place] [address] 81938-
4577    Lumbar neuritis M54.16

 

 [facility]  [address] [place] [address] 93975-
7336     

 

 [facility]  [address] [place] [address] 25929-
5569     

 

 [facility]  [address] [address]-
0447  26 May, 2016  Lumbar neuritis M54.16

 

 [facility]  [address] [address]-
4974  25 May, 2016   

 

 [facility]  [address] [place] [address] 99288-
3745  10 May, 2016   

 

 [facility]  [address] [place] [address] 20432-
5756    Lumbar neuritis M54.16

 

 [facility]  [address] [place] [address] 54003-
7809     

 

 [facility]  [address] [place] [address] 13209-
4670     

 

 [facility]  [address] [place] [address] 01234-
5185  23 Mar, 2016   

 

 [facility]  [address] [address]-
4124  14 Mar, 2016   

 

 [facility]  [address] [address]-
4487  14 Mar, 2016   

 

 [facility]  [address] [address]-
4899  11 Mar, 2016   

 

 [facility]  [address] [address]-
9793  24 2016   

 

 [facility]  [address] [address]-
6386    Inguinal hernia, right K40.90

 

 [facility]  [address] [address]-
2034  17 2016   

 

 [facility]  [address] [address]-
4049  15 2016  Low back pain M54.5 and Sciatica, unspecified side M54.30

 

 [facility]  [address] [facility] [id number] [address]-
9207     

 

 [facility]  [address] [facility] [id number] [address]-
2040     

 

 [facility]  [address] [facility] [id number] [address]-
2037  30 Dec, 2015   

 

 [facility]  [address] [facility] [id number] [place] [address] 21884-
3393  28 Dec, 2015   

 

 [facility]  [address] [facility] [id number] [place] [address]-
6276  02 Dec, 2015   

 

 [facility]  [id number] [place] [address] 93857-
4219     

 

 [facility]  [address] [address]-
2380     

 

 [facility]  [address] [place] [address] 35625-
5760     

 

 [facility]  [id number] [address]-
8833     

 

 [facility]  [address] [address]-
5793  26 Oct, 2015   

 

 [facility]  [address] [address]-
2386  22 Oct, 2015  Encounter for immunization Z23

 

 [facility]  [address] [facility] [id number] [address]-
9369  07 Oct, 2015   

 

 [facility]  [address] [facility] [id number] [address]-
2052  05 Oct, 2015   

 

 [facility]  [id number] [address]-
0052  09 Sep, 2015   

 

 [facility]  [id number] [address]-
7928  08 Sep, 2015   

 

 [facility]  [address] [place] [address]-
7300  19 Aug, 2015  Lumbago 724.2

 

 [facility] [place] [facility]  [address] [id number] [place]
7813  12 Aug, 2015   

 

 [facility] [place] [facility]  [address] [id number] [place] [address]-
9824    Lumbago 724.2

 

 [facility] [place] [facility]  [address] [id number] [place] [address]-
9141     

 

 [facility] [place] [facility]  [address] [id number] [place] [address]-
3168     

 

 [facility] [place] [facility]  [address] [id number] [place] [address]-
6018     

 

 [facility] [place] [facility]  [address] [id number] [place] [address]-
3377     

 

 [facility] [place] [facility]  [address] [id number] [place] [address]-
0782     

 

 [facility] [place] [facility]  [address] [id number] [place] [address]-
5560  22 May, 2015   

 

 [facility] [place] [facility]  [address] [id number] [place] [address]-
6455     

 

 [facility] [place] [facility]  [address] [id number] [place] [address]-
5626     

 

 [facility] [place] [facility]  [address] [id number] [place] [address]-
5029  30 Mar, 2015   

 

 [facility] [place] [facility]  [address] [id number] [place] [address]-
4481  30 Mar, 2015   

 

 [facility] [place] [facility]  [address] [id number] [place] [address]-
1117  02 Mar, 2015   

 

 [facility] [place] [facility]  [address] [id number] [place] [address]-
7050  02 Mar, 2015   

 

 [facility] [place] [facility]  [address] [id number] [place] [address]-
3589  ,    

 

 [facility] [place] [facility]  [address] [id number] [place] [address]-
3730  ,    

 

 [facility] [place] [facility]  [address] [id number] [place] [address]-
6656     

 

 [facility] [place] [facility]  [address] [id number] [place] [address]-
0996     

 

 [facility] [place] [facility]  [address] [id number] [place] [address]-
3414     

 

 [facility] [place] [facility]  [address] [id number] [place] [address]-
8751     

 

 [facility] [place] [facility]  [address] [id number] [place] [address]-
3003  31 Dec, 2014   

 

 [facility] [place] [facility]  [address] [id number] [place] [address]-
8945  31 Dec, 2014   

 

 [facility] [place] [facility]  [address] [id number] [place] [address]-
7579  22 Dec, 2014   

 

 [facility] [place] [facility]  [address] [id number] [place] [address]-
0718  22 Dec, 2014   

 

 [facility] [place] [facility]  [address] [facility] [id number] [place] [address]-
5380  08 Dec, 2014   

 

 [facility] [place] [facility]  [address] [id number] [place] [address]-
7500  08 Dec, 2014   

 

 [facility] [place] [facility]  [address] [id number] [place] [address]-
4678  10 Nov, 2014   

 

 [facility] [place] [facility]  [address] [id number] [place]-
8798  10 Nov, 2014   

 

 [facility] [place] [facility]  [address] [id number] [place]-
3519  13 Oct, 2014   

 

 [facility] [place] [facility]  [address] [id number] [place]-
6229  13 Oct, 2014   

 

 [facility] [place] [facility]  [address] [id number] [place] [address]-
2656  01 Oct, 2014   

 

 [facility] [place] [facility]  [address] [id number] [place]-
8508  01 Oct, 2014   

 

 [facility] [place] [facility]  [address] [id number] [place] [address]-
6690  15 Sep, 2014   

 

 [facility] [place] [facility]  [address] [id number] [place] [address]-
8439  15 Sep, 2014   

 

 [facility] [place] [facility]  [address] [id number] [place] [address]-
4357  18 Aug, 2014   

 

 [facility] [place] [facility]  [address] [id number] [place] [address]-
4027  18 Aug, 2014   

 

 [facility] [place] [facility]  [address] [id number] [place] [address]-
2525  18 Aug, 2014   

 

 [facility] [place] [facility]  [address] [id number] [place] [address]-
5120  18 Aug, 2014   

 

 [facility] [place] [facility]  [address] [id number] [place] [address]-
8016     

 

 [facility] [place] [facility]  [address] [id number] [place] [address]-
9758     

 

 [facility] [place] [facility]  [address] [id number] [place] [address]-
3315     

 

 [facility] [place] [facility]  [address] [id number] [place] [address]-
1404     

 

 [facility] [place] [facility]  [address] [id number] [place] [address]-
2962     

 

 [facility] [place] [facility]  [address] [id number] [place] [address]-
0519     

 

 [facility] [place] [facility]  [address] [id number] [place] [address]-
4704  30 May, 2014   

 

 [facility] [place] [facility]  [address] [id number] [place] [address]-
1612  28 May, 2014   

 

 [facility] [place] [facility]  [address] [id number] [place] [address]-
6517  28 May, 2014   

 

 [facility] [place] [facility]  [address] [id number] [place] [address]-
4769     

 

 [facility] [place] [facility]  [address] [id number] [place] [address]-
2769     

 

 [facility] [place] [facility]  [address] [id number] [place] [address]-
3488     

 

 [facility] [place] [facility]  [address] [id number] [place] [address]-
3857     

 

 [facility] [place] [facility]  [address] [id number] [place] [address]-
5587     

 

 [facility] [place] [facility]  [address] [id number] [place] [address]-
7718     

 

 [facility] [place] [facility]  [address] [id number] [place] [address]-
5848     

 

 [facility] [place] [facility]  [address] [id number] [place] [address]-
2830     

 

 [facility] [place] [facility]  [address] [id number] [place] [address]-
4926     

 

 [facility] [place] [facility]  [address] [id number] [place] [address]-
7630     

 

 [facility] [place] [facility]  [address] [id number] [place] [address]-
6215     

 

 [facility] [place] [facility]  [address] [id number] [place] [address]-
0021  28 Mar, 2014   

 

 [facility] [place] [facility]  [address] [id number] [place] [address]-
[address]  27 Mar, 2014   

 

 [facility] [place] [facility]  [address] [id number] [place] [address]-
[address]  27 Mar, 2014   

 

 [facility] [place] [facility]  [address] [id number] [place] [address]-
[address]  26 Mar, 2014   

 

 [facility] [place] [facility]  [address] [id number] [place] [address]-
[address]  26 Mar, 2014   

 

 [facility] [place] [facility]  [address] [id number] [place] [address]-
[address]     

 

 [facility] [place] [facility]  [address] [id number] [place] [id number]-
[id number]     

 

 [facility] [place] [facility]  [address] [id number] [place] [address]-
[address]     

 

 [facility] [place] [facility]  [address] [id number] [place] [address]-
1365     

 

 [facility] [place] [facility]  [address] [id number] [place] [address]-
4099     

 

 [facility] [place] [facility]  [address] [id number] [place] [address]-
[address]     

 

 [facility] [place] [facility]  [address] [id number] [place] [address]-
[address]     

 

 [facility] [place] [facility]  [address] [id number] [place] [address]-
3733     

 

 [facility] [place] [facility]  [address] [id number] [place] [address]-
8658     

 

 [facility] [place] [facility]  [address] [id number] [place] [address]-
9447     

 

 [facility] [place] [facility]  [address] [id number] [place] [address]-
0569     

 

 [facility] [place] [facility]  [address] [id number] [place] [address]-
2368     

 

 [facility] [place] [facility]  [address] [id number] [place] [address]-
[address]     

 

 [facility] [place] [facility]  [address] [id number] [place] [address]-
2546  10 Dec, 2013   

 

 [facility]  [address] [facility] [id number] [place] [address]-
[address]46  10 Dec, 2013   

 

 [facility]  [address] [facility] [id number] [place] [address]-
2546     

 

 [facility]  [address] [facility] [id number] [place] [address]-
2546     







IMMUNIZATIONS

No Known Immunizations



SOCIAL HISTORY

Never Assessed



REASON FOR VISIT

Tramadol 18



PLAN OF CARE





VITAL SIGNS





MEDICATIONS







 Medication  Instructions  Dosage  Frequency  Start Date  End Date  Duration  
Status

 

 Tramadol HCl 50 MG  Orally every 6 hrs  1 tablet as needed  6h  02 Dec, 2016  
   28 days  Active







RESULTS

No Results



PROCEDURES

No Known procedures



INSTRUCTIONS





MEDICATIONS ADMINISTERED

No Known Medications



MEDICAL (GENERAL) HISTORY







 Type  Description  Date

 

 Medical History  narcolepsy   

 

 Surgical History  Gallbladder removed  2015

 

 Surgical History  Hernia repair  2016

## 2018-09-02 NOTE — XMS REPORT
[facility]

 Created on: 2018



Sofiya Singh

External Reference #: [id number]

: 1955

Sex: Female



Demographics







 Address  [address] [place]  [address]

 

 Preferred Language  Unknown

 

 Marital Status  Unknown

 

 Jew Affiliation  Unknown

 

 Race  Unknown

 

 Ethnic Group  Unknown





Author







 Author  NAHUN SNYDER

 

 Organization  [facility]

 

 Address  [address] [place]  [address]



 

 Phone  [phone]







Care Team Providers







 Care Team Member Name  Role  Phone

 

 NAHUN SNYDER  Unavailable  [phone]







PROBLEMS







 Type  Condition  ICD9-CM Code  [id number] Code  Onset Dates  Condition Status  
SNOMED Code

 

 Problem  ADHD (attention deficit hyperactivity disorder), inattentive type     
F90.0     Active  34353399

 

 Problem  Arthritis     M19.90     Active  7522181

 

 Problem  Positive skin test for tuberculosis     R76.11     Active  404459282

 

 Problem  Lumbago with sciatica, left side     M54.42     Active  100968517

 

 Problem  Excessive daytime sleepiness     G47.19     Active  966058584007

 

 Problem  Primary narcolepsy without cataplexy     G47.419     Active  
28229490806238

 

 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 
    Active  [id number]







ALLERGIES

No Information



ENCOUNTERS







 Encounter  Location  Date  Diagnosis

 

 [place] [id number] [place] [address] 57480-
7409  04 May, 2018   

 

 [place] [id number] [place] [address] 50269-
6960  14 Mar, 2018  ADHD (attention deficit hyperactivity disorder), 
inattentive type F90.0 ; Lumbago with sciatica, left side M54.42 and Arthritis 
M19.90

 

 [facility]  [id number] [place] [address] 15541-
4158    ADHD (attention deficit hyperactivity disorder), 
inattentive type F90.0 and Lumbar neuritis M54.16

 

 [place] [id number] [place] [address] 48806-
8401     

 

 [place] [id number] [place] [address] 79510-
3909    Lumbar neuritis M54.16

 

 [place] [address] [place] [address] 94091-
3119  16 2018  Low back pain M54.5

 

 [facility]  [id number] [place] [address] 13616-
2554    ADHD (attention deficit hyperactivity disorder), 
inattentive type F90.0

 

 [facility]  [id number] [place] [address] 70145-
9489    Positive skin test for tuberculosis R76.11

 

 [facility]  [id number] [place] [address] 37579-
7545    Positive skin test for tuberculosis R76.11

 

 [facility]  [id number] [place] [address] 27169-
3359     

 

 [facility]  [id number] [place] [address] 27416-
1057    Lumbar neuritis M54.16

 

 [facility]  [id number] [place] [address] 41314-
4985    ADHD (attention deficit hyperactivity disorder), 
inattentive type F90.0

 

 [facility]  [id number] [place] [address] 95037-
2476     

 

 [facility]  [id number] [place] [address] 02196-
4168  20 Dec, 2017  Lumbar neuritis M54.16

 

 [facility]  [address] [place] [address] 59015-
5306  15 Dec, 2017  ADHD (attention deficit hyperactivity disorder), 
inattentive type F90.0

 

 [facility]  [id number] [place] [address] 74643-
3991  12 Dec, 2017   

 

 [facility]  [id number] [place] [address] 90090-
7826  11 Dec, 2017   

 

 [facility]  [id number] [place] [address] 57552-
9907    Lumbar neuritis M54.16

 

 [facility]  [id number] [place] [address] 20058-
2002    ADHD (attention deficit hyperactivity disorder), 
inattentive type F90.0 and Primary narcolepsy without cataplexy G47.419

 

 [facility]  [id number] [place] [address] 90053-
8060  10 Nov, 2017   

 

 [facility]  [id number] [place] [address] 68400-
0274     

 

 [facility]  [id number] [place] [address] 36491-
4564  23 Oct, 2017  Lumbar neuritis M54.16 and ADHD (attention deficit 
hyperactivity disorder), inattentive type F90.0

 

 [facility]  [id number] [place] [address] 90657-
0898  12 Oct, 2017   

 

 [facility]  [id number] [place] [address] 26109-
5300  26 Sep, 2017  Lumbar neuritis M54.16 and ADHD (attention deficit 
hyperactivity disorder), inattentive type F90.0

 

 [facility]  [id number] [place] [address] 50071-
7999  19 Sep, 2017   

 

 [facility]  [id number] [place] [address] 92321-
8788  31 Aug, 2017  ADHD (attention deficit hyperactivity disorder), 
inattentive type F90.0 and Lumbar neuritis M54.16

 

 [facility]  [id number] [place] [address] 12847-
8666  24 Aug, 2017  Lumbar neuritis M54.16

 

 [facility]  [id number] [place] [address] 63806-
3345  23 Aug, 2017   

 

 [facility]  [id number] [place] [address] 81313-
6569  02 Aug, 2017  ADHD (attention deficit hyperactivity disorder), 
inattentive type F90.0 and Lumbar neuritis M54.16

 

 [facility]  [address] [address]-
6058  02 Aug, 2017   

 

 [facility]  [address] [address]-
4248     

 

 [facility]  [address] [address]89-
2522     

 

 [facility]  [address] [address]-
9336     

 

 [facility]  [address] [address]-
9197     

 

 [facility]  [id number] [place] [address] 05174-
6331    ADHD (attention deficit hyperactivity disorder), 
inattentive type F90.0 and Lumbar neuritis M54.16

 

 [facility]  [id number] [address]-
6321     

 

 [facility]  [id number] [address]-
6369     

 

 [facility]  [address] [place] [address] 68314-
2502    Lumbar neuritis M54.16 and ADHD (attention deficit 
hyperactivity disorder), inattentive type F90.0

 

 [facility]  [address] [address]-
6138     

 

 [facility]  [address] [address]-
8496  10 May, 2017  Lumbar neuritis M54.16 and ADHD (attention deficit 
hyperactivity disorder), inattentive type F90.0

 

 [facility]  [address] [address]7220-
5620  03 May, 2017   

 

 [facility]  [address] 6[address]233-
5828  01 May, 2017   

 

 [facility]  [address] [address]-
5113    Narcolepsy due to underlying condition without cataplexy 
G47.429 and Lumbago with sciatica, left side M54.42

 

 [facility]  [id number] [place] [address] 22993-
4770    Lumbar neuritis M54.16 and ADHD (attention deficit 
hyperactivity disorder), inattentive type F90.0

 

 [facility]  [id number] [place] [address] 33425-
5203  31 Mar, 2017   

 

 [facility]  [id number] [place] [address] 87912-
8725  15 Mar, 2017  Lumbar neuritis M54.16 and ADHD (attention deficit 
hyperactivity disorder), inattentive type F90.0

 

 [facility]  [id number] [place] [address] 40561-
8075  15 Mar, 2017   

 

 [facility]  [id number] [place] [address] 91312-
2155  06 Mar, 2017   

 

 [facility]  [id number] [place] [address] 12689-
3964  15 Feb, 2017  ADHD (attention deficit hyperactivity disorder), 
inattentive type F90.0

 

 [facility]  [id number] [place] [address] 38271-
4356  15 Feb, 2017  Lumbar neuritis M54.16

 

 [facility]  [id number] [place] [address] 33902-
5837  08 2017   

 

 [facility]  [id number] [place] [address] 09742-
2046  07 2017   

 

 [facility]  [id number] [place] [address] 76853-
9540    Attention deficit hyperactivity disorder (ADHD), 
predominantly inattentive type F90.0

 

 [facility]  [id number] [place] [address] 84389-
1265     

 

 [facility]  [id number] [place] [address] 86123-
3632  10 Niall, 2017   

 

 [facility]  [address] [place] [address] 68784-
1581     

 

 [facility]  [id number] [place] [address] 51633-
0335  22 Dec, 2016  Attention deficit hyperactivity disorder (ADHD), 
predominantly inattentive type F90.0

 

 [facility]  [id number] [place] [address] 21322-
4920  12 Dec, 2016   

 

 [facility]  [id number] [place] [address] 71951-
8925  07 Dec, 2016   

 

 [facility]  [id number] [place] [address] 10855-
5412  05 Dec, 2016   

 

 [facility]  [id number] [place] [address] 49856-
7221  05 Dec, 2016  Low TSH level R94.6

 

 [facility]  [id number] [place] [address] 32120-
5250  05 Dec, 2016   

 

 [facility]  [id number] [place] [address] 87226-
2018  02 Dec, 2016   

 

 [facility]  [id number] [place] [address] 99026-
2482  10 Nov, 2016   

 

 [facility]  [id number] [place] [address] 60520-
2462  10 Nov, 2016   

 

 [facility]  [id number] [place] [address] 40827-
2106     

 

 [facility]  [address] [place] [address] 45093-
3673  18 Oct, 2016  Low TSH level R94.6

 

 [facility]  [id number] [place] [address] 40428-
8209  17 Oct, 2016  Excessive daytime sleepiness G47.19 and ADHD (attention 
deficit hyperactivity disorder), inattentive type F90.0

 

 [facility]  [id number] [place] [address] 21933-
5567  13 Oct, 2016   

 

 [facility]  [address] [facility] [id number] [place] [address]-
2230  12 Oct, 2016   

 

 [facility] [place] [facility]  [address] [facility] [id number] [place] [address]-
1784  03 Oct, 2016   

 

 [facility]  [address] [facility] [id number] [place] [address]-
5763  28 Sep, 2016   

 

 [facility] [place] [facility]  [address] [facility] [id number] [place] [address], [place] [address]-
9056  14 Sep, 2016   

 

 [facility] [place] [facility]  [address] [facility] [id number] [place] [address]-
8965  01 Sep, 2016   

 

 [facility] [place] [facility]  [id number] [place] [address], [place] [address]-
3225  17 Aug, 2016   

 

 [facility] [place] [facility]  [id number] [place] [address], [place] [address]-
5617  04 Aug, 2016   

 

 [facility] [place] [facility]  [address] [facility] [id number] [place] [address], [place] [address]-
1782  03 Aug, 2016   

 

 [facility]  [id number] [address]-
9626    Lumbar neuritis M54.16

 

 [facility]  [id number] [address]-
4890     

 

 [facility] [place] [facility]  [address] [address]-
2202     

 

 [facility] [place] [facility]  [address] [facility] [id number] [address]-
1890    Lumbar neuritis M54.16

 

 [facility] [place] [facility]  [address] [facility] [id number] [place]-
2749     

 

 [facility] [place] [facility]  [address] [facility] [id number] [place] [address]-
4936     

 

 [facility] [place] [facility]  [address] [address]-
5275  26 May, 2016  Lumbar neuritis M54.16

 

 [facility]  [address] [place] [address]-
0453  25 May, 2016   

 

 [facility]  [id number] [place] [address] 61133-
7578  10 May, 2016   

 

 [facility]  [id number] [place] [address] 07108-
0088    Lumbar neuritis M54.16

 

 [facility]  [id number] [place] [address] 51277-
0973     

 

 [facility]  [id number] [place] [address] 38229-
6404     

 

 [facility]  [id number] [place] [address] 27668-
6202  23 Mar, 2016   

 

 [facility]  [id number] [place] [address] 01948-
4759  14 Mar, 2016   

 

 [facility]  [id number] [place] [address] 99351-
9255  14 Mar, 2016   

 

 [facility]  [id number] [place] [address] 47800-
0304  11 Mar, 2016   

 

 [facility]  [id number] [address]-
3810  24 2016   

 

 [facility]  [id number] [place] [address] 75978-
4455  22 2016  Inguinal hernia, right K40.90

 

 [facility]  [address] [address]-
7574  17 2016   

 

 [facility]  [id number] [place] [address] 38821-
4480  15 2016  Low back pain M54.5 and Sciatica, unspecified side M54.30

 

 [facility]  [address], [place] [address]-
6342     

 

 [facility]  [address] [id number] [place] [address]-
4133     

 

 [facility]  [address] [facility] [id number] [place] [address], [place] [address]-
1511  30 Dec, 2015   

 

 [facility] [place] [facility]  [address] [facility] [id number] [place] [address]-
9434  28 Dec, 2015   

 

 [facility]  [address] [facility] [id number] [place] [address], [place] [address]-
3611  02 Dec, 2015   

 

 [facility] [place] [facility]  [address] [facility] [id number] [place] [address]-
9825     

 

 [facility]  [id number] [place] [address], [place] [address]-
5673     

 

 [facility]  [id number] [place] [address], [place] [address]-
2681     

 

 [facility]  [id number] [place] [address], [place] [address]-
7058     

 

 [facility]  [id number] [place] [address] 22245-
3658  26 Oct, 2015   

 

 [facility]  [id number] [place] [address] 65529-
1562  22 Oct, 2015  Encounter for immunization Z23

 

 [facility]  [address] [address]-
8806  07 Oct, 2015   

 

 [facility]  [id number] [address]-
7493  05 Oct, 2015   

 

 [facility]  [id number] [address]-
3517  09 Sep, 2015   

 

 [facility]  [id number] [place] [address]-
5518  08 Sep, 2015   

 

 [facility]  [id number] [address]-
4850  19 Aug, 2015  Lumbago 724.2

 

 [facility]  [id number] [place] [address]-
1103  12 Aug, 2015   

 

 [facility] [place] [facility]  [address] [id number] [place] [address]-
1918    Lumbago 724.2

 

 [facility] [place] [facility]  [address] [id number] [place] [address]-
2386     

 

 [facility] [place] [facility]  [address] [id number] [place] [address]-
2094     

 

 [facility] [place] [facility]  [address] [id number] [place] [address]-
2729     

 

 [facility] [place] [facility]  [address] [id number] [place] [address]-
7150     

 

 [facility] [place] [facility]  [address] [id number] [place] [address]-
2087     

 

 [facility] [place] [facility]  [address] [id number] [place] [address]-
5531  22 May, 2015   

 

 [facility] [place] [facility]  [address] [id number] [place] [address]-
2284     

 

 [facility] [place] [facility]  [address] [id number] [place] [address]-
8183     

 

 [facility] [place] [facility]  [address] [facility] [id number] [place] [address]-
9315  30 Mar, 2015   

 

 [facility] [place] [facility]  [address] [id number] [place] [address]-
5372  30 Mar, 2015   

 

 [facility] [place] [facility]  [address] [id number] [place] [address]-
5645  02 Mar, 2015   

 

 [facility] [place] [facility]  [address] [id number] [place] [address]-
8656  02 Mar, 2015   

 

 [facility] [place] [facility]  [address] [id number] [place] [address]-
6936     

 

 [facility] [place] [facility]  [address] [id number] [place] [address]-
3058     

 

 [facility] [place] [facility]  [address] [id number] [place] [address]-
5488     

 

 [facility] [place] [facility]  [address] [id number] [place] [address]-
3866     

 

 [facility] [place] [facility]  [address] [facility] [id number] [place] [address]-
7876     

 

 [facility] [place] [facility]  [address] [id number] [place] [address]-
7763     

 

 [facility] [place] [facility]  [address] [id number] [place] [address]-
9266  31 Dec, 2014   

 

 [facility] [place] [facility]  [address] [id number] [place] [address]-
6762  31 Dec, 2014   

 

 [facility] [place] [facility]  [address] [id number] [place] [address]-
1448  22 Dec, 2014   

 

 [facility] [place] [facility]  [address] [id number] [place] [address]-
2564  22 Dec, 2014   

 

 [facility] [place] [facility]  [address] [id number] [place] [address]-
4723  08 Dec, 2014   

 

 [facility] [place] [facility]  [address] [facility] [id number] [place] [address]-
3701  08 Dec, 2014   

 

 [facility] [place] [facility]  [address] [id number] [place] [address]-
6550  10 Nov, 2014   

 

 [facility] [place] [facility]  [address] [id number] [address]-
0016  10 Nov, 2014   

 

 [facility] [place] [facility]  [address] [id number] [place] [address]-
0106  13 Oct, 2014   

 

 [facility] [place] [facility]  [address] [id number] [place] [address]-
4354  13 Oct, 2014   

 

 [facility] [place] [facility]  [address] [facility] [id number] [place] [address]-
0995  01 Oct, 2014   

 

 [facility] [place] [facility]  [address] [id number] [place] [address]-
6925  01 Oct, 2014   

 

 [facility] [place] [facility]  [address] [id number] [place] [address]-
8360  15 Sep, 2014   

 

 [facility] [place] [facility]  [address] [id number] [place]
8725  15 Sep, 2014   

 

 [facility] [place] [facility]  [address] [id number] [place] [address]-
7211  18 Aug, 2014   

 

 [facility] [place] [facility]  [address] [id number] [place] [address]-
8471  18 Aug, 2014   

 

 [facility] [place] [facility]  [address] [id number] [place] [address]-
3419  18 Aug, 2014   

 

 [facility] [place] [facility]  [address] [id number] [place] [address]-
8333  18 Aug, 2014   

 

 [facility] [place] [facility]  [address] [id number] [place] [address]-
5478     

 

 [facility] [place] [facility]  [address] [id number] [place] [address]-
7800     

 

 [facility] [place] [facility]  [address] [id number] [place] [address]-
3733     

 

 [facility] [place] [facility]  [address] [id number] [place] [address]-
4624     

 

 [facility] [place] [facility]  [address] [id number] [place] [address]-
3035     

 

 [facility] [place] [facility]  [address] [id number] [place] [address]-
9341     

 

 [facility] [place] [facility]  [address] [id number] [place] [address]-
0137  30 May, 2014   

 

 [facility] [place] [facility]  [address] [id number] [place] [address]-
8760  28 May, 2014   

 

 [facility] [place] [facility]  [address] [id number] [place] [address]-
3900  28 May, 2014   

 

 [facility] [place] [facility]  [address] [id number] [place] [address]-
1619     

 

 [facility] [place] [facility]  [address] [id number] [place] [address]-
7691     

 

 [facility] [place] [facility]  [address] [id number] [place] [address]-
5652     

 

 [facility] [place] [facility]  [address] [id number] [place] [address]-
3435     

 

 [facility] [place] [facility]  [address] [id number] [place] [address]-
9686     

 

 [facility] [place] [facility]  [address] [id number] [place] [address]-
7176     

 

 [facility] [place] [facility]  [address] [id number] [place] [address]-
7239     

 

 [facility] [place] [facility]  [address] [id number] [place] [address]-
[address]     

 

 [facility] [place] [facility]  [address] [id number] [place] [address]-
[address]     

 

 [facility] [place] [facility]  [address] [id number] [place] [address]-
2293     

 

 [facility] [place] [facility]  [address] [id number] [place] [address]-
65[address]     

 

 [facility] [place] [facility]  [address] [id number] [place] [address]-
1490  28 Mar, 2014   

 

 [facility] [place] [facility]  [address] [id number] [place] [address]-
2640  27 Mar, 2014   

 

 [facility] [place] [facility]  [address] [id number] [place] [address]-
66[address]  27 Mar, 2014   

 

 [facility] [place] [facility]  [address] [id number] [place] [address]-
[address]  26 Mar, 2014   

 

 [facility] [place] [facility]  [address] [id number] [place] [address]-
5575  26 Mar, 2014   

 

 [facility] [place] [facility]  [address] [id number] [place] [address]-
3616     

 

 [facility] [place] [facility]  [address] [id number] [place] [address]-
4256     

 

 [facility] [place] [facility]  [address] [id number] [place] [address]-
8196     

 

 [facility] [place] [facility]  [address] [id number] [place] [place] [address]     

 

 [facility] [place] [facility]  [address] [id number] [place] [address]-
[address]     

 

 [facility] [place] [facility]  [address] [id number] [place] [address]-
5[address]     

 

 [facility] [place] [facility]  [address] [id number] [place] [address]-
6696     

 

 [facility] [place] [facility]  [address] [id number] [place] [address]-
4244     

 

 [facility] [place] [facility]  [address] [id number] [place] [address]-
9863     

 

 [facility] [place] [facility]  [address] [facility] [id number] [place] [address]-
7948     

 

 [facility] [place] [facility]  [address] [id number] [place] [address]-
9580     

 

 [facility] [place] [facility]  [address] [id number] [place] [address]-
8700     

 

 [facility] [place] [facility]  [address] [id number] [place] [address]-
6041     

 

 [facility] [place] [facility]  [address] [id number] [place] [address]-
8559  10 Dec, 2013   

 

 [facility] [place] [facility]  [address] [id number] [place] [address]  10 Dec, 2013   

 

 [facility]  [address] [facility] [id number] [place] [address]-
2083     

 

 [facility]  [address] [facility] [id number] [id number]
[address]     







IMMUNIZATIONS

No Known Immunizations



SOCIAL HISTORY

Never Assessed



REASON FOR VISIT

Refill request



PLAN OF CARE





VITAL SIGNS





MEDICATIONS

Unknown Medications



RESULTS

No Results



PROCEDURES

No Known procedures



INSTRUCTIONS





MEDICATIONS ADMINISTERED

No Known Medications



MEDICAL (GENERAL) HISTORY







 Type  Description  Date

 

 Medical History  narcolepsy   

 

 Surgical History  Gallbladder removed  2015

 

 Surgical History  Hernia repair  2016

## 2018-09-02 NOTE — XMS REPORT
[facility]

 Created on: 2016



Samantha Danykath

External Reference #: [id number]

: 1955

Sex: Female



Demographics







 Address  [address] [place]  [address]

 

 Home Phone  [phone]

 

 Preferred Language  Unknown

 

 Marital Status  Unknown

 

 Christian Affiliation  Unknown

 

 Race  White

 

 Ethnic Group  Not  or 





Author







 Author  NAHUN SNYDER

 

 Organization  [employer]

 

 Address  Unknown

 

 Phone  Unavailable







Care Team Providers







 Care Team Member Name  Role  Phone

 

 NAHUN SNYDER  CP  Unavailable



                                                                



Allergies

          No Known Allergies                                                   
                                     



Problems

          





 Problem Type  Condition  Code  Onset Dates  Condition Status

 

 Problem  Pain in joint, pelvic region and thigh  719.45     Active

 

 Problem  Anxiety state, unspecified  300.00     Active

 

 Problem  Lumbago  724.2     Active

 

 Problem  Screening examination for pulmonary tuberculosis  V74.1     Active

 

 Problem  Pneumonia, organism unspecified  486     Active

 

 Problem  ADHD (attention deficit hyperactivity disorder), inattentive type  
F90.0     Active

 

 Problem  Unspecified arthropathy, site unspecified  716.90     Active

 

 Problem  Excessive daytime sleepiness  G47.19     Active

 

 Problem  Family history of malignant neoplasm of breast  V16.3     Active

 

 Problem  Family history of diabetes mellitus  V18.0     Active

 

 Problem  Family history of ischemic heart disease  V17.3     Active

 

 Problem  Family history of other cardiovascular diseases  V17.49     Active



                                                                               
                                                                               
                                        



Medications

          





 Medication  Code System  Code  Instructions  Start Date  End Date  Status  
Dosage

 

 Alprazolam  NDC  [address]-10  1 MG  Three times a day  2015        
1 tablet



                                                                              



Results

          No Known Results                                                     
               



Summary Purpose

          [employer] Submission

## 2018-09-02 NOTE — XMS REPORT
[facility]

 Created on: 2017



Sofiya Singh

External Reference #: [id number]

: 1955

Sex: Female



Demographics







 Address  [address] [place]  [address]

 

 Preferred Language  Unknown

 

 Marital Status  Unknown

 

 Mandaeism Affiliation  Unknown

 

 Race  Unknown

 

 Ethnic Group  Unknown





Author







 Author  NAHUN SNYDER

 

 [facility]

 

 Address  [address] [place]  [address]



 

 Phone  [phone]







Care Team Providers







 Care Team Member Name  Role  Phone

 

 NAHUN SNYDER  Unavailable  [phone]







PROBLEMS







 Type  Condition  ICD9-CM Code  IUS20-RM Code  Onset Dates  Condition Status  
SNOMED Code

 

 Problem  Lumbago with sciatica, left side     M54.42     Active  380611994

 

 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 
    Active  [id number]

 

 Problem  ADHD (attention deficit hyperactivity disorder), inattentive type     
F90.0     Active  80412110

 

 Problem  Excessive daytime sleepiness     G47.19     Active  [id number]







ALLERGIES

Unknown Allergies



SOCIAL HISTORY

No smoking Hx information available



PLAN OF CARE





VITAL SIGNS





MEDICATIONS

Unknown Medications



RESULTS

No Results



PROCEDURES

No Known procedures



IMMUNIZATIONS

No Known Immunizations

## 2018-09-02 NOTE — XMS REPORT
[facility]

 Created on: 2015



Samantha Danykath

External Reference #: [id number]

: 1955

Sex: Female



Demographics







 Address  [address] [place]  [address]

 

 Home Phone  [phone]

 

 Preferred Language  Unknown

 

 Marital Status  Unknown

 

 Buddhist Affiliation  Unknown

 

 Race  White

 

 Ethnic Group  Not  or 





Author







 Author  NAHUN SNYDER

 

 Organization  [employer]

 

 Address  Unknown

 

 Phone  Unavailable







Care Team Providers







 Care Team Member Name  Role  Phone

 

 NAHUN SNYDER  CP  Unavailable



                                                                



Allergies

          No Known Allergies                                                   
                                     



Problems

          





 Problem Type  Condition  Code  Onset Dates  Condition Status

 

 Problem  Screening examination for pulmonary tuberculosis  V74.1     Active

 

 Problem  Pain in joint, pelvic region and thigh  719.45     Active

 

 Problem  Pneumonia, organism unspecified  486     Active

 

 Problem  Family history of ischemic heart disease  V17.3     Active

 

 Problem  Family history of other cardiovascular diseases  V17.49     Active

 

 Problem  Unspecified arthropathy, site unspecified  716.90     Active

 

 Problem  Anxiety state, unspecified  300.00     Active

 

 Problem  Lumbago  724.2     Active

 

 Problem  Family history of malignant neoplasm of breast  V16.3     Active

 

 Problem  Family history of diabetes mellitus  V18.0     Active



                                                                               
                                                                               
                    



Medications

          





 Medication  Code System  Code  Instructions  Start Date  End Date  Status  
Dosage

 

 Alprazolam  NDC  [address]-10  1 MG    2015        take 1 tablet (1 
mg) by oral route 3 times per day

 

 tramadol  NDC  0  50 mg    2015        take 1 tablet (50 mg) by oral 
route every 6 hours as needed

 

 Hydrocodone-Acetaminophen  NDC  [address]-60   MG    2015    
    take 1 tablet by oral route every 6 hours as needed for pain PRN



                                                                               
                   



Results

          No Known Results                                                     
               



Summary Purpose

          [employer] Submission

## 2018-09-02 NOTE — XMS REPORT
[facility]

 Created on: 2018



Sofiya Singh

External Reference #: [id number]

: 1955

Sex: Female



Demographics







 Address  [address] [place]  [address]

 

 Preferred Language  Unknown

 

 Marital Status  Unknown

 

 Oriental orthodox Affiliation  Unknown

 

 Race  Unknown

 

 Ethnic Group  Unknown





Author







 Author  NAHUN SNYDER

 

 Organization  [facility]

 

 Address  [address] [place]  [address]



 

 Phone  [phone]







Care Team Providers







 Care Team Member Name  Role  Phone

 

 NAHUN SNYDER  Unavailable  [phone]







PROBLEMS







 Type  Condition  ICD9-CM Code  GJU58-PY Code  Onset Dates  Condition Status  
SNOMED Code

 

 Problem  ADHD (attention deficit hyperactivity disorder), inattentive type     
F90.0     Active  69723530

 

 Problem  Arthritis     M19.90     Active  8269414

 

 Problem  Positive skin test for tuberculosis     R76.11     Active  023860289

 

 Problem  Lumbago with sciatica, left side     M54.42     Active  013872404

 

 Problem  Excessive daytime sleepiness     G47.19     Active  043173904201

 

 Problem  Primary narcolepsy without cataplexy     G47.419     Active  
58072201312480

 

 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 
    Active  [id number]







ALLERGIES

No Information



ENCOUNTERS







 Encounter  Location  Date  Diagnosis

 

 [place] [id number] [place] [address] 98781-
1531     

 

 [place] [id number] [place] [address] 66628-
9966  06 2018  Right lower quadrant abdominal pain R10.31 and Rash R21

 

 [place] [address] [address]-
4673  14 Mar, 2018  ADHD (attention deficit hyperactivity disorder), 
inattentive type F90.0 ; Lumbago with sciatica, left side M54.42 and Arthritis 
M19.90

 

 [place] [address] [address]-
3457    ADHD (attention deficit hyperactivity disorder), 
inattentive type F90.0 and Lumbar neuritis M54.16

 

 [place] [id number] [place] [address] 11460-
0300     

 

 [facility]  [address] [place] [address] 79557-
0948    Lumbar neuritis M54.16

 

 [facility]  [id number] [place] [address] 01941-
1382  16 2018  Low back pain M54.5

 

 [facility]  [id number] [place] [address] 76626-
9473    ADHD (attention deficit hyperactivity disorder), 
inattentive type F90.0

 

 [facility]  [id number] [place] [address] 58727-
9042    Positive skin test for tuberculosis R76.11

 

 [facility]  [id number] [place] [address] 84862-
1383    Positive skin test for tuberculosis R76.11

 

 [facility]  [id number] [place] [address] 27763-
1938     

 

 [facility]  [id number] [place] [address] 58166-
6924    Lumbar neuritis M54.16

 

 [facility]  [id number] [place] [address] 99532-
0808    ADHD (attention deficit hyperactivity disorder), 
inattentive type F90.0

 

 [facility]  [id number] [place] [address] 87352-
4381     

 

 [facility]  [id number] [place] [address] 64410-
4200  20 Dec, 2017  Lumbar neuritis M54.16

 

 [facility]  [id number] [place] [address] 03510-
9136  15 Dec, 2017  ADHD (attention deficit hyperactivity disorder), 
inattentive type F90.0

 

 [facility]  [id number] [place] [address] 04542-
9373  12 Dec, 2017   

 

 [facility]  [id number] [place] [address] 06034-
4345  11 Dec, 2017   

 

 [facility]  [address] [place] [address] 91553-
4856    Lumbar neuritis M54.16

 

 [facility]  [id number] [place] [address] 88293-
0301    ADHD (attention deficit hyperactivity disorder), 
inattentive type F90.0 and Primary narcolepsy without cataplexy G47.419

 

 [facility]  [id number] [place] [address] 33239-
1063  10 Nov, 2017   

 

 [facility]  [id number] [place] [address] 61814-
5111     

 

 [facility]  [id number] [place] [address] 69932-
4864  23 Oct, 2017  Lumbar neuritis M54.16 and ADHD (attention deficit 
hyperactivity disorder), inattentive type F90.0

 

 [facility]  [id number] [place] [address] 17647-
1658  12 Oct, 2017   

 

 [facility]  [id number] [place] [address] 55268-
5595  26 Sep, 2017  Lumbar neuritis M54.16 and ADHD (attention deficit 
hyperactivity disorder), inattentive type F90.0

 

 [facility]  [id number] [place] [address] 61581-
0800  19 Sep, 2017   

 

 [facility]  [id number] [place] [address] 47532-
2400  31 Aug, 2017  ADHD (attention deficit hyperactivity disorder), 
inattentive type F90.0 and Lumbar neuritis M54.16

 

 [facility]  [id number] [place] [address] 65863-
7866  24 Aug, 2017  Lumbar neuritis M54.16

 

 [facility]  [id number] [place] [address] 21974-
7110  23 Aug, 2017   

 

 [facility]  [id number] [place] [address] 88430-
3764  02 Aug, 2017  ADHD (attention deficit hyperactivity disorder), 
inattentive type F90.0 and Lumbar neuritis M54.16

 

 [facility]  [address], [place] [address]-
0363  02 Aug, 2017   

 

 [facility]  [id number] [address]-
0300     

 

 [facility]  [id number] [place] [address] 24374-
4681     

 

 [facility]  [id number] [place] [address] 71036-
1645     

 

 [facility]  [id number] [place] [address] 43411-
9899     

 

 [facility]  [id number] [place] [address] 46271-
8666    ADHD (attention deficit hyperactivity disorder), 
inattentive type F90.0 and Lumbar neuritis M54.16

 

 [facility]  [address], [place] [address]-
3752     

 

 [facility]  [id number] [place] [address] 59620-
6981     

 

 [facility]  [address] [place] [address] 35465-
9944    Lumbar neuritis M54.16 and ADHD (attention deficit 
hyperactivity disorder), inattentive type F90.0

 

 [facility]  [address], [place] [address]-
8811     

 

 [facility]  [id number] [place] [address] 19937-
2464  10 May, 2017  Lumbar neuritis M54.16 and ADHD (attention deficit 
hyperactivity disorder), inattentive type F90.0

 

 [facility]  [address] [address]-
5694  03 May, 2017   

 

 [facility]  [id number] [place] [address] 71273-
8888  01 May, 2017   

 

 [facility]  [id number] [place] [address] 46287-
2749    Narcolepsy due to underlying condition without cataplexy 
G47.429 and Lumbago with sciatica, left side M54.42

 

 [facility]  [id number] [place] [address] 00378-
8729    Lumbar neuritis M54.16 and ADHD (attention deficit 
hyperactivity disorder), inattentive type F90.0

 

 [facility]  [id number] [place] [address] 03116-
9472  31 Mar, 2017   

 

 [facility]  [id number] [place] [address] 15660-
9052  15 Mar, 2017  Lumbar neuritis M54.16 and ADHD (attention deficit 
hyperactivity disorder), inattentive type F90.0

 

 [facility]  [id number] [place] [address] 93768-
3796  15 Mar, 2017   

 

 [facility]  [id number] [place] [address] 00781-
4467  06 Mar, 2017   

 

 [facility]  [id number] [place] [address] 51138-
7086  15 Feb, 2017  ADHD (attention deficit hyperactivity disorder), 
inattentive type F90.0

 

 [facility]  [id number] [place] [address] 22426-
9807  15 Feb, 2017  Lumbar neuritis M54.16

 

 [facility]  [id number] [place] [address] 06930-
1730     

 

 [facility]  [id number] [place] [address] 71881-
4137     

 

 [facility]  [address] [place] [address] 81034-
2164    Attention deficit hyperactivity disorder (ADHD), 
predominantly inattentive type F90.0

 

 [facility]  [address] [address]-
9174     

 

 [facility]  [address] [place] [address] 49275-
9766  10 Niall, 2017   

 

 [facility]  [address] [place] [address] 93644-
7012     

 

 [facility]  [id number] [place] [address] 06936-
9145  22 Dec, 2016  Attention deficit hyperactivity disorder (ADHD), 
predominantly inattentive type F90.0

 

 [facility]  [id number] [place] [address] 00545-
7642  12 Dec, 2016   

 

 [facility]  [address] [place] [address] 41638-
3574  07 Dec, 2016   

 

 [facility]  [address] [place] [address] 00873-
5190  05 Dec, 2016   

 

 [facility]  [id number] [place] [address] 93568-
2303  05 Dec, 2016  Low TSH level R94.6

 

 [facility]  [address] [place] [address] 91476-
2494  05 Dec, 2016   

 

 [facility]  [address] [place] [address] 54733-
7903  02 Dec, 2016   

 

 [facility]  [address] [place] [address] 01766-
8126  10 Nov, 2016   

 

 [facility]  [id number] [place] [address] 46027-
6684  10 Nov, 2016   

 

 [facility]  [address] [place] [address] 11323-
9856     

 

 [facility]  [address] [place] [address] 95446-
0569  18 Oct, 2016  Low TSH level R94.6

 

 [facility]  [address] [place] [address] 82213-
6297  17 Oct, 2016  Excessive daytime sleepiness G47.19 and ADHD (attention 
deficit hyperactivity disorder), inattentive type F90.0

 

 [facility]  [address] [place] [address] 60716-
8834  13 Oct, 2016   

 

 [facility]  [address] [place] [address] 06436-
3729  12 Oct, 2016   

 

 [facility]  [address] [place] [address] 64064-
1974  03 Oct, 2016   

 

 [facility]  [address] [place] [address] 88495-
2086  28 Sep, 2016   

 

 [facility]  [address] [place] [address] 41681-
7452  14 Sep, 2016   

 

 [facility]  [address] [place] [address] 01872-
6805  01 Sep, 2016   

 

 [facility]  [address] [place] [address] 02204-
8620  17 Aug, 2016   

 

 [facility]  [address] [place] [address] 64978-
3185  04 Aug, 2016   

 

 [facility]  [address] [place] [address] 68939-
7214  03 Aug, 2016   

 

 [facility]  [address] [place] [address] 22564-
1332    Lumbar neuritis M54.16

 

 [facility]  [id number] [place] [address] 09009-
6636     

 

 [facility]  [id number] [place] [address] 42870-
2358     

 

 [facility]  [address] [place] [address] 00779-
5190    Lumbar neuritis M54.16

 

 [facility]  [id number] [place] [address] 28256-
5139     

 

 [facility]  [address] [place] [address] 85578-
1796     

 

 [facility]  [address], [place] [address]-
5680  26 May, 2016  Lumbar neuritis M54.16

 

 [facility]  [address] [address]-
0561  25 May, 2016   

 

 [facility]  [id number] [place] [address] 59089-
8099  10 May, 2016   

 

 [facility]  [address] [place] [address] 46626-
3873    Lumbar neuritis M54.16

 

 [facility]  [id number] [place] [address] 64239-
4230     

 

 [facility]  [address] [place] [address] 66589-
6238     

 

 [facility]  [address] [place] [address] 56051-
3573  23 Mar, 2016   

 

 [facility]  [address], [place] [address]-
1428  14 Mar, 2016   

 

 [facility]  [address] [place] [address]-
0937  14 Mar, 2016   

 

 [facility]  [address] [place] [address]-
2351  11 Mar, 2016   

 

 [facility]  [address] [place] [address]-
1530  24 2016   

 

 [facility]  [address] [address]-
9366    Inguinal hernia, right K40.90

 

 [facility]  [address], [place] [address]-
8195  17 2016   

 

 [facility]  [address], [place] [address]-
7173  15 2016  Low back pain M54.5 and Sciatica, unspecified side M54.30

 

 [facility]  [address] [facility] [id number] [address]-
9472     

 

 [facility]  [address] [facility] [id number], [place] [address]-
3722     

 

 [facility]  [address] [facility] [id number] [address]-
4377  30 Dec, 2015   

 

 [facility]  [address] [facility] [id number] [place] [address] 30294-
2950  28 Dec, 2015   

 

 [facility]  [address] [facility] [id number] [place] [address]-
8087  02 Dec, 2015   

 

 [facility]  [id number] [place] [address] 80455-
4460     

 

 [facility]  [address], [place] [address]-
1586     

 

 [facility]  [address] [place] [address] 11832-
8287     

 

 [facility]  [id number], [place] [address]-
3683     

 

 [facility]  [address], [place] [address]-
3265  26 Oct, 2015   

 

 [facility]  [address], [place] [address]-
0341  22 Oct, 2015  Encounter for immunization Z23

 

 [facility]  [address] [facility] [id number] [address]-
5863  07 Oct, 2015   

 

 [facility]  [address] [facility] [id number] [address]-
1480  05 Oct, 2015   

 

 [facility]  [id number], [place] [address]-
9800  09 Sep, 2015   

 

 [facility]  [id number], [place] [address]-
7253  08 Sep, 2015   

 

 [facility]  [address] [place] [address]-
7653  19 Aug, 2015  Lumbago 724.2

 

 [facility] [place] [facility]  [address] [id number] [place]-
5969  12 Aug, 2015   

 

 [facility] [place] [facility]  [address] [id number] [place] [address]-
2442    Lumbago 724.2

 

 [facility] [place] [facility]  [address] [id number] [place] [address]-
5146     

 

 [facility] [place] [facility]  [address] [id number] [place] [address]-
3835     

 

 [facility] [place] [facility]  [address] [id number] [place] [address]-
9363     

 

 [facility] [place] [facility]  [address] [id number] [place] [address]-
9862     

 

 [facility] [place] [facility]  [address] [id number] [place] [address]-
0082     

 

 [facility] [place] [facility]  [address] [id number] [place] [address]-
0978  22 May, 2015   

 

 [facility] [place] [facility]  [address] [id number] [place] [address]-
1054     

 

 [facility] [place] [facility]  [address] [id number] [place] [address]-
2770     

 

 [facility] [place] [facility]  [address] [id number] [place] [address]-
0856  30 Mar, 2015   

 

 [facility] [place] [facility]  [address] [id number] [place] [address]-
0535  30 Mar, 2015   

 

 [facility] [place] [facility]  [address] [id number] [place] [address]-
0603  02 Mar, 2015   

 

 [facility] [place] [facility]  [address] [id number] [place] [address]-
6554  02 Mar, 2015   

 

 [facility] [place] [facility]  [address] [id number] [place] [address]-
3818  ,    

 

 [facility] [place] [facility]  [address] [id number] [place] [address]-
8503  ,    

 

 [facility] [place] [facility]  [address] [id number] [place] [address]-
0546     

 

 [facility] [place] [facility]  [address] [id number] [place] [address]-
2416     

 

 [facility] [place] [facility]  [address] [id number] [place] [address]-
1313     

 

 [facility] [place] [facility]  [address] [id number] [place] [address]-
3965     

 

 [facility] [place] [facility]  [address] [id number] [place] [address]-
0979  31 Dec, 2014   

 

 [facility] [place] [facility]  [address] [id number] [place] [address]-
9543  31 Dec, 2014   

 

 [facility] [place] [facility]  [address] [id number] [place] [address]-
8820  22 Dec, 2014   

 

 [facility] [place] [facility]  [address] [id number] [place] [address]-
4557  22 Dec, 2014   

 

 [facility] [place] [facility]  [address] [facility] [id number] [place] [address]-
5043  08 Dec, 2014   

 

 [facility] [place] [facility]  [address] [id number] [place] [address]-
0197  08 Dec, 2014   

 

 [facility] [place] [facility]  [address] [id number] [place] [address]-
8279  10 Nov, 2014   

 

 [facility] [place] [facility]  [address] [id number] [place]-
4080  10 Nov, 2014   

 

 [facility] [place] [facility]  [address] [id number] [place]-
2508  13 Oct, 2014   

 

 [facility] [place] [facility]  [address] [id number] [place]-
2079  13 Oct, 2014   

 

 [facility] [place] [facility]  [address] [id number] [place] [address]-
4554  01 Oct, 2014   

 

 [facility] [place] [facility]  [address] [id number] [place]-
2504  01 Oct, 2014   

 

 [facility] [place] [facility]  [address] [id number] [place] [address]-
7000  15 Sep, 2014   

 

 [facility] [place] [facility]  [address] [id number] [place] [address]-
8590  15 Sep, 2014   

 

 [facility] [place] [facility]  [address] [id number] [place] [address]-
3231  18 Aug, 2014   

 

 [facility] [place] [facility]  [address] [id number] [place] [address]-
1520  18 Aug, 2014   

 

 [facility] [place] [facility]  [address] [id number] [place] [address]-
8846  18 Aug, 2014   

 

 [facility] [place] [facility]  [address] [id number] [place] [address]-
6438  18 Aug, 2014   

 

 [facility] [place] [facility]  [address] [id number] [place] [address]-
9647     

 

 [facility] [place] [facility]  [address] [id number] [place] [address]-
7842     

 

 [facility] [place] [facility]  [address] [id number] [place] [address]-
7276     

 

 [facility] [place] [facility]  [address] [id number] [place] [address]-
7737     

 

 [facility] [place] [facility]  [address] [id number] [place] [address]-
5786     

 

 [facility] [place] [facility]  [address] [id number] [place] [address]-
9060     

 

 [facility] [place] [facility]  [address] [id number] [place] [address]-
1828  30 May, 2014   

 

 [facility] [place] [facility]  [address] [id number] [place] [address]-
0496  28 May, 2014   

 

 [facility] [place] [facility]  [address] [id number] [place] [address]-
2412  28 May, 2014   

 

 [facility] [place] [facility]  [address] [id number] [place] [address]-
9111     

 

 [facility] [place] [facility]  [address] [id number] [place] [address]-
6617     

 

 [facility] [place] [facility]  [address] [id number] [place] [address]-
7117     

 

 [facility] [place] [facility]  [address] [id number] [place] [address]-
0672     

 

 [facility] [place] [facility]  [address] [id number] [place] [address]-
1744     

 

 [facility] [place] [facility]  [address] [id number] [place] [address]-
7347     

 

 [facility] [place] [facility]  [address] [id number] [place] [address]-
6662     

 

 [facility] [place] [facility]  [address] [id number] [place] [address]-
1063     

 

 [facility] [place] [facility]  [address] [id number] [place] [address]-
7153     

 

 [facility] [place] [facility]  [address] [id number] [place] [address]-
6233     

 

 [facility] [place] [facility]  [address] [id number] [place] [address]-
3653     

 

 [facility] [place] [facility]  [address] [id number] [place] [address]-
6819  28 Mar, 2014   

 

 [facility] [place] [facility]  [address] [id number] [place] [address]-
5885  27 Mar, 2014   

 

 [facility] [place] [facility]  [address] [id number] [place] [address]-
9969  27 Mar, 2014   

 

 [facility] [place] [facility]  [address] [id number] [place] [address]-
3245  26 Mar, 2014   

 

 [facility] [place] [facility]  [address] [id number] [place] [address]-
1134  26 Mar, 2014   

 

 [facility] [place] [facility]  [address] [id number] [place] [address]-
8048     

 

 [facility] [place] [facility]  [address] [id number] [place] [address]-
2547     

 

 [facility] [place] [facility]  [address] [id number] [place] [address]-
5304     

 

 [facility] [place] [facility]  [address] [id number] [place] [address]-
9830     

 

 [facility] [place] [facility]  [address] [id number] [place] [address]-
6451     

 

 [facility] [place] [facility]  [address] [id number] [place] [address]-
9222     

 

 [facility] [place] [facility]  [address] [id number] [place] [address]-
1505     

 

 [facility] [place] [facility]  [address] [id number] [place] [address]-
5576     

 

 [facility] [place] [facility]  [address] [id number] [place] [address]-
6178     

 

 [facility] [place] [facility]  [address] [id number] [place] [address]-
1838     

 

 [facility] [place] [facility]  [address] [id number] [place] [address]-
6412     

 

 [facility] [place] [facility]  [address] [id number] [place] [address]-
0085     

 

 [facility] [place] [facility]  [address] [id number] [place] [address]-
7217     

 

 [facility] [place] [facility]  [address] [id number] [place] [address]-
5786  10 Dec, 2013   

 

 [facility]  [address] [facility] [id number] [place] [address]-
1806  10 Dec, 2013   

 

 [facility] System  [address] [facility] [id number], [place] [address]     

 

 [facility]  [address] [facility] [id number], [place] [address]     







IMMUNIZATIONS

No Known Immunizations



SOCIAL HISTORY

Never Assessed



REASON FOR VISIT

[employer] CBrumbackRN



PLAN OF CARE





VITAL SIGNS





MEDICATIONS

Unknown Medications



RESULTS







 Name  Result  Date  Reference Range

 

 [employer]     2018   







PROCEDURES







 Procedure  Date Ordered  Result  Body Site

 

 No Charge  2018      







INSTRUCTIONS





MEDICATIONS ADMINISTERED

No Known Medications



MEDICAL (GENERAL) HISTORY







 Type  Description  Date

 

 Medical History  narcolepsy   

 

 Surgical History  Gallbladder removed  2015

 

 Surgical History  Hernia repair  2016

## 2018-09-02 NOTE — XMS REPORT
[facility]

 Created on: 10/26/2017



Sofiya Singh

External Reference #: [id number]

: 1955

Sex: Female



Demographics







 Address  [address] [place]  [address]

 

 Preferred Language  Unknown

 

 Marital Status  Unknown

 

 [employer] Affiliation  Unknown

 

 Race  Unknown

 

 Ethnic Group  Unknown





Author







 Author  NAHUN SNYDER

 

 [facility]

 

 Address  [address] [place]  [address]



 

 Phone  [phone]







Care Team Providers







 Care Team Member Name  Role  Phone

 

 NAHUN SNYDER  Unavailable  [phone]







PROBLEMS







 Type  Condition  ICD9-CM Code  [id number] Code  Onset Dates  Condition Status  
SNOMED Code

 

 Problem  Lumbago with sciatica, left side     M54.42     Active  832315739

 

 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 
    Active  31003189212229

 

 Problem  ADHD (attention deficit hyperactivity disorder), inattentive type     
F90.0     Active  64638991

 

 Problem  Excessive daytime sleepiness     G47.19     Active  [id number]







ALLERGIES

No Information



SOCIAL HISTORY

Never Assessed



PLAN OF CARE





VITAL SIGNS





MEDICATIONS

Unknown Medications



RESULTS

No Results



PROCEDURES

No Known procedures



IMMUNIZATIONS

No Known Immunizations



MEDICAL (GENERAL) HISTORY







 Type  Description  Date

 

 Surgical History  Gallbladder removed  2015

 

 Surgical History  Hernia repair  2016

## 2018-09-02 NOTE — XMS REPORT
[facility]

 Created on: 2015



Sofiya Singh

External Reference #: [id number]

: 1955

Sex: Female



Demographics







 Address  [address] [place]  [address]

 

 Home Phone  [phone]

 

 Preferred Language  Unknown

 

 Marital Status  Unknown

 

 L[employer] day Affiliation  Unknown

 

 Race  White

 

 Ethnic Group  Not  or 





Author







 Author  NAHUN SNYDER

 

 Organization  [employer]

 

 Address  Unknown

 

 Phone  Unavailable







Care Team Providers







 Care Team Member Name  Role  Phone

 

 NAHUN SNYDER  CP  Unavailable



                                                                



Allergies

          No Known Allergies                                                   
                                     



Problems

          





 Problem Type  Condition  Code  Onset Dates  Condition Status

 

 Problem  Screening examination for pulmonary tuberculosis  V74.1     Active

 

 Problem  Pain in joint, pelvic region and thigh  719.45     Active

 

 Problem  Pneumonia, organism unspecified  486     Active

 

 Problem  Family history of ischemic heart disease  V17.3     Active

 

 Problem  Family history of other cardiovascular diseases  V17.49     Active

 

 Problem  Unspecified arthropathy, site unspecified  716.90     Active

 

 Problem  Anxiety state, unspecified  300.00     Active

 

 Problem  Lumbago  724.2     Active

 

 Problem  Family history of malignant neoplasm of breast  V16.3     Active

 

 Problem  Family history of diabetes mellitus  V18.0     Active



                                                                               
                                                                               
                    



Medications

          No Known Medications                                                 
                             



Results

          No Known Results                                                     
               



Summary Purpose

          [employer] Submission

## 2018-09-02 NOTE — XMS REPORT
[facility]

 Created on: 2018



Sofiya Singh

External Reference #: [id number]

: 1955

Sex: Female



Demographics







 Address  [address] [place]  [address]

 

 Preferred Language  Unknown

 

 Marital Status  Unknown

 

 Cheondoism Affiliation  Unknown

 

 Race  Unknown

 

 Ethnic Group  Unknown





Author







 Author  NAHUN SNYDER

 

 Organization  [facility]

 

 Address  [address] [place]  [address]



 

 Phone  [phone]







Care Team Providers







 Care Team Member Name  Role  Phone

 

 NAHUN SNYDER  Unavailable  [phone]







PROBLEMS







 Type  Condition  ICD9-CM Code  YNX21-KU Code  Onset Dates  Condition Status  
SNOMED Code

 

 Problem  ADHD (attention deficit hyperactivity disorder), inattentive type     
F90.0     Active  91061756

 

 Problem  Arthritis     M19.90     Active  7675836

 

 Problem  Positive skin test for tuberculosis     R76.11     Active  301389815

 

 Problem  Lumbago with sciatica, left side     M54.42     Active  351081924

 

 Problem  Excessive daytime sleepiness     G47.19     Active  396154057890

 

 Problem  Primary narcolepsy without cataplexy     G47.419     Active  
[id number]

 

 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 
    Active  [id number]







ALLERGIES

No Information



ENCOUNTERS







 Encounter  Location  Date  Diagnosis

 

 [place] [id number] [place] [address] 96178-
2520    Right lower quadrant abdominal pain R10.31 and Rash R21

 

 [place] [id number] [place] [address] 57883-
5918  14 Mar, 2018  ADHD (attention deficit hyperactivity disorder), 
inattentive type F90.0 ; Lumbago with sciatica, left side M54.42 and Arthritis 
M19.90

 

 [facility]  [address] [place] [address] 12327-
8936    ADHD (attention deficit hyperactivity disorder), 
inattentive type F90.0 and Lumbar neuritis M54.16

 

 [facility]  [id number] [place] [address] 64618-
9209     

 

 [place] [id number] [place] [address] 24790-
5451    Lumbar neuritis M54.16

 

 [facility]  [address] [place] [address] 59755-
1649  16 2018  Low back pain M54.5

 

 [facility]  [id number] [place] [address] 44025-
0818  11 2018  ADHD (attention deficit hyperactivity disorder), 
inattentive type F90.0

 

 [facility]  [id number] [place] [address] 58950-
0881  09 2018  Positive skin test for tuberculosis R76.11

 

 [facility]  [id number] [place] [address] 40306-
1486    Positive skin test for tuberculosis R76.11

 

 [facility]  [id number] [place] [address] 70100-
4774     

 

 [facility]  [id number] [place] [address] 61336-
9307    Lumbar neuritis M54.16

 

 [facility]  [id number] [place] [address] 25513-
2171    ADHD (attention deficit hyperactivity disorder), 
inattentive type F90.0

 

 [facility]  [address] [place] [address] 22223-
3754     

 

 [facility]  [address] [place] [address] 35371-
2294  20 Dec, 2017  Lumbar neuritis M54.16

 

 [facility]  [id number] [place] [address] 59874-
9357  15 Dec, 2017  ADHD (attention deficit hyperactivity disorder), 
inattentive type F90.0

 

 [facility]  [id number] [place] [address] 00848-
4587  12 Dec, 2017   

 

 [facility]  [id number] [place] [address] 41247-
0888  11 Dec, 2017   

 

 [facility]  [id number] [place] [address] 75924-
8583    Lumbar neuritis M54.16

 

 [facility]  [id number] [place] [address] 08282-
5354    ADHD (attention deficit hyperactivity disorder), 
inattentive type F90.0 and Primary narcolepsy without cataplexy G47.419

 

 [facility]  [id number] [place] [address] 81419-
3519  10 Nov, 2017   

 

 [facility]  [id number] [place] [address] 91411-
4695     

 

 [facility]  [id number] [place] [address] 57319-
6153  23 Oct, 2017  Lumbar neuritis M54.16 and ADHD (attention deficit 
hyperactivity disorder), inattentive type F90.0

 

 [facility]  [id number] [place] [address] 78244-
1945  12 Oct, 2017   

 

 [facility]  [id number] [place] [address] 11318-
3799  26 Sep, 2017  Lumbar neuritis M54.16 and ADHD (attention deficit 
hyperactivity disorder), inattentive type F90.0

 

 [facility]  [id number] [place] [address] 90480-
7614  19 Sep, 2017   

 

 [facility]  [id number] [place] [address] 49533-
3860  31 Aug, 2017  ADHD (attention deficit hyperactivity disorder), 
inattentive type F90.0 and Lumbar neuritis M54.16

 

 [facility]  [id number] [place] [address] 09804-
3293  24 Aug, 2017  Lumbar neuritis M54.16

 

 [facility]  [id number] [place] [address] 37638-
8952  23 Aug, 2017   

 

 [facility]  [id number] [place] [address] 67637-
5879  02 Aug, 2017  ADHD (attention deficit hyperactivity disorder), 
inattentive type F90.0 and Lumbar neuritis M54.16

 

 [facility]  [address] [address]-
0303  02 Aug, 2017   

 

 [facility]  [address] [address]-
2050     

 

 [facility]  [address] [address]-
9865     

 

 [facility]  [id number] [place] [address] 34877-
4263     

 

 [facility]  [id number] [place] [address] 63385-
3914     

 

 [facility]  [id number] [place] [address] 49701-
1926    ADHD (attention deficit hyperactivity disorder), 
inattentive type F90.0 and Lumbar neuritis M54.16

 

 [facility]  [id number] [place] [address] 33574-
4605     

 

 [facility]  [address] [address]-
5463     

 

 [facility]  [id number] [place] [address] 37976-
0357    Lumbar neuritis M54.16 and ADHD (attention deficit 
hyperactivity disorder), inattentive type F90.0

 

 [facility]  [address] [address]-
9966     

 

 [facility]  [address] [address]-
6981  10 May, 2017  Lumbar neuritis M54.16 and ADHD (attention deficit 
hyperactivity disorder), inattentive type F90.0

 

 [facility]  [address] [address]-
4826  03 May, 2017   

 

 [facility]  [address] [address]-
5740  01 May, 2017   

 

 [facility]  [address] [place] [address] 18859-
9458    Narcolepsy due to underlying condition without cataplexy 
G47.429 and Lumbago with sciatica, left side M54.42

 

 [facility]  [id number] [place] [address] 97453-
9404  14 2017  Lumbar neuritis M54.16 and ADHD (attention deficit 
hyperactivity disorder), inattentive type F90.0

 

 [facility]  [id number] [place] [address] 10284-
4603  31 Mar, 2017   

 

 [facility]  [id number] [place] [address] 67077-
8134  15 Mar, 2017  Lumbar neuritis M54.16 and ADHD (attention deficit 
hyperactivity disorder), inattentive type F90.0

 

 [facility]  [id number] [place] [address] 17464-
0824  15 Mar, 2017   

 

 [facility]  [id number] [place] [address] 81140-
0390  06 Mar, 2017   

 

 [facility]  [id number] [place] [address] 21258-
9435  15 2017  ADHD (attention deficit hyperactivity disorder), 
inattentive type F90.0

 

 [facility]  [id number] [place] [address] 29833-
6423  15 2017  Lumbar neuritis M54.16

 

 [facility]  [id number] [place] [address] 35456-
6595  08 2017   

 

 [facility]  [id number] [place] [address] 45095-
7137  07 2017   

 

 [facility]  [id number] [place] [address] 77403-
3755    Attention deficit hyperactivity disorder (ADHD), 
predominantly inattentive type F90.0

 

 [facility]  [id number] [place] [address] 73315-
3781     

 

 [facility]  [address] [place] [address]-
8263  10 Niall, 2017   

 

 [facility]  [id number] [place] [address] 09196-
1559     

 

 [facility]  [id number] [place] [address] 94432-
0854  22 Dec, 2016  Attention deficit hyperactivity disorder (ADHD), 
predominantly inattentive type F90.0

 

 [facility]  [id number] [place] [address] 89197-
8481  12 Dec, 2016   

 

 [facility]  [id number] [place] [address] 80363-
1850  07 Dec, 2016   

 

 [facility]  [id number] [place] [address] 96543-
9499  05 Dec, 2016   

 

 [facility]  [id number] [place] [address] 93227-
1865  05 Dec, 2016   

 

 [facility]  [id number] [place] [address] 56127-
2542  05 Dec, 2016  Low TSH level R94.6

 

 [facility]  [id number] [place] [address] 67358-
6138  02 Dec, 2016   

 

 [facility]  [id number] [place] [address] 19662-
7379  10 Nov, 2016   

 

 [facility]  [address] [address]-
1220  10 Nov, 2016   

 

 [facility]  [address] [place] [address] 21447-
7583     

 

 [facility]  [address] [address]-
8227  18 Oct, 2016  Low TSH level R94.6

 

 [facility]  [id number] [place] [address] 54377-
5118  17 Oct, 2016  Excessive daytime sleepiness G47.19 and ADHD (attention 
deficit hyperactivity disorder), inattentive type F90.0

 

 [facility]  [id number] [place] [address]-
6330  13 Oct, 2016   

 

 [facility]  [address] [facility] [id number] [place] [address]-
8355  12 Oct, 2016   

 

 [facility] [place] [facility]  [address] [facility] [id number] [place] [address]-
3411  03 Oct, 2016   

 

 [facility]  [address] [facility] [id number] [place] [address]-
6741  28 Sep, 2016   

 

 [facility] [place] [facility]  [address] [facility] [id number] [place] [address]-
3742  14 Sep, 2016   

 

 [facility]  [address] [facility] [id number] [place] [address], [place] [address]-
8984  01 Sep, 2016   

 

 [facility] [place] [facility]  [id number] [place] [address]-
8500  17 Aug, 2016   

 

 [facility]  [address] [place] [address], [place] [address]-
7404  04 Aug, 2016   

 

 [facility]  [address] [facility] [id number] [place] [address]-
8722  03 Aug, 2016   

 

 [facility]  [address] [place] [address], [place] [address]-
1055    Lumbar neuritis M54.16

 

 [facility]  [address] [address]-
2829     

 

 [facility] [place] [facility]  [address] [place] [address]-
5790     

 

 [facility] [place] [facility]  [address] [facility] [id number] [address]-
7015    Lumbar neuritis M54.16

 

 [facility]  [address] [facility] [id number] [place] [address]-
6433     

 

 [facility] [place] [facility]  [id number] [place] [address]-
3580     

 

 [facility] [place] [facility]  [address] [address]-
2824  26 May, 2016  Lumbar neuritis M54.16

 

 [facility]  [address] [address]-
7179  25 May, 2016   

 

 [facility]  [id number] [address]-
2409  10 May, 2016   

 

 [facility]  [address] [place] [address] 13463-
9132    Lumbar neuritis M54.16

 

 [facility]  [address] [address]-
3977     

 

 [facility]  [id number] [place] [address] 27867-
2440     

 

 [facility]  [address] [place] [address] 93881-
7018  23 Mar, 2016   

 

 [facility]  [id number] [place] [address] 78147-
7553  14 Mar, 2016   

 

 [facility]  [address] [address]-
9973  14 Mar, 2016   

 

 [facility]  [address] [place] [address] 00338-
4966  11 Mar, 2016   

 

 [facility]  [address] [address]-
6199  24 2016   

 

 [facility]  [address] [address]-
7070    Inguinal hernia, right K40.90

 

 [facility]  [address] [address]-
5025  17 2016   

 

 [facility]  [address] [place] [address] 96091-
2086  15 2016  Low back pain M54.5 and Sciatica, unspecified side M54.30

 

 [facility]  [address] [address]-
5441     

 

 [facility] [place] [facility]  [address] [facility] [id number] [place] [address]-
6767     

 

 [facility] [place] [facility]  [address] [facility] [id number] [place] [address], [place] [address]-
1736  30 Dec, 2015   

 

 [facility] [place] [facility]  [address] [facility] [id number] [place] [address]-
2999  28 Dec, 2015   

 

 [facility] [place] [facility]  [address] [facility] [id number] [place] [address], [place] [address]-
0970  02 Dec, 2015   

 

 [facility] [place] [facility]  [address] [facility] [id number] [place] [address], [place] [address]-
2750     

 

 [facility] [place] [facility]  [address] [facility] [id number] [place] [address] 86290-
6428     

 

 [facility]  [id number] [place] [address], [place] [address]-
3663     

 

 [facility] [place] [facility]  [id number] [place] [address] 32809-
3008     

 

 [facility] [place] [facility]  [address] [address]-
7504  26 Oct, 2015   

 

 [facility]  [address] [place] [address] 87711-
4031  22 Oct, 2015  Encounter for immunization Z23

 

 [facility]  [id number] [address]-
6276  07 Oct, 2015   

 

 [facility] [place] [facility]  [address] [address]-
7919  05 Oct, 2015   

 

 [facility] [place] [facility]  [address] [facility] [id number] [address]-
2032  09 Sep, 2015   

 

 [facility] [place] [facility]  [id number] [address]-
0077  08 Sep, 2015   

 

 [facility] [place] [facility]  [address] [address]-
4826  19 Aug, 2015  Lumbago 724.2

 

 [facility] [place] [facility]  [address] [id number] [place] [address]-
6914  12 Aug, 2015   

 

 [facility] [place] [facility]  [address] [id number] [place] [address]-
0759    Lumbago 724.2

 

 [facility] [place] [facility]  [address] [id number] [place] [address]-
1096     

 

 [facility] [place] [facility]  [address] [id number] [place] [address]-
8517     

 

 [facility] [place] [facility]  [address] [id number] [place] [address]-
0031     

 

 [facility] [place] [facility]  [address] [id number] [place] [address]-
6796     

 

 [facility] [place] [facility]  [address] [id number] [place] [address]-
8195     

 

 [facility] [place] [facility]  [address] [id number] [place] [address]-
5871  22 May, 2015   

 

 [facility] [place] [facility]  [address] [id number] [place] [address]-
7697     

 

 [facility] [place] [facility]  [address] [id number] [place] [address]-
8047     

 

 [facility] [place] [facility]  [address] [id number] [place] [address]-
0661  30 Mar, 2015   

 

 [facility] [place] [facility]  [address] [id number] [place] [address]-
6396  30 Mar, 2015   

 

 [facility] [place] [facility]  [address] [id number] [place] [address]-
5914  02 Mar, 2015   

 

 [facility] [place] [facility]  [address] [id number] [place]
4158  02 Mar, 2015   

 

 [facility] [place] [facility]  [address] [id number] [place] [address]-
1635     

 

 [facility] [place] [facility]  [address] [id number] [place] [address]-
9195  ,    

 

 [facility] [place] [facility]  [address] [id number] [place] [address]-
5613     

 

 [facility] [place] [facility]  [address] [id number] [place] [address]-
1186     

 

 [facility] [place] [facility]  [address] [id number] [place] [address]-
4348     

 

 [facility] [place] [facility]  [address] [id number] [place] [address]-
8805     

 

 [facility] [place] [facility]  [address] [id number] [place]
6728  31 Dec, 2014   

 

 [facility] [place] [facility]  [address] [id number] [place] [address]-
7986  31 Dec, 2014   

 

 [facility] [place] [facility]  [address] [id number] [place] [address]-
5189  22 Dec, 2014   

 

 [facility] [place] [facility]  [address] [id number] [place] [address]-
6013  22 Dec, 2014   

 

 [facility] [place] [facility]  [address] [id number] [place] [address]-
1655  08 Dec, 2014   

 

 [facility] [place] [facility]  [address] [facility] [id number] [place] [address]-
3350  08 Dec, 2014   

 

 [facility] [place] [facility]  [address] [id number] [place] [address]-
8641  10 Nov, 2014   

 

 [facility] [place] [facility]  [address] [id number] [place] [address]-
2088  10 Nov, 2014   

 

 [facility] [place] [facility]  [address] [id number] [place] [address]-
6781  13 Oct, 2014   

 

 [facility] [place] [facility]  [address] [id number] [place]
2670  13 Oct, 2014   

 

 [facility] [place] [facility]  [address] [id number] [place] [address]-
9828  01 Oct, 2014   

 

 [facility] [place] [facility]  [address] [id number] [place] [address]-
5223  01 Oct, 2014   

 

 [facility] [place] [facility]  [address] [id number] [place]
1339  15 Sep, 2014   

 

 [facility] [place] [facility]  [address] [id number] [place] [address]-
5257  15 Sep, 2014   

 

 [facility] [place] [facility]  [address] [id number] [place] [address]-
4156  18 Aug, 2014   

 

 [facility] [place] [facility]  [address] [id number] [place] [address]-
8415  18 Aug, 2014   

 

 [facility] [place] [facility]  [address] [id number] [place] [address]-
7742  18 Aug, 2014   

 

 [facility] [place] [facility]  [address] [id number] [place] [address]-
7815  18 Aug, 2014   

 

 [facility] [place] [facility]  [address] [id number] [place] [address]-
5754     

 

 [facility] [place] [facility]  [address] [id number] [place] [address]-
6342     

 

 [facility] [place] [facility]  [address] [id number] [place] [address]-
6004     

 

 [facility] [place] [facility]  [address] [id number] [place] [address]-
6462     

 

 [facility] [place] [facility]  [address] [id number] [place] [address]-
4549     

 

 [facility] [place] [facility]  [address] [id number] [place] [address]-
0448     

 

 [facility] [place] [facility]  [address] [id number] [place] [address]-
9471  30 May, 2014   

 

 [facility] [place] [facility]  [address] [id number] [place] [address]-
7987  28 May, 2014   

 

 [facility] [place] [facility]  [address] [id number] [place] [address]-
1334  28 May, 2014   

 

 [facility] [place] [facility]  [address] [id number] [place] [address]-
7892     

 

 [facility] [place] [facility]  [address] [id number] [place] [address]-
1526     

 

 [facility] [place] [facility]  [address] [id number] [place] [address]-
5088     

 

 [facility] [place] [facility]  [address] [id number] [place] [address]-
6080     

 

 [facility] [place] [facility]  [address] [id number] [place] [address]-
2233     

 

 [facility] [place] [facility]  [address] [id number] [place] [address]-
3391     

 

 [facility] [place] [facility]  [address] [id number] [place] [address]-
3411     

 

 [facility] [place] [facility]  [address] [id number] [place] [address]-
3165     

 

 [facility] [place] [facility]  [address] [id number] [place] [address]-
8789     

 

 [facility] [place] [facility]  [address] [id number] [place] [address]-
9012     

 

 [facility] [place] [facility]  [address] [id number] [place] [address]-
[address]     

 

 [facility] [place] [facility]  [address] [id number] [place] [address]-
[address]  28 Mar, 2014   

 

 [facility] [place] [facility]  [address] [id number] [place] [address]-
[address]  27 Mar, 2014   

 

 [facility] [place] [facility]  [address] [id number] [place] [address]-
[address]  27 Mar, 2014   

 

 [facility] [place] [facility]  [address] [id number] [place] [address]-
[address]  26 Mar, 2014   

 

 [facility] [place] [facility]  [address] [id number] [place] [address]-
[address]  26 Mar, 2014   

 

 [facility] [place] [facility]  [address] [id number] [place] [address]     

 

 [facility] [place] [facility]  [address] [id number] [place] [address]-
[address]6     

 

 [facility] [place] [facility]  [address] [id number] [place] [address]-
0836     

 

 [facility] [place] [facility]  [address] [id number] [place] [address]-
[address]     

 

 [facility] [place] [facility]  [address] [id number] [place] [address]-
68[address]     

 

 [facility] [place] [facility]  [address] [id number] [place] [address]-
6992     

 

 [facility] [place] [facility]  [address] [id number] [place] [address]-
5374     

 

 [facility] [place] [facility]  [address] [id number] [place] [address]-
5697     

 

 [facility] [place] [facility]  [address] [facility] [id number] [place] [address]-
9364     

 

 [facility] [place] [facility]  [address] [id number] [place] [address]-
1593     

 

 [facility] [place] [facility]  [address] [id number] [place] [address]-
8093     

 

 [facility] [place] [facility]  [address] [id number] [place] [address]-
2847     

 

 [facility] [place] [facility]  [address] [id number] [place] [address]-
5413     

 

 [facility] [place] [facility]  [address] [id number] [place] [address]-
9016  10 Dec, 2013   

 

 [facility] [place] [facility]  [address] [id number] [place] [address]-
2546  10 Dec, 2013   

 

 [facility]  [address] [facility] [id number] [place] [address]-
2546     

 

 [facility]  [address] [facility] [id number] [place] [address]-
2806     







IMMUNIZATIONS

No Known Immunizations



SOCIAL HISTORY

Never Assessed



REASON FOR VISIT

Controlled Med Refill 



PLAN OF CARE





VITAL SIGNS





MEDICATIONS







 Medication  Instructions  Dosage  Frequency  Start Date  End Date  Duration  
Status

 

 Norco  MG  Orally every 6 hrs  1 tablet as needed  6h       
28 days  Active

 

 Xanax 1 MG  Orally 3 times a day  1 tablet  8h  24 Aug, 2017     28 days  
Active







RESULTS

No Results



PROCEDURES

No Known procedures



INSTRUCTIONS





MEDICATIONS ADMINISTERED

No Known Medications



MEDICAL (GENERAL) HISTORY







 Type  Description  Date

 

 Medical History  narcolepsy   

 

 Surgical History  Gallbladder removed  2015

 

 Surgical History  Hernia repair  2016

## 2018-09-02 NOTE — ED GENERAL
General


Chief Complaint:  General Problems/Pain


Stated Complaint:  GROIN PAIN


Nursing Triage Note:  


RIGHT GROIN PAIN POST RIGHT HERNIA REPAIR IN 2016. PAIN HAS BEEN ALL DAY TODAY 


AND CURRENT PRESCRIBED MEDICATIONS ARE NOT RELIEVING THE PAIN. TAKING 

GABEPENTIN 


AND TYLENOL. APT TO SEE  ISN'T UNTIL NEXT WEEK.


Nursing Sepsis Screen:  No Definite Risk


Source of Information:  Patient


Exam Limitations:  No Limitations





History of Present Illness


Date Seen by Provider:  Sep 2, 2018


Time Seen by Provider:  14:00


Initial Comments


Patient is 63-year-old female who presents to the emergency room with 

complaints of right groin pain for 6 months. She reports that back in 2016 she 

had a hernia repair to the right groin area. She states that she has had this 

pain for the past 6 months but has became worse today. She reports taking 

gabapentin and Tylenol without relief. She has an appointment next week to see 

Rk Jennings at [facility].


Timing/Duration:  Other (6 months)


Associated Systoms:  Denies Symptoms





Allergies and Home Medications


Allergies


Coded Allergies:  


     Penicillins (Verified  Allergy, Unknown, 07)





Home Medications


Alprazolam 1 Mg Tablet, 1 MG PO TID, (Reported)


Docusate Sodium 100 Mg Capsule, 100 MG PO BID


   Prescribed by: KRISTINA BOYLE on 3/24/16 1249


Hydrocodone Bit/Acetaminophen 1 Each Tablet, 1 TAB PO Q4H PRN


   Prescribed by: KRISTINA BOYLE on 3/24/16 1249


Hydrocodone Bit/Acetaminophen 1 Tab Tab, 1 EACH PO Q6H PRN for PAIN


   Prescribed by: JENNYFER ANTHONY on 18 1602





Patient Home Medication List


Home Medication List Reviewed:  Yes





Review of Systems


Review of Systems


Constitutional:  see HPI; No chills, No fever


Gastrointestinal:  see HPI, other (right groin pain. )





All Other Systems Reviewed


Negative Unless Noted:  Yes





Past Medical-Social-Family Hx


Patient Social History


Alcohol Use:  Denies Use


Recreational Drug Use:  Yes (SMOKES 1/2 PPD)


Smoking Status:  Current Everyday Smoker


Type Used:  Cigarettes


Recent Foreign Travel:  No


Contact w/Someone Who Travel:  No


Recent Infectious Disease Expo:  No


Recent Hopitalizations:  No





Immunizations Up To Date


Tetanus Booster (TDap):  More than 5yrs


Date of Pneumonia Vaccine:  2009





Seasonal Allergies


Seasonal Allergies:  No





Past Medical History


Surgeries:  Yes (T)


Abdominal, Adenoidectomy, Gallbladder, Hysterectomy, Tonsillectomy


Respiratory:  Yes (tobaccoism)


Pneumonia


Cardiac:  No


Neurological:  No


Pregnant:  No


Reproductive Disorders:  No


Female Reproductive Disorders:  Denies


GYN History:  Hysterectomy


Sexually Transmitted Disease:  No


HIV/AIDS:  No


Gastrointestinal:  Yes


Chronic Diarrhea


Musculoskeletal:  Yes (DISC PROBLEMS)


Degenerate Disk Disease, Chronic Back Pain


Endocrine:  No


Loss of Vision:  Bilateral


Hearing Impairment:  Denies


Cancer:  No


Psychosocial:  Yes


Anxiety


Integumentary:  No


Blood Disorders:  No


Adverse Reaction/Blood Tranf:  No





Physical Exam


Vital Signs





Vital Signs - First Documented








 18





 13:20 16:17


 


Temp 98.2 


 


Pulse 85 


 


Resp 20 


 


B/P (MAP) 119/67 (84) 


 


Pulse Ox  98


 


O2 Delivery Room Air 





Capillary Refill : Less Than 3 Seconds


Height, Weight, BMI


Height: 4'11.00"


Weight: 130lbs. 9.0oz. 58.251343th; 20.30 BMI


Method:Stated


General Appearance:  No Apparent Distress, WD/WN


Respiratory:  Chest Non Tender, Lungs Clear, Normal Breath Sounds, No Accessory 

Muscle Use, No Respiratory Distress


Cardiovascular:  Regular Rate, Rhythm, No Edema, No Gallop, No JVD, No Murmur, 

Normal Peripheral Pulses


Gastrointestinal:  Normal Bowel Sounds, No Organomegaly, No Pulsatile Mass, Non 

Tender, Soft, Tenderness (tinderness to her right groin area at scar of 

previous hernia repair site. No bulging or lumps noted on exam. )


Neurologic/Psychiatric:  Alert, Oriented x3, No Motor/Sensory Deficits


Skin:  Normal Color, Warm/Dry





Progress/Results/Core Measures


Suspected Sepsis


Recent Fever Within 48 Hours:  No


Infection Criteria Present:  None


New/Unexplained  Altered Menta:  No


Sepsis Screen:  No Definite Risk


SIRS


Temperature:98.2 


Pulse: 85 


Respiratory Rate: 20


 


Blood Pressure 119 /67 


Mean: 84


 








Results/Orders


Lab Results





My Orders





Medications Given in ED





Vital Signs/I&O


Capillary Refill : Less Than 3 Seconds








Blood Pressure Mean:  84








Progress Note :  


   Time:  16:00


Progress Note


I have seen and evaluated the patient. I have informed her of her laboratory 

and imaging studies. She reports that she has an appointment with her doctor 

next week for a check up. She agrees with plans for discharge and return 

precautions were given.





Diagnostic Imaging





   Diagonstic Imaging:  CT


   Plain Films/CT/US/NM/MRI:  abdomen, pelvis


Comments





NAME:      MARIA INES DEE REC#:   [id number]


ACCOUNT#:   [id number]


PHYSICIAN:    JENNYFER ANTHONY


 








CC:   JENNYFER ANTHONY; RON FRIEDMAN MD


 Page 2 of 2


 RADIOLOGY REPORT





 [facility].


 [place], [place]





CC:   JENNYFER ANTHONY; RON FRIEDMAN MD


 Page 1 of 1


 RADIOLOGY REPORT





NAME:      MARIA INES DEE REC#:   [id number]


ACCOUNT#:   [id number]


PT STATUS:   DEP ER


:      1955


PHYSICIAN:    JENNYFER ANTHONY


ADMIT DATE:   18/ER


 ***Signed***


Date of Exam:   18





CT ABDOMEN/PELVIS W


 





PROCEDURE: CT abdomen and pelvis with contrast.





TECHNIQUE: Multiple contiguous axial images were obtained through


the abdomen and pelvis after administration of intravenous


contrast. 





INDICATION: Right lower quadrant pain.





FINDINGS: The previous CT abdomen/pelvis exam of 2016 failed


to show any sign of an acute abnormality of the abdomen or


pelvis. In the interval since the prior study, several fluid


filled segments of small bowel have developed in the left


midabdomen. There does seem to be generalized thickening of the


wall of these segments of the small bowel. The thickened


appearance of the wall could be related to incomplete distention.


The possibility that there is an element of enteritis present


should certainly be considered, however. There is no sign of a


small bowel obstruction.





The appendix was visualized and is not abnormally thickened.





There is no pelvic mass or free fluid collection evident. The


uterus is surgically absent. The urinary bladder is grossly


unremarkable.





The liver is of lower density than usually seen and this does


suggest fatty metamorphosis. The spleen, pancreas, adrenals,


kidneys, aorta and inferior vena cava are unremarkable for an


acute abnormality. As noted previously, the gallbladder is


surgically absent. The stomach is partially filled with fluid and


consequently difficult to assess. 





The lung bases are clear. 





The bone windows show no evidence for a fracture or for a


destructive lesion.





IMPRESSION:


1. The fluid-filled segments of small bowel in the left


midabdomen are nonspecific but may be secondary to enteritis.


Clinical follow up is recommended.


2. There is no acute abnormality in the abdomen or pelvis noted


otherwise. In particular, there is no sign of appendicitis.


3. The gallbladder and uterus are surgically absent. 





Dictated by: 





  Dictated on workstation # [id number]





[id number]





Dict:      18 1516


Trans:      18 1744





Interpreted by:         RON FRIEDMAN MD


Electronically signed by:   RON FRIEDMAN 





Departure


Impression





 Primary Impression:  


 Groin pain, chronic, right


Disposition:  01 HOME, SELF-CARE


Condition:  Stable/Unchanged





Departure-Patient Inst.


Decision time for Depature:  16:01


Referrals:  


NAHUN JENNINGS (PCP/Family)


Primary Care Physician


Patient Instructions:  CHRONIC PAIN





Add. Discharge Instructions:  


Follow-up with your primary care provider within 1 week for recheck. Take 

medications as directed. Return back to the emergency room for any worsening 

symptoms or concerns as needed. All discharge instructions reviewed with 

patient and/or family. Voiced understanding.


Scripts


Hydrocodone Bit/Acetaminophen (Hydrocodone/Acetaminophen 5/325mg Tablet) 1 Tab 

Tab


1 EACH PO Q6H PRN for PAIN, #10 TAB


   Prov: JENNYFER ANTHONY         18











JENNYFER ANTHONY Sep 2, 2018 15:01

## 2018-09-02 NOTE — XMS REPORT
[facility]

 Created on: 2018



Sofiya Singh

External Reference #: [id number]

: 1955

Sex: Female



Demographics







 Address  [address] [place]  [address]

 

 Preferred Language  Unknown

 

 Marital Status  Unknown

 

 [employer] Affiliation  Unknown

 

 Race  Unknown

 

 Ethnic Group  Unknown





Author







 Author  NAHUN SNYDER

 

 Organization  [facility]

 

 Address  [address] [place]  [address]



 

 Phone  [phone]







Care Team Providers







 Care Team Member Name  Role  Phone

 

 NAHUN SNYDER  Unavailable  [phone]







PROBLEMS







 Type  Condition  ICD9-CM Code  XIX08-IP Code  Onset Dates  Condition Status  
SNOMED Code

 

 Problem  ADHD (attention deficit hyperactivity disorder), inattentive type     
F90.0     Active  26511209

 

 Problem  Arthritis     M19.90     Active  1795163

 

 Problem  Positive skin test for tuberculosis     R76.11     Active  962315077

 

 Problem  Lumbago with sciatica, left side     M54.42     Active  628487497

 

 Problem  Excessive daytime sleepiness     G47.19     Active  602136861378

 

 Problem  Primary narcolepsy without cataplexy     G47.419     Active  
[id number]

 

 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 
    Active  [id number]







ALLERGIES

No Information



ENCOUNTERS







 Encounter  Location  Date  Diagnosis

 

 [place] [address] [address]-
1310  14 Mar, 2018  ADHD (attention deficit hyperactivity disorder), 
inattentive type F90.0 ; Lumbago with sciatica, left side M54.42 and Arthritis 
M19.90

 

 [place] [address] [place] [id number] [place] [address] 18846-
8611    ADHD (attention deficit hyperactivity disorder), 
inattentive type F90.0 and Lumbar neuritis M54.16

 

 [place] [address] [place] [id number] [place] [address] 73392-
7506     

 

 [place] [address] [address]-
5753  16 2018  Lumbar neuritis M54.16

 

 [place] [address] [place] [id number] [place] [address] 40604-
2531    Low back pain M54.5

 

 [facility]  [address] [place] [address] 14182-
7144    ADHD (attention deficit hyperactivity disorder), 
inattentive type F90.0

 

 [facility]  [address] [place] [id number] [place] [address] 47003-
6081    Positive skin test for tuberculosis R76.11

 

 [facility]  [id number] [place] [address] 44442-
8421    Positive skin test for tuberculosis R76.11

 

 [facility]  [id number] [place] [address] 44091-
1648     

 

 [facility]  [address] [place] [id number] [place] [address] 93446-
1744    Lumbar neuritis M54.16

 

 [facility]  [address] [place] [id number] [place] [address] 84639-
8820    ADHD (attention deficit hyperactivity disorder), 
inattentive type F90.0

 

 [facility]  [address] [place] [id number] [place] [address] 31366-
2776     

 

 [facility]  [address] [id number] [place] [address] 11699-
1852  20 Dec, 2017  Lumbar neuritis M54.16

 

 [facility]  [address] [place] [id number] [place] [address] 20236-
7185  15 Dec, 2017  ADHD (attention deficit hyperactivity disorder), 
inattentive type F90.0

 

 [facility]  [address] [place] [address] 94856-
1355  12 Dec, 2017   

 

 [facility]  [address] [id number] [place] [address] 49486-
1595  11 Dec, 2017   

 

 [facility]  [address] [place] [id number] [place] [address] 82197-
6143    Lumbar neuritis M54.16

 

 [facility]  [address] [place] [id number] [place] [address] 47670-
0439    ADHD (attention deficit hyperactivity disorder), 
inattentive type F90.0 and Primary narcolepsy without cataplexy G47.419

 

 [facility]  [address] [id number] [place] [address] 48238-
8808  10 Nov, 2017   

 

 [facility]  [address] [id number] [place] [address] 96912-
4164     

 

 [facility]  [address] [id number] [place] [address] 34974-
6431  23 Oct, 2017  Lumbar neuritis M54.16 and ADHD (attention deficit 
hyperactivity disorder), inattentive type F90.0

 

 [facility]  [address] [id number] [place] [address] 86674-
8785  12 Oct, 2017   

 

 [facility]  [address] [place] [id number] [place] [address] 28940-
1881  26 Sep, 2017  Lumbar neuritis M54.16 and ADHD (attention deficit 
hyperactivity disorder), inattentive type F90.0

 

 [facility]  [address] [id number] [place] [address] 52278-
8513  19 Sep, 2017   

 

 [facility]  [address] [place] [id number] [place] [address] 25673-
1767  31 Aug, 2017  ADHD (attention deficit hyperactivity disorder), 
inattentive type F90.0 and Lumbar neuritis M54.16

 

 [facility]  [address] [place] [id number] [place] [address] 35673-
8592  24 Aug, 2017  Lumbar neuritis M54.16

 

 [facility]  [address] [place] [id number] [place] [address] 78313-
5489  23 Aug, 2017   

 

 [facility]  [address] [place] [id number] [place] [address] 30608-
0876  02 Aug, 2017  ADHD (attention deficit hyperactivity disorder), 
inattentive type F90.0 and Lumbar neuritis M54.16

 

 [facility]  [address] [place] [id number] [place] [address] 89549-
5713  02 Aug, 2017   

 

 [facility]  [address] [place] [address] [address]-
5035     

 

 [facility]  [address] [address]-
3627     

 

 [facility]  [address] [address]-
3582     

 

 [facility]  [address] [place] [id number] [place] [address] 34581-
1617     

 

 [facility]  [address] [place] [id number] [place] [address] 81968-
5120    ADHD (attention deficit hyperactivity disorder), 
inattentive type F90.0 and Lumbar neuritis M54.16

 

 [facility]  [address] [address]-
5744     

 

 [facility]  [address] [place] [id number] [place] [address] 80962-
4680     

 

 [facility]  [address] [place] [address] 34294-
3723    Lumbar neuritis M54.16 and ADHD (attention deficit 
hyperactivity disorder), inattentive type F90.0

 

 [facility]  [address] [address]-
4777     

 

 [facility]  [address] [address]-
9093  10 May, 2017  Lumbar neuritis M54.16 and ADHD (attention deficit 
hyperactivity disorder), inattentive type F90.0

 

 [facility]  [address] [address]-
4188  03 May, 2017   

 

 [facility]  [address] [address]-
7653  01 May, 2017   

 

 [facility]  [address] [address]-
4795    Narcolepsy due to underlying condition without cataplexy 
G47.429 and Lumbago with sciatica, left side M54.42

 

 [facility] [place] [facility]  [address], [place] [address]-
5922  14 2017  Lumbar neuritis M54.16 and ADHD (attention deficit 
hyperactivity disorder), inattentive type F90.0

 

 [facility]  [address] [place] [id number] [address]-
2456  31 Mar, 2017   

 

 [facility]  [address] [place] [id number] [place] [address] 31070-
4864  15 Mar, 2017  Lumbar neuritis M54.16 and ADHD (attention deficit 
hyperactivity disorder), inattentive type F90.0

 

 [facility]  [address] [place] [id number] [place] [address] 79038-
0181  15 Mar, 2017   

 

 [facility]  [address] [id number] [place] [address] 67934-
6756  06 Mar, 2017   

 

 [facility]  [address] [place] [id number] [place] [address] 75736-
6047  15 2017  ADHD (attention deficit hyperactivity disorder), 
inattentive type F90.0

 

 [facility]  [address] [place] [address] 45364-
4306  15 2017  Lumbar neuritis M54.16

 

 [facility]  [address] [id number] [address]-
1041  08 2017   

 

 [facility]  [address] [place] [id number] [place] [address] 65824-
9641     

 

 [facility]  [address] [place] [id number] [place] [address] 51863-
9856    Attention deficit hyperactivity disorder (ADHD), 
predominantly inattentive type F90.0

 

 [facility]  [address] [id number] [place] [address] 34145-
8779     

 

 [facility]  [address] [address]-
9864  10 Niall, 2017   

 

 [facility]  [address] [place] [id number] [place] [address] 73415-
2888     

 

 [facility]  [address] [place] [address] 27552-
8948  22 Dec, 2016  Attention deficit hyperactivity disorder (ADHD), 
predominantly inattentive type F90.0

 

 [facility]  [address] [place] [id number] [place] [address] 90092-
9778  12 Dec, 2016   

 

 [facility]  [address] [place] [id number] [place] [address] 91859-
4910  07 Dec, 2016   

 

 [facility]  [address] [place] [id number] [place] [address] 04784-
9315  05 Dec, 2016   

 

 [facility]  [address] [place] [id number] [place] [address] 08821-
3824  05 Dec, 2016  Low TSH level R94.6

 

 [facility]  [address] [place] [id number] [place] [address] 22802-
9861  05 Dec, 2016   

 

 [facility]  [address] [place] [id number] [place] [address] 74510-
7324  02 Dec, 2016   

 

 [facility]  [address] [id number] [place] [address] 92870-
4070  10 Nov, 2016   

 

 [facility]  [address] [id number] [place] [address] 54877-
6661  10 Nov, 2016   

 

 [facility]  [address] [place] [id number] [place] [address] 00765-
8421     

 

 [facility]  [address] [place] [id number] [place] [address] 68608-
1884  18 Oct, 2016  Low TSH level R94.6

 

 [facility]  [address] [id number] [place] [address] 47608-
5599  17 Oct, 2016  Excessive daytime sleepiness G47.19 and ADHD (attention 
deficit hyperactivity disorder), inattentive type F90.0

 

 [facility]  [address] [place] [address] 42112-
2658  13 Oct, 2016   

 

 [facility]  [address] [place] [id number] [place] [address] 49751-
2773  12 Oct, 2016   

 

 [facility]  [address] [facility] [id number] [place] [address]-
2295  03 Oct, 2016   

 

 [facility]  [address] [facility] [id number] [place] [address], [place] [address]-
9635  28 Sep, 2016   

 

 [facility]  [address] [facility] [id number] [place] [address]-
8585  14 Sep, 2016   

 

 [facility]  [address] [facility] [id number] [place] [address], [place] [address]-
3786  01 Sep, 2016   

 

 [facility]  [address] [facility] [id number] [place] [address], [place] [address]-
7807  17 Aug, 2016   

 

 [facility]  [address] [facility] [id number] [place] [address], [place] [address]-
5431  04 Aug, 2016   

 

 [facility]  [address] [facility] [id number] [place] [address], [place] [address]-
6100  03 Aug, 2016   

 

 [facility]  [address] [place] [id number] [place] [address] 73819-
1784    Lumbar neuritis M54.16

 

 [facility]  [address] [place] [id number] [address]-
5859     

 

 [facility]  [address] [place] [id number] [address]-
0521     

 

 [facility]  [address], [place] [address]-
4672    Lumbar neuritis M54.16

 

 [facility]  [address] [facility] [id number] [address]-
4911     

 

 [facility]  [address] [place] [id number] [address]-
7526     

 

 [facility] [place] [facility]  [address] [facility] [id number][place] [address]-
1217  26 May, 2016  Lumbar neuritis M54.16

 

 [facility]  [address] [place] [id number] [address]-
7297  25 May, 2016   

 

 [facility]  [address] [place] [address] 83311-
5596  10 May, 2016   

 

 [facility]  [address] [place] [id number] [place] [address] 76186-
7347    Lumbar neuritis M54.16

 

 [facility]  [address] [place] [id number] [place] [address] 77090-
7664     

 

 [facility]  [address] [place] [id number] [place] [address] 10807-
4499     

 

 [facility]  [address] [place] [id number] [place] [address] 41825-
3417  23 Mar, 2016   

 

 [facility]  [address] [place] [id number] [place] [address] 27921-
8495  14 Mar, 2016   

 

 [facility]  [address] [id number] [place] [address] 41110-
6366  14 Mar, 2016   

 

 [facility]  [address] [id number] [place] [address] 30971-
0822  11 Mar, 2016   

 

 [facility]  [address] [id number] [place] [address] 72447-
3323  24 2016   

 

 [facility]  [address] [id number] [address]-
6061    Inguinal hernia, right K40.90

 

 [facility]  [address] [id number] [place] [address] 38358-
6858     

 

 [facility]  [address] [place] [address] 62529-
3439  15 2016  Low back pain M54.5 and Sciatica, unspecified side M54.30

 

 [facility]  [address] [id number] [address]-
8162     

 

 [facility]  [address] [id number] [place] [address] 72657-
4643     

 

 [facility]  [address] [facility] [id number] [place] [address]-
1339  30 Dec, 2015   

 

 [facility]  [address] [place] [id number] [place] [address], [place] [address]-
3526  28 Dec, 2015   

 

 [facility] [place] [facility]  [address] [facility] [id number] [place] [address]-
7353  02 Dec, 2015   

 

 [facility] [place] [facility]  [address] [facility] [id number] [place] [address], [place] [address]-
1712     

 

 [facility] [place] [facility]  [address] [facility] [id number] [place] [address]-
0878     

 

 [facility] [place] [facility]  [address] [place] [id number] [place] [address], [place] [address]-
3025     

 

 [facility] [place] [facility]  [address] [place] [id number] [place] [address], [place] [address]-
3497     

 

 [facility] [place] [facility]  [address] [place] [id number] [place] [address], [place] [address]-
4089  26 Oct, 2015   

 

 [facility]  [address] [place] [id number] [place] [address] 67122-
3782  22 Oct, 2015  Encounter for immunization Z23

 

 [facility]  [address] [place] [address] 07430-
2534  07 Oct, 2015   

 

 [facility]  [address] [address]-
2227  05 Oct, 2015   

 

 [facility] [place] [facility]  [address] [address]-
4305  09 Sep, 2015   

 

 [facility] [place] [facility]  [address] [place] [id number] [address]-
0610  08 Sep, 2015   

 

 [facility] [place] [facility]  [address] [place] [id number] [address]-
5079  19 Aug, 2015  Lumbago 724.2

 

 [facility] [place] [facility]  [address] [place] [address]-
8807  12 Aug, 2015   

 

 [facility] [place] [facility]  [address] [id number] [address]-
7392    Lumbago 724.2

 

 [facility] [place] [facility]  [address] [id number] [place] [address]-
8248  17 2015   

 

 [facility] [place] [facility]  [address] [id number] [place] [address]-
3279     

 

 [facility] [place] [facility]  [address] [id number] [place] [address]-
8281     

 

 [facility] [place] [facility]  [address] [id number] [place] [address]-
3357     

 

 [facility] [place] [facility]  [address] [id number] [place] [address]-
1958     

 

 [facility] [place] [facility]  [address] [facility] [id number] [place] [address]-
2667  22 May, 2015   

 

 [facility] [place] [facility]  [address] [facility] [id number] [place] [address]-
4632     

 

 [facility] [place] [facility]  [address] [facility] [id number] [place] [address]-
6591     

 

 [facility] [place] [facility]  [address] [facility] [id number] [place] [address]-
2364  30 Mar, 2015   

 

 [facility] [place] [facility]  [address] [facility] [id number] [place] [address]-
9862  30 Mar, 2015   

 

 [facility] [place] [facility]  [address] [facility] [id number] [place] [address]-
2546  02 Mar, 2015   

 

 [facility] [place] [facility]  [address] [facility] [id number] [place] [address]-
1896  02 Mar, 2015   

 

 [facility] [place] [facility]  [address] [facility] [id number] [place] [address]-
5116     

 

 [facility] [place] [facility]  [address] [facility] [id number] [place] [address]-
2546     

 

 [facility] [place] [facility]  [address] [facility] [id number] [place] [address]-
5930     

 

 [facility] [place] [facility]  [address] [id number] [place] [address]-
5402     

 

 [facility] [place] [facility]  [address] [id number] [place] [address]-
9810     

 

 [facility] [place] [facility]  [address] [facility] [id number] [place] [address]-
5560     

 

 [facility] [place] [facility]  [address] [id number] [place] [address]-
7159  31 Dec, 2014   

 

 [facility] [place] [facility]  [address] [id number] [place]
6564  31 Dec, 2014   

 

 [facility] [place] [facility]  [address] [facility] [id number] [place] [address]-
0666  22 Dec, 2014   

 

 [facility] [place] [facility]  [address] [facility] [id number] [place] [address]-
4814  22 Dec, 2014   

 

 [facility] [place] [facility]  [address] [id number] [place] [address]-
3420  08 Dec, 2014   

 

 [facility] [place] [facility]  [address] [facility] [id number] [place] [address]-
9609  08 Dec, 2014   

 

 [facility] [place] [facility]  [address] [facility] [id number] [place] [address]-
6987  10 Nov, 2014   

 

 [facility] [place] [facility]  [address] [facility] [id number] [address]-
9822  10 Nov, 2014   

 

 [facility] [place] [facility]  [address] [id number] [address]-
0592  13 Oct, 2014   

 

 [facility] [place] [facility]  [address] [id number] [place] [address]-
3877  13 Oct, 2014   

 

 [facility] [place] [facility]  [address] [facility] [id number][place] [address]-
0770  01 Oct, 2014   

 

 [facility] [place] [facility]  [address] [facility] [id number] [address]-
7811  01 Oct, 2014   

 

 [facility] [place] [facility]  [address] [id number] [place] [address]-
8990  15 Sep, 2014   

 

 [facility]  [address] [id number] [place] [address]-
9469  15 Sep, 2014   

 

 [facility] [place] [facility]  [address] [id number] [place] [address]-
7852  18 Aug, 2014   

 

 [facility] [place] [facility]  [address] [id number] [place] [address]-
0650  18 Aug, 2014   

 

 [facility] [place] [facility]  [address] [id number] [place] [address]-
1880  18 Aug, 2014   

 

 [facility] [place] [facility]  [address] [id number] [place] [address]-
7945  18 Aug, 2014   

 

 [facility] [place] [facility]  [address] [id number] [place] [address]-
0115     

 

 [facility] [place] [facility]  [address] [id number] [place] [address]-
8710     

 

 [facility]  [address] [id number] [place] [address]-
5858     

 

 [facility] [place] [facility]  [address] [id number] [place] [address]-
4568     

 

 [facility]  [address] [id number] [place] [address]-
2317     

 

 [facility] [place] [facility]  [address] [id number] [place] [address]-
7113     

 

 [facility] [place] [facility]  [address] [id number] [place] [address]-
8523  30 May, 2014   

 

 [facility] [place] [facility]  [address] [id number] [place] [address]-
4649  28 May, 2014   

 

 [facility] [place] [facility]  [address] [id number] [place] [address]-
1869  28 May, 2014   

 

 [facility] [place] [facility]  [address] [id number] [place] [address]-
3740     

 

 [facility] [place] [facility]  [address] [id number] [place] [address]-
9703  30 2014   

 

 [facility] [place] [facility]  [address] [id number] [place] [address]-
0078     

 

 [facility] [place] [facility]  [address] [id number] [place] [address]-
98[address]     

 

 [facility] [place] [facility]  [address] [id number] [place] [address]-
3073     

 

 [facility] [place] [facility]  [address] [id number] [place] [address]-
2699     

 

 [facility] [place] [facility]  [address] [id number] [place] [address]-
4254     

 

 [facility] [place] [facility]  [address] [id number] [place] [address]-
4585     

 

 [facility] [place] [facility]  [address] [id number] [place] [address]-
7707     

 

 [facility] [place] [facility]  [address] [id number] [place] [address]-
3006     

 

 [facility] [place] [facility]  [address] [id number] [place] [address]-
2398     

 

 [facility] [place] [facility]  [address] [id number] [place] [address]-
5147  28 Mar, 2014   

 

 [facility] [place] [facility]  [address] [id number] [place] [address]-
[address]841  27 Mar, 2014   

 

 [facility] [place] [facility]  [address] [id number] [place] [address]-
8315  27 Mar, 2014   

 

 [facility] [place] [facility]  [address] [id number] [place] [address]-
1958  26 Mar, 2014   

 

 [facility] [place] [facility]  [address] [id number] [place] [address]-
0975  26 Mar, 2014   

 

 [facility] [place] [facility]  [address] [id number] [place] [address]-
6305     

 

 [facility] [place] [facility]  [address] [id number] [place] [address]     

 

 [facility] [place] [facility]  [address] [id number] [place] [address]-
[address]     

 

 [facility] [place] [facility]  [address] [id number] [place] [address]-
[address]     

 

 [facility] [place] [facility]  [address] [id number] [place] [address]-
8501     

 

 [facility] [place] [facility]  [address] [id number] [place] [address]-
[address]     

 

 [facility] [place] [facility]  [address] [id number] [place] [address]-
2976     

 

 [facility] [place] [facility]  [address] [id number] [place] [address]-
[address]     

 

 [facility] [place] [facility]  [address] [id number] [place] [address]-
6075     

 

 [facility] [place] [facility]  [address] [id number] [place] [address]-
[address]     

 

 [facility] [place] [facility]  [address] [id number] [place] [address]-
3628     

 

 [facility] [place] [facility]  [address] [id number] [place] [address]-
5786     

 

 [facility] [place] [facility]  [address] [id number] [place] [address]-
9561     

 

 [facility] [place] [facility]  [address] [id number] [place] [address]-
8257  10 Dec, 2013   

 

 [facility] [place] [facility]  [address] [id number] [place] [address]-
7282  10 Dec, 2013   

 

 [facility] [place] [facility]  [address] [id number] [place] [address]-
0974     

 

 [facility]  [address] [facility] [id number] [place] [address]-
5824     







IMMUNIZATIONS

No Known Immunizations



SOCIAL HISTORY

Never Assessed



REASON FOR VISIT

Controlled Med Refill 17



PLAN OF CARE





VITAL SIGNS





MEDICATIONS







 Medication  Instructions  Dosage  Frequency  Start Date  End Date  Duration  
Status

 

 Alprazolam 1 MG  Orally Three times a day  1 tablet  8h  30 Mar, 2015     28 
days  Active

 

 Hydrocodone-Acetaminophen  MG  Orally every 6 hrs  1 tablet as needed  
6h       28 days  Active







RESULTS

No Results



PROCEDURES

No Known procedures



INSTRUCTIONS





MEDICATIONS ADMINISTERED

No Known Medications



MEDICAL (GENERAL) HISTORY







 Type  Description  Date

 

 Medical History  narcolepsy   

 

 Surgical History  Gallbladder removed  2015

 

 Surgical History  Hernia repair  2016

## 2018-09-02 NOTE — XMS REPORT
[facility]

 Created on: 2015



Sofiya Singh

External Reference #: [id number]

: 1955

Sex: Female



Demographics







 Address  [address] [place]  [address]

 

 Home Phone  [phone]

 

 Preferred Language  Unknown

 

 Marital Status  Unknown

 

 [employer] Affiliation  Unknown

 

 Race  White

 

 Ethnic Group  Not  or 





Author







 Author  NAHUN SNYDER

 

 Organization  [employer]

 

 Address  Unknown

 

 Phone  Unavailable







Care Team Providers







 Care Team Member Name  Role  Phone

 

 NAHUN SNYDER  CP  Unavailable



                                                                



Allergies

          No Known Allergies                                                   
                                     



Problems

          





 Problem Type  Condition  ICD-9 Code  Onset Dates  Condition Status

 

 Problem  Screening examination for pulmonary tuberculosis  V74.1     Active

 

 Problem  Pain in joint, pelvic region and thigh  719.45     Active

 

 Problem  Pneumonia, organism unspecified  486     Active

 

 Problem  Family history of ischemic heart disease  V17.3     Active

 

 Problem  Family history of other cardiovascular diseases  V17.49     Active

 

 Problem  Unspecified arthropathy, site unspecified  716.90     Active

 

 Problem  Anxiety state, unspecified  300.00     Active

 

 Problem  Lumbago  724.2     Active

 

 Problem  Family history of malignant neoplasm of breast  V16.3     Active

 

 Problem  Family history of diabetes mellitus  V18.0     Active



                                                                               
                                                                               
                    



Medications

          No Known Medications                                                 
                             



Results

          No Known Results                                                     
               



Summary Purpose

          [employer] Submission no visual changes, no trauma./no pain/no blurred vision

## 2018-09-02 NOTE — XMS REPORT
[facility]

 Created on: 2017



Sofiya Singh

External Reference #: [id number]

: 1955

Sex: Female



Demographics







 Address  [address] [place]  [address]

 

 Preferred Language  Unknown

 

 Marital Status  Unknown

 

 [employer] Affiliation  Unknown

 

 Race  Unknown

 

 Ethnic Group  Unknown





Author







 Author  NAHUN SNYDER

 

 [facility]

 

 Address  [address] [place]  [address]



 

 Phone  [phone]







Care Team Providers







 Care Team Member Name  Role  Phone

 

 NAHUN SNYDER  Unavailable  [phone]







PROBLEMS







 Type  Condition  ICD9-CM Code  QGR94-YA Code  Onset Dates  Condition Status  
SNOMED Code

 

 Problem  Lumbago  724.2        Active  934548864

 

 Problem  Family history of diabetes mellitus  V18.0        Active  830935245

 

 Problem  Anxiety state, unspecified  300.00        Active  843583147

 

 Problem  Screening examination for pulmonary tuberculosis  V74.1        Active
  565334781

 

 Problem  Pneumonia, organism unspecified  486        Active  807964834

 

 Problem  Pain in joint, pelvic region and thigh  719.45        Active  
665865883

 

 Problem  Excessive daytime sleepiness     G47.19     Active  454531163775

 

 Problem  ADHD (attention deficit hyperactivity disorder), inattentive type     
F90.0     Active  51535844

 

 Problem  Family history of other cardiovascular diseases  V17.49        Active
  818910250

 

 Problem  Family history of malignant neoplasm of breast  V16.3        Active  
887306905

 

 Problem  Unspecified arthropathy, site unspecified  716.90        Active  
075319036

 

 Problem  Family history of ischemic heart disease  V17.3        Active  
931996954







ALLERGIES

Unknown Allergies



SOCIAL HISTORY

No smoking Hx information available



PLAN OF CARE





VITAL SIGNS





MEDICATIONS







 Medication  Instructions  Dosage  Frequency  Start Date  End Date  Duration  
Status

 

 Adderall 10 mg  Orally 2 times a day  1 tablet in the morning  12h  06 Dec, 
2016        Active

 

 Adderall 10 mg  Orally Once a day  1 tablet in the morning  24h  06 Dec, 2016 
       Active







RESULTS

No Results



PROCEDURES

No Known procedures



IMMUNIZATIONS

No Known Immunizations

## 2018-09-02 NOTE — XMS REPORT
[facility]

 Created on: 10/04/2017



Sofiya Singh

External Reference #: [id number]

: 1955

Sex: Female



Demographics







 Address  [address] [place]  [address]

 

 Preferred Language  Unknown

 

 Marital Status  Unknown

 

 [employer] Affiliation  Unknown

 

 Race  Unknown

 

 Ethnic Group  Unknown





Author







 Author  NAHUN SNYDER

 

 [facility]

 

 Address  [address] [place]  [address]



 

 Phone  [phone]







Care Team Providers







 Care Team Member Name  Role  Phone

 

 NAHUN SNYDER  Unavailable  [phone]







PROBLEMS







 Type  Condition  ICD9-CM Code  ZHF66-LE Code  Onset Dates  Condition Status  
SNOMED Code

 

 Problem  Lumbago with sciatica, left side     M54.42     Active  926997103

 

 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 
    Active  84381481962502

 

 Problem  ADHD (attention deficit hyperactivity disorder), inattentive type     
F90.0     Active  72374344

 

 Problem  Excessive daytime sleepiness     G47.19     Active  606674699993







ALLERGIES

No Information



SOCIAL HISTORY

Never Assessed



PLAN OF CARE





VITAL SIGNS





MEDICATIONS

Unknown Medications



RESULTS

No Results



PROCEDURES

No Known procedures



IMMUNIZATIONS

No Known Immunizations



MEDICAL (GENERAL) HISTORY







 Type  Description  Date

 

 Surgical History  Gallbladder removed  2015

 

 Surgical History  Hernia repair  2016

## 2018-09-02 NOTE — XMS REPORT
[facility]

 Created on: 2015



Sofiya Singh

External Reference #: [id number]

: 1955

Sex: Female



Demographics







 Address  [address] [place]  [address]

 

 Home Phone  [phone]

 

 Preferred Language  Unknown

 

 Marital Status  Unknown

 

 [employer] Affiliation  Unknown

 

 Race  White

 

 Ethnic Group  Not  or 





Author







 Author  NAHUN SNYDER

 

 Organization  [employer]

 

 Address  Unknown

 

 Phone  Unavailable







Care Team Providers







 Care Team Member Name  Role  Phone

 

 NAHUN SNYDER  CP  Unavailable



                                                                



Allergies

          No Known Allergies                                                   
                                     



Problems

          





 Problem Type  Condition  Code  Onset Dates  Condition Status

 

 Problem  Screening examination for pulmonary tuberculosis  V74.1     Active

 

 Problem  Pain in joint, pelvic region and thigh  719.45     Active

 

 Problem  Pneumonia, organism unspecified  486     Active

 

 Problem  Family history of ischemic heart disease  V17.3     Active

 

 Problem  Family history of other cardiovascular diseases  V17.49     Active

 

 Problem  Unspecified arthropathy, site unspecified  716.90     Active

 

 Problem  Anxiety state, unspecified  300.00     Active

 

 Problem  Lumbago  724.2     Active

 

 Problem  Family history of malignant neoplasm of breast  V16.3     Active

 

 Problem  Family history of diabetes mellitus  V18.0     Active



                                                                               
                                                                               
                    



Medications

          No Known Medications                                                 
                             



Results

          No Known Results                                                     
               



Summary Purpose

          [employer] Submission

## 2018-09-02 NOTE — XMS REPORT
[facility]

 Created on: 2018



Sofiya Singh

External Reference #: [id number]

: 1955

Sex: Female



Demographics







 Address  [address] [place]  [address]

 

 Preferred Language  Unknown

 

 Marital Status  Unknown

 

 Hinduism Affiliation  Unknown

 

 Race  Unknown

 

 Ethnic Group  Unknown





Author







 Author  NAHUN SNYDER

 

 Organization  [facility]

 

 Address  [address] [place]  [address]



 

 Phone  [phone]







Care Team Providers







 Care Team Member Name  Role  Phone

 

 NAHUN SNYDER  Unavailable  [phone]







PROBLEMS







 Type  Condition  ICD9-CM Code  NWY85-HQ Code  Onset Dates  Condition Status  
SNOMED Code

 

 Problem  Excessive daytime sleepiness     G47.19     Active  404097760238

 

 Problem  Lumbago with sciatica, left side     M54.42     Active  172263205

 

 Problem  ADHD (attention deficit hyperactivity disorder), inattentive type     
F90.0     Active  92426981

 

 Problem  Other chronic pain     G89.29     Active  28154479

 

 Problem  Other atopic dermatitis     L20.89     Active  72543453

 

 Problem  Primary narcolepsy without cataplexy     G47.419     Active  
[id number]

 

 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 
    Active  22251452310017

 

 Problem  Arthritis     M19.90     Active  1716583

 

 Problem  Positive skin test for tuberculosis     R76.11     Active  487889359







ALLERGIES







 Substance  Reaction  Event Type  Date  Status

 

 Penicillins  Unknown  Non Drug Allergy    Active







ENCOUNTERS







 Encounter  Location  Date  Diagnosis

 

 [place] [id number] [place] [address] 25765-
4473    Other chronic pain G89.29 ; Right lower quadrant pain 
R10.31 and Other atopic dermatitis L20.89

 

 [facility]  [id number] [place] [address] 48230-
1944    Right lower quadrant abdominal pain R10.31 and Rash R21

 

 [place] [address] [address]-
3162  14 Mar, 2018  ADHD (attention deficit hyperactivity disorder), 
inattentive type F90.0 ; Lumbago with sciatica, left side M54.42 and Arthritis 
M19.90

 

 [facility]  [id number] [place] [address] 40509-
5789    ADHD (attention deficit hyperactivity disorder), 
inattentive type F90.0 and Lumbar neuritis M54.16

 

 [facility]  [id number] [place] [address] 64163-
2480  16 2018   

 

 [facility]  [id number] [place] [address] 26429-
7712    Lumbar neuritis M54.16

 

 [facility]  [id number] [place] [address] 21978-
3491    Low back pain M54.5

 

 [facility]  [id number] [place] [address] 02533-
2297    ADHD (attention deficit hyperactivity disorder), 
inattentive type F90.0

 

 [facility]  [id number] [place] [address] 30366-
6494    Positive skin test for tuberculosis R76.11

 

 [facility]  [id number] [place] [address] 03177-
3563    Positive skin test for tuberculosis R76.11

 

 [facility]  [id number] [place] [address] 41627-
1275     

 

 [facility]  [id number] [place] [address] 40248-
8222    Lumbar neuritis M54.16

 

 [facility]  [id number] [place] [address] 63115-
1853    ADHD (attention deficit hyperactivity disorder), 
inattentive type F90.0

 

 [facility]  [id number] [place] [address] 55440-
6609     

 

 [facility]  [id number] [place] [address] 44355-
8492  20 Dec, 2017  Lumbar neuritis M54.16

 

 [facility]  [id number] [place] [address] 08127-
6755  15 Dec, 2017  ADHD (attention deficit hyperactivity disorder), 
inattentive type F90.0

 

 [facility]  [id number] [place] [address] 15324-
4245  12 Dec, 2017   

 

 [facility]  [id number] [place] [address] 88518-
3232  11 Dec, 2017   

 

 [facility]  [id number] [place] [address] 24307-
5117    Lumbar neuritis M54.16

 

 [facility]  [id number] [place] [address] 00428-
2048    ADHD (attention deficit hyperactivity disorder), 
inattentive type F90.0 and Primary narcolepsy without cataplexy G47.419

 

 [facility]  [id number] [place] [address] 79934-
2848  10 Nov, 2017   

 

 [facility]  [id number] [place] [address] 81513-
9622     

 

 [facility]  [id number] [place] [address] 30399-
9919  23 Oct, 2017  Lumbar neuritis M54.16 and ADHD (attention deficit 
hyperactivity disorder), inattentive type F90.0

 

 [facility]  [id number] [place] [address] 94813-
4322  12 Oct, 2017   

 

 [facility]  [id number] [place] [address] 48448-
2162  26 Sep, 2017  Lumbar neuritis M54.16 and ADHD (attention deficit 
hyperactivity disorder), inattentive type F90.0

 

 [facility]  [id number] [place] [address] 27581-
5538  19 Sep, 2017   

 

 [facility]  [id number] [place] [address] 64372-
4848  31 Aug, 2017  ADHD (attention deficit hyperactivity disorder), 
inattentive type F90.0 and Lumbar neuritis M54.16

 

 [facility]  [id number] [place] [address] 94464-
1057  24 Aug, 2017  Lumbar neuritis M54.16

 

 [facility]  [id number] [place] [address] 24704-
1039  23 Aug, 2017   

 

 [facility]  [id number] [place] [address] 30613-
4676  02 Aug, 2017  ADHD (attention deficit hyperactivity disorder), 
inattentive type F90.0 and Lumbar neuritis M54.16

 

 [facility]  [id number] [place] [address] 20513-
9485  02 Aug, 2017   

 

 [facility]  [id number] [place] [address] 77604-
4645     

 

 [facility]  [id number] [place] [address] 60803-
7261     

 

 [facility]  [id number] [place] [address] 49388-
1254     

 

 [facility]  [id number] [place] [address] 19195-
8787     

 

 [facility]  [id number] [place] [address] 75956-
4502    ADHD (attention deficit hyperactivity disorder), 
inattentive type F90.0 and Lumbar neuritis M54.16

 

 [facility]  [id number] [place] [address] 19988-
4463     

 

 [facility]  [id number] [place] [address] 05576-
1477     

 

 [facility]  [address] [place] [address] 14029-
4217    Lumbar neuritis M54.16 and ADHD (attention deficit 
hyperactivity disorder), inattentive type F90.0

 

 [facility]  [address] [place] [address] 74660214-
1267     

 

 [facility]  [address] [address]-
8270  10 May, 2017  Lumbar neuritis M54.16 and ADHD (attention deficit 
hyperactivity disorder), inattentive type F90.0

 

 [facility]  [id number] [place] [address] 40030-
2716  03 May, 2017   

 

 [facility]  [id number] [place] [address] 55121-
0093  01 May, 2017   

 

 [facility]  [id number] [place] [address] 88846-
1822    Narcolepsy due to underlying condition without cataplexy 
G47.429 and Lumbago with sciatica, left side M54.42

 

 [facility]  [id number] [place] [address] 15738-
2901    Lumbar neuritis M54.16 and ADHD (attention deficit 
hyperactivity disorder), inattentive type F90.0

 

 [facility]  [id number] [place] [address] 72231-
4854  31 Mar, 2017   

 

 [facility]  [id number] [place] [address] 51945-
4951  15 Mar, 2017  Lumbar neuritis M54.16 and ADHD (attention deficit 
hyperactivity disorder), inattentive type F90.0

 

 [facility]  [id number] [place] [address] 46566-
4121  15 Mar, 2017   

 

 [facility]  [id number] [place] [address] 42586-
9664  06 Mar, 2017   

 

 [facility]  [id number] [place] [address] 57933-
0780  15 Feb, 2017  ADHD (attention deficit hyperactivity disorder), 
inattentive type F90.0

 

 [facility]  [id number] [place] [address] 30812-
1278  15 Feb, 2017  Lumbar neuritis M54.16

 

 [facility]  [id number] [place] [address] 22797-
0603  08 2017   

 

 [facility]  [id number] [place] [address] 10390-
2121     

 

 [facility]  [address] [address]-
9666    Attention deficit hyperactivity disorder (ADHD), 
predominantly inattentive type F90.0

 

 [facility]  [address] [address]-
5658     

 

 [facility]  [address] [address]-
9226  10 Niall, 2017   

 

 [facility]  [address] [address]-
2439     

 

 [facility]  [address] [place] [address] 13235-
5749  22 Dec, 2016  Attention deficit hyperactivity disorder (ADHD), 
predominantly inattentive type F90.0

 

 [facility]  [address] [address]-
9549  12 Dec, 2016   

 

 [facility]  [address] [address]-
6910  07 Dec, 2016   

 

 [facility]  [address] [address]-
3689  05 Dec, 2016   

 

 [facility]  [address] [address]-
3610  05 Dec, 2016  Low TSH level R94.6

 

 [facility]  [address] [address]-
6366  05 Dec, 2016   

 

 [facility]  [address] [address]-
3844  02 Dec, 2016   

 

 [facility]  [address] [address]-
9907  10 Nov, 2016   

 

 [facility]  [address] [address]-
4355  10 Nov, 2016   

 

 [facility]  [address] [address]-
3754     

 

 [facility]  [id number] [place] [address] 33903-
5573  18 Oct, 2016  Low TSH level R94.6

 

 [facility]  [id number] [place] [address] 54846-
1450  17 Oct, 2016  Excessive daytime sleepiness G47.19 and ADHD (attention 
deficit hyperactivity disorder), inattentive type F90.0

 

 [facility]  [id number] [place] [address] 22471-
2817  13 Oct, 2016   

 

 [facility]  [id number] [place] [address] 03286-
7554  12 Oct, 2016   

 

 [facility]  [id number] [place] [address] 69508-
6154  03 Oct, 2016   

 

 [facility]  [id number] [place] [address] 22445-
0730  28 Sep, 2016   

 

 [facility]  [address] [place] [address] 46571-
1682  14 Sep, 2016   

 

 [facility]  [id number] [place] [address] 54612-
5326  01 Sep, 2016   

 

 [facility]  [id number] [place] [address] 66068-
7915  17 Aug, 2016   

 

 [facility]  [id number] [place] [address] 06263-
4347  04 Aug, 2016   

 

 [facility]  [id number] [place] [address] 92439-
1051  03 Aug, 2016   

 

 [facility]  [id number] [place] [address] 00049-
1052    Lumbar neuritis M54.16

 

 [facility]  [id number] [place] [address] 62393-
6107     

 

 [facility]  [address] [place] [address] 95227-
6235     

 

 [facility]  [address] [place] [address] 97560-
7817    Lumbar neuritis M54.16

 

 [facility]  [address] [facility] [id number] [place] [address]-
1094     

 

 [facility]  [address] [facility] [id number] [place] [address]
2546     

 

 [facility]  [address] [facility] [id number] [place] [address], [place] [address]-
7049  26 May, 2016  Lumbar neuritis M54.16

 

 [facility]  [address] [facility] [id number] [place] [address]-
2289  25 May, 2016   

 

 [facility]  [address] [facility] [id number] [place] [address], [place] [address]-
2655  10 May, 2016   

 

 [facility]  [address] [facility] [id number] [place] [address], [place] [address]-
5087    Lumbar neuritis M54.16

 

 [facility]  [id number] [place] [address], [place] [address]-
4663     

 

 [facility]  [address] [facility] [id number] [place] [address]-
0888     

 

 [facility]  [address] [facility] [id number] [place] [address]-
8051  23 Mar, 2016   

 

 [facility]  [id number] [place] [address]-
1095  14 Mar, 2016   

 

 [facility]  [address] [address]-
9232  14 Mar, 2016   

 

 [facility]  [address] [facility] [id number] [place] [address]-
6489  11 Mar, 2016   

 

 [facility]  [address] [facility] [id number] [place] [address]-
6921  24 2016   

 

 [facility]  [address] [facility] [id number] [place] [address]-
4189    Inguinal hernia, right K40.90

 

 [facility]  [address] [address]-
4410  17 2016   

 

 [facility]  [id number] [address]-
2839  15 Feb, 2016  Low back pain M54.5 and Sciatica, unspecified side M54.30

 

 [facility]  [address] [address]-
5216     

 

 [facility]  [id number] [place] [address] 55442-
1587     

 

 [facility]  [id number] [place] [address] 04566-
1075  30 Dec, 2015   

 

 [facility]  [id number] [place] [address] 51190-
2523  28 Dec, 2015   

 

 [facility]  [id number] [place] [address] 20193-
1204  02 Dec, 2015   

 

 [facility]  [id number] [place] [address] 54916-
4999     

 

 [facility]  [address] [place] [address] 78260-
9442     

 

 [facility]  [address] [place] [address] 45359-
0211     

 

 [facility]  [address] [address]-
3945     

 

 [facility]  [address] [place] [address] 91376-
3096  26 Oct, 2015   

 

 [facility]  [address] [address]-
5092  22 Oct, 2015  Encounter for immunization Z23

 

 [facility]  [id number] [place] [address] 91831-
4072  07 Oct, 2015   

 

 [facility]  [address] [address]-
2530  05 Oct, 2015   

 

 [facility]  [id number] [place] [address]-
8144  09 Sep, 2015   

 

 [facility] [place] [facility]  [address] [id number] [place] [address]-
0462  08 Sep, 2015   

 

 [facility] [place] [facility]  [address] [id number] [place] [address]-
0816  19 Aug, 2015  Lumbago 724.2

 

 [facility] [place] [facility]  [address] [id number] [place] [address]-
6083  12 Aug, 2015   

 

 [facility] [place] [facility]  [address] [id number] [place] [address]-
9772    Lumbago 724.2

 

 [facility] [place] [facility]  [address] [id number] [place] [address]-
2416     

 

 [facility] [place] [facility]  [address] [id number] [place] [address]-
4841     

 

 [facility] [place] [facility]  [address] [id number] [place] [address]-
0415     

 

 [facility] [place] [facility]  [address] [id number] [place] [address]-
5357     

 

 [facility] [place] [facility]  [address] [id number] [place] [address]-
6707     

 

 [facility] [place] [facility]  [address] [id number] [place] [address]-
5813  22 May, 2015   

 

 [facility] [place] [facility]  [address] [id number] [place] [address]-
7509     

 

 [facility] [place] [facility]  [address] [id number] [place] [address]
2543     

 

 [facility] [place] [facility]  [address] [id number] [place] [address]-
2546  30 Mar, 2015   

 

 [facility] [place] [facility]  [address] [id number] [place] [address]-
4039  30 Mar, 2015   

 

 [facility] [place] [facility]  [address] [id number] [place] [address]-
6621  02 Mar, 2015   

 

 [facility] [place] [facility]  [address] [id number] [place] [address]-
1680  02 Mar, 2015   

 

 [facility] [place] [facility]  [address] [id number] [place]
4224     

 

 [facility] [place] [facility]  [address] [id number] [place] [address]-
4166     

 

 [facility] [place] [facility]  [address] [id number] [place] [address]-
0116     

 

 [facility] [place] [facility]  [address] [id number] [place] [address]-
7123     

 

 [facility] [place] [facility]  [address] [id number] [place] [address]-
6830     

 

 [facility] [place] [facility]  [address] [id number] [place] [address]-
8389     

 

 [facility] [place] [facility]  [address] [id number] [place] [address]-
9259  31 Dec, 2014   

 

 [facility] [place] [facility]  [address] [id number] [place] [address]-
9954  31 Dec, 2014   

 

 [facility] [place] [facility]  [address] [id number] [place] [address]-
6495  22 Dec, 2014   

 

 [facility] [place] [facility]  [address] [id number] [place] [address]-
6659  22 Dec, 2014   

 

 [facility] [place] [facility]  [address] [id number] [place]-
7167  08 Dec, 2014   

 

 [facility] [place] [facility]  [address] [id number] [place]
7372  08 Dec, 2014   

 

 [facility] [place] [facility]  [address] [id number] [place]
1706  10 Nov, 2014   

 

 [facility] [place] [facility]  [address] [id number] [place]-
1765  10 Nov, 2014   

 

 [facility] [place] [facility]  [address] [id number] [place] [address]-
0301  13 Oct, 2014   

 

 [facility] [place] [facility]  [address] [id number] [place] [address]-
8464  13 Oct, 2014   

 

 [facility] [place] [facility]  [address] [id number] [place] [address]-
6478  01 Oct, 2014   

 

 [facility] [place] [facility]  [address] [id number] [place] [address]-
5289  01 Oct, 2014   

 

 [facility] [place] [facility]  [address] [id number] [place] [address]-
4806  15 Sep, 2014   

 

 [facility] [place] [facility]  [address] [id number] [place] [address]-
7892  15 Sep, 2014   

 

 [facility] [place] [facility]  [address] [id number] [place] [address]-
2749  18 Aug, 2014   

 

 [facility] [place] [facility]  [address] [id number] [place] [address]-
0629  18 Aug, 2014   

 

 [facility] [place] [facility]  [address] [id number] [place] [address]-
4087  18 Aug, 2014   

 

 [facility] [place] [facility]  [address] [id number] [place] [address]-
8582  18 Aug, 2014   

 

 [facility] [place] [facility]  [address] [id number] [place] [address]-
9951     

 

 [facility] [place] [facility]  [address] [id number] [place] [address]-
0986     

 

 [facility] [place] [facility]  [address] [id number] [place] [address]-
4864     

 

 [facility] [place] [facility]  [address] [id number] [place] [address]-
1132     

 

 [facility] [place] [facility]  [address] [id number] [place] [address]-
8757     

 

 [facility] [place] [facility]  [address] [id number] [place] [address]-
1605     

 

 [facility] [place] [facility]  [address] [id number] [place] [address]-
5510  30 May, 2014   

 

 [facility] [place] [facility]  [address] [id number] [place] [address]-
5312  28 May, 2014   

 

 [facility] [place] [facility]  [address] [id number] [place] [address]-
4561  28 May, 2014   

 

 [facility] [place] [facility]  [address] [id number] [place] [address]-
6616     

 

 [facility] [place] [facility]  [address] [id number] [place] [address]-
2442     

 

 [facility] [place] [facility]  [address] [id number] [place] [address]-
0833     

 

 [facility] [place] [facility]  [address] [id number] [place] [address]-
8284     

 

 [facility] [place] [facility]  [address] [id number] [place] [address]-
7719     

 

 [facility] [place] [facility]  [address] [id number] [place] [address]-
[address]63     

 

 [facility] [place] [facility]  [address] [id number] [place] [address]-
54[address]     

 

 [facility] [place] [facility]  [address] [id number] [place] [address]-
9858     

 

 [facility] [place] [facility]  [address] [id number] [place] [address]-
8549     

 

 [facility] [place] [facility]  [address] [id number] [place] [address]-
225[address]     

 

 [facility] [place] [facility]  [address] [id number] [place] [address]-
2268     

 

 [facility] [place] [facility]  [address] [id number] [place] [address]-
[address]706  28 Mar, 2014   

 

 [facility] [place] [facility]  [address] [id number] [place] [address]-
[address]  27 Mar, 2014   

 

 [facility] [place] [facility]  [address] [id number] [place] [address]-
0520  27 Mar, 2014   

 

 [facility] [place] [facility]  [address] [id number] [place] [address]-
1806  26 Mar, 2014   

 

 [facility] [place] [facility]  [address] [id number] [place] [address]-
7200  26 Mar, 2014   

 

 [facility] [place] [facility]  [address] [id number] [place] [address]-
58[address]     

 

 [facility] [place] [facility]  [address] [id number] [place] [address]-
3244     

 

 [facility] [place] [facility]  [address] [id number] [place] [address]-
4691     

 

 [facility] [place] [facility]  [address] [id number] [place] [address]-
4050     

 

 [facility] [place] [facility]  [address] [id number] [place] [address]-
2773     

 

 [facility] [place] [facility]  [address] [facility] [id number] [place] [address]-
7795     

 

 [facility] [place] [facility]  [address] [id number] [place] [address]-
4196     

 

 [facility] [place] [facility]  [address] [id number] [place] [address]-
7474     

 

 [facility] [place] [facility]  [address] [id number] [place] [address]-
3223     

 

 [facility] [place] [facility]  [address] [id number] [place] [address]-
7833     

 

 [facility] [place] [facility]  [address] [id number] [place] [address]-
9602     

 

 [facility] [place] [facility]  [address] [id number] [address]-
2546     

 

 [facility]  [address] [facility] [id number] [place] [address]-
2546     

 

 [facility]  [address] [facility] [id number] [address]-
2546  10 Dec, 2013   

 

 [facility]  [address] [facility] [id number] [place] [address]-
2546  10 Dec, 2013   

 

 [facility]  [address] [facility] [id number] [place] [address]-
2546     

 

 [facility]  [address] [facility] [id number] [place] [address]-
2546     







IMMUNIZATIONS

No Known Immunizations



SOCIAL HISTORY

Never Assessed



REASON FOR VISIT

Lupus WB-MA, PT has sores all over her left forearm and has a few sores on the 
right forearm proximal to the wrist, PT has a sharp pain on her right abdomen



PLAN OF CARE





VITAL SIGNS







 Height  59 in  2018

 

 Weight  127 lbs  2018

 

 Temperature  99.1 degrees Fahrenheit  2018

 

 Heart Rate  86 bpm  2018

 

 Respiratory Rate  18   2018

 

 BMI  25.65 kg/m2  2018

 

 Blood pressure systolic  124 mmHg  2018

 

 Blood pressure diastolic  82 mmHg  2018







MEDICATIONS







 Medication  Instructions  Dosage  Frequency  Start Date  End Date  Duration  
Status

 

 Tylenol Extra Strength 500 MG  Orally every 6 hrs  1 tablet as needed  6h     
      Active

 

 Meloxicam 7.5 MG  Orally 2 times a day  1 tablet  12h  06 Nixon, 2018  4 Sep, 
2018  30 day(s)  Active

 

 Triamcinolone Acetonide 0.1 %  Externally Twice a day  1 application to 
affected area  12h          Active

 

 PredniSONE 20 mg  Orally Once a day  2 tablets  24h    05 days  Active







RESULTS

No Results



PROCEDURES

No Known procedures



INSTRUCTIONS





MEDICATIONS ADMINISTERED

No Known Medications



MEDICAL (GENERAL) HISTORY







 Type  Description  Date

 

 Medical History  narcolepsy   

 

 Medical History  lupus   

 

 Surgical History  Gallbladder removed  2015

 

 Surgical History  Hernia repair  2016

## 2018-09-02 NOTE — XMS REPORT
[facility]

 Created on: 2018



Sofiya Singh

External Reference #: [id number]

: 1955

Sex: Female



Demographics







 Address  [address] [place]  [address]

 

 Preferred Language  Unknown

 

 Marital Status  Unknown

 

 Holiness Affiliation  Unknown

 

 Race  Unknown

 

 Ethnic Group  Unknown





Author







 Author  NAHUN SNYDER

 

 Organization  [facility]

 

 Address  [address] [place]  [address]



 

 Phone  [phone]







Care Team Providers







 Care Team Member Name  Role  Phone

 

 NAHUN SNYDER  Unavailable  [phone]







PROBLEMS







 Type  Condition  ICD9-CM Code  QKZ41-FL Code  Onset Dates  Condition Status  
SNOMED Code

 

 Problem  ADHD (attention deficit hyperactivity disorder), inattentive type     
F90.0     Active  99595507

 

 Problem  Arthritis     M19.90     Active  5818754

 

 Problem  Positive skin test for tuberculosis     R76.11     Active  847299546

 

 Problem  Lumbago with sciatica, left side     M54.42     Active  470929051

 

 Problem  Excessive daytime sleepiness     G47.19     Active  340157336315

 

 Problem  Primary narcolepsy without cataplexy     G47.419     Active  
[id number]

 

 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 
    Active  [id number]







ALLERGIES

No Information



ENCOUNTERS







 Encounter  Location  Date  Diagnosis

 

 [place] [address] [address]-
7988  14 Mar, 2018  ADHD (attention deficit hyperactivity disorder), 
inattentive type F90.0 ; Lumbago with sciatica, left side M54.42 and Arthritis 
M19.90

 

 [place] [address] [place] [id number] [place] [address] 17541-
4172    ADHD (attention deficit hyperactivity disorder), 
inattentive type F90.0 and Lumbar neuritis M54.16

 

 [place] [address] [place] [id number] [place] [address] 55742-
5522     

 

 [place] [address] [address]-
6068    Lumbar neuritis M54.16

 

 [place] [address] [place] [id number] [place] [address] 97093-
4654    Low back pain M54.5

 

 [facility]  [address] [place] [address] 08338-
5469    ADHD (attention deficit hyperactivity disorder), 
inattentive type F90.0

 

 [facility]  [address] [place] [id number] [place] [address] 49689-
4943    Positive skin test for tuberculosis R76.11

 

 [facility]  [address] [id number] [place] [address] 29852-
9804    Positive skin test for tuberculosis R76.11

 

 [facility]  [address] [place] [id number] [place] [address] 69974-
1899     

 

 [facility]  [address] [place] [id number] [place] [address] 91928-
8206    Lumbar neuritis M54.16

 

 [facility]  [address] [id number] [place] [address] 40565-
6406    ADHD (attention deficit hyperactivity disorder), 
inattentive type F90.0

 

 [facility]  [address] [place] [id number] [place] [address] 21429-
2289     

 

 [facility]  [address] [place] [id number] [place] [address] 74224-
2972  20 Dec, 2017  Lumbar neuritis M54.16

 

 [facility]  [address] [place] [id number] [place] [address] 96068-
1434  15 Dec, 2017  ADHD (attention deficit hyperactivity disorder), 
inattentive type F90.0

 

 [facility]  [address] [place] [address] 59288-
4089  12 Dec, 2017   

 

 [facility]  [address] [place] [id number] [place] [address] 30785-
3951  11 Dec, 2017   

 

 [facility]  [address] [place] [id number] [place] [address] 04861-
8755    Lumbar neuritis M54.16

 

 [facility]  [address] [place] [id number] [place] [address] 65423-
4328    ADHD (attention deficit hyperactivity disorder), 
inattentive type F90.0 and Primary narcolepsy without cataplexy G47.419

 

 [facility]  [address] [place] [id number] [place] [address] 01708-
4982  10 Nov, 2017   

 

 [facility]  [address] [place] [id number] [place] [address] 01043-
9358     

 

 [facility]  [address] [place] [id number] [place] [address] 16545-
4945  23 Oct, 2017  Lumbar neuritis M54.16 and ADHD (attention deficit 
hyperactivity disorder), inattentive type F90.0

 

 [facility]  [address] [place] [id number] [place] [address] 59298-
2104  12 Oct, 2017   

 

 [facility]  [address] [place] [id number] [place] [address] 06202-
9539  26 Sep, 2017  Lumbar neuritis M54.16 and ADHD (attention deficit 
hyperactivity disorder), inattentive type F90.0

 

 [facility]  [address] [place] [id number] [place] [address] 79055-
2097  19 Sep, 2017   

 

 [facility]  [address] [place] [id number] [place] [address] 75536-
4059  31 Aug, 2017  ADHD (attention deficit hyperactivity disorder), 
inattentive type F90.0 and Lumbar neuritis M54.16

 

 [facility]  [address] [place] [id number] [place] [address] 11870-
5023  24 Aug, 2017  Lumbar neuritis M54.16

 

 [facility]  [address] [place] [id number] [place] [address] 48084-
3568  23 Aug, 2017   

 

 [facility]  [address] [place] [id number] [place] [address] 49169-
4153  02 Aug, 2017  ADHD (attention deficit hyperactivity disorder), 
inattentive type F90.0 and Lumbar neuritis M54.16

 

 [facility]  [address] [place] [id number] [place] [address] 99047-
6694  02 Aug, 2017   

 

 [facility]  [address] [place] [address]-
8163     

 

 [facility]  [address] [address]-
8578     

 

 [facility]  [address] [address]-
4393     

 

 [facility]  [address] [place] [id number] [place] [address] 41962-
0462     

 

 [facility]  [address] [place] [id number] [place] [address] 36035-
6292    ADHD (attention deficit hyperactivity disorder), 
inattentive type F90.0 and Lumbar neuritis M54.16

 

 [facility]  [address] [address]-
0661     

 

 [facility]  [address] [place] [id number] [place] [address] 70534-
9830     

 

 [facility]  [address] [place] [address] 46657-
4817    Lumbar neuritis M54.16 and ADHD (attention deficit 
hyperactivity disorder), inattentive type F90.0

 

 [facility]  [address] [address]-
6195     

 

 [facility]  [address] [address]-
9901  10 May, 2017  Lumbar neuritis M54.16 and ADHD (attention deficit 
hyperactivity disorder), inattentive type F90.0

 

 [facility]  [address] [address]-
4470  03 May, 2017   

 

 [facility]  [address] [address]-
7709  01 May, 2017   

 

 [facility]  [address] [address]-
6904    Narcolepsy due to underlying condition without cataplexy 
G47.429 and Lumbago with sciatica, left side M54.42

 

 [facility] [place] [facility]  [address] [address]-
3351  14 2017  Lumbar neuritis M54.16 and ADHD (attention deficit 
hyperactivity disorder), inattentive type F90.0

 

 [facility]  [address] [place] [id number] [address]-
3696  31 Mar, 2017   

 

 [facility]  [address] [place] [id number] [place] [address] 34993-
1074  15 Mar, 2017  Lumbar neuritis M54.16 and ADHD (attention deficit 
hyperactivity disorder), inattentive type F90.0

 

 [facility]  [address] [place] [id number] [place] [address] 05076-
8186  15 Mar, 2017   

 

 [facility]  [address] [place] [id number] [place] [address] 39405-
0716  06 Mar, 2017   

 

 [facility]  [address] [place] [id number] [place] [address] 63302-
0392  15 2017  ADHD (attention deficit hyperactivity disorder), 
inattentive type F90.0

 

 [facility]  [address] [place] [address] 33573-
0420  15 2017  Lumbar neuritis M54.16

 

 [facility]  [address] [place] [id number] [address]-
7115  08 2017   

 

 [facility]  [address] [place] [id number] [place] [address] 25142-
4049     

 

 [facility]  [address] [place] [id number] [place] [address] 97914-
5198    Attention deficit hyperactivity disorder (ADHD), 
predominantly inattentive type F90.0

 

 [facility]  [address] [place] [id number] [place] [address] 76823-
4585     

 

 [facility]  [address] [address]-
9638  10 Niall, 2017   

 

 [facility]  [address] [place] [id number] [place] [address] 78373-
2052     

 

 [facility]  [address] [place] [address] 70995-
0652  22 Dec, 2016  Attention deficit hyperactivity disorder (ADHD), 
predominantly inattentive type F90.0

 

 [facility]  [address] [place] [id number] [place] [address] 67998-
0418  12 Dec, 2016   

 

 [facility]  [address] [place] [id number] [place] [address] 64810-
5335  07 Dec, 2016   

 

 [facility]  [address] [place] [id number] [place] [address] 18800-
6790  05 Dec, 2016   

 

 [facility]  [address] [place] [id number] [place] [address] 11221-
9836  05 Dec, 2016  Low TSH level R94.6

 

 [facility]  [address] [place] [id number] [place] [address] 98226-
1747  05 Dec, 2016   

 

 [facility]  [address] [place] [id number] [place] [address] 45093-
3875  02 Dec, 2016   

 

 [facility]  [address] [place] [id number] [place] [address] 41738-
0125  10 Nov, 2016   

 

 [facility]  [address] [place] [id number] [place] [address] 88990-
7185  10 Nov, 2016   

 

 [facility]  [address] [place] [id number] [place] [address] 54436-
2987     

 

 [facility]  [address] [place] [id number] [place] [address] 95490-
3152  18 Oct, 2016  Low TSH level R94.6

 

 [facility]  [address] [place] [id number] [place] [address] 33170-
0157  17 Oct, 2016  Excessive daytime sleepiness G47.19 and ADHD (attention 
deficit hyperactivity disorder), inattentive type F90.0

 

 [facility]  [address] [place] [address] 31106-
5664  13 Oct, 2016   

 

 [facility]  [address] [place] [id number] [place] [address] 67742-
1016  12 Oct, 2016   

 

 [facility]  [address] [facility] [id number] [place] [address]-
1133  03 Oct, 2016   

 

 [facility]  [address] [facility] [id number] [place] [address], [place] [address]-
2725  28 Sep, 2016   

 

 [facility]  [address] [facility] [id number] [place] [address]-
3871  14 Sep, 2016   

 

 [facility]  [address] [facility] [id number] [place] [address], [place] [address]-
4610  01 Sep, 2016   

 

 [facility]  [address] [facility] [id number] [place] [address], [place] [address]-
0704  17 Aug, 2016   

 

 [facility]  [address] [facility] [id number] [place] [address], [place] [address]-
5611  04 Aug, 2016   

 

 [facility]  [address] [facility] [id number] [place] [address], [place] [address]-
6211  03 Aug, 2016   

 

 [facility]  [address] [place] [id number] [place] [address] 40179-
5678    Lumbar neuritis M54.16

 

 [facility]  [address] [place] [id number] [address]-
6673     

 

 [facility]  [address] [place] [id number] [address]-
0349     

 

 [facility]  [address] [address]-
7571    Lumbar neuritis M54.16

 

 [facility]  [address] [facility] [id number] [address]-
3830     

 

 [facility]  [address] [place] [id number] [address]-
8294     

 

 [facility] [place] [facility]  [address] [facility] [id number][place] [address]-
2628  26 May, 2016  Lumbar neuritis M54.16

 

 [facility]  [address] [place] [id number] [address]-
9657  25 May, 2016   

 

 [facility]  [address] [place] [address] 97423-
2243  10 May, 2016   

 

 [facility]  [address] [place] [id number] [place] [address] 33872-
1546    Lumbar neuritis M54.16

 

 [facility]  [address] [place] [id number] [place] [address] 03761-
4771     

 

 [facility]  [address] [place] [id number] [place] [address] 44487-
8657     

 

 [facility]  [address] [place] [id number] [place] [address] 40510-
7238  23 Mar, 2016   

 

 [facility]  [address] [place] [id number] [place] [address] 90881-
6558  14 Mar, 2016   

 

 [facility]  [address] [place] [id number] [place] [address] 42067-
7965  14 Mar, 2016   

 

 [facility]  [address] [place] [id number] [place] [address] 85997-
1885  11 Mar, 2016   

 

 [facility]  [address] [place] [id number] [place] [address] 11842-
5606  24 2016   

 

 [facility]  [address] [place] [id number] [address]-
7012    Inguinal hernia, right K40.90

 

 [facility]  [address] [place] [id number] [place] [address] 07348-
3769     

 

 [facility]  [address] [place] [address] 45242-
2862  15 2016  Low back pain M54.5 and Sciatica, unspecified side M54.30

 

 [facility]  [address] [place] [id number] [address]-
7464     

 

 [facility]  [address] [place] [id number] [place] [address] 31588-
3214     

 

 [facility]  [address] [facility] [id number] [place] [address]-
5905  30 Dec, 2015   

 

 [facility]  [address] [place] [id number] [place] [address], [place] [address]-
6017  28 Dec, 2015   

 

 [facility] [place] [facility]  [address] [facility] [id number] [place] [address]-
4488  02 Dec, 2015   

 

 [facility] [place] [facility]  [address] [facility] [id number] [place] [address], [place] [address]-
6524     

 

 [facility] [place] [facility]  [address] [facility] [id number] [place] [address]-
7159     

 

 [facility] [place] [facility]  [address] [place] [id number] [place] [address], [place] [address]-
7275     

 

 [facility] [place] [facility]  [address] [place] [id number] [place] [address], [place] [address]-
1043     

 

 [facility] [place] [facility]  [address] [place] [id number] [place] [address], [place] [address]-
6122  26 Oct, 2015   

 

 [facility]  [address] [place] [id number] [place] [address] 55230-
6367  22 Oct, 2015  Encounter for immunization Z23

 

 [facility]  [address] [place] [address] 71497-
6752  07 Oct, 2015   

 

 [facility]  [address] [address]-
5563  05 Oct, 2015   

 

 [facility] [place] [facility]  [address], [place] [address]-
9564  09 Sep, 2015   

 

 [facility] [place] [facility]  [address] [place] [id number], [place] [address]-
5328  08 Sep, 2015   

 

 [facility] [place] [facility]  [address] [id number] [address]-
7908  19 Aug, 2015  Lumbago 724.2

 

 [facility] [place] [facility]  [address] [address]-
7921  12 Aug, 2015   

 

 [facility] [place] [facility]  [address] [place] [id number] [place] [address]-
2186    Lumbago 724.2

 

 [facility] [place] [facility]  [address] [id number] [place] [address]-
4683  17 2015   

 

 [facility] [place] [facility]  [address] [id number] [place] [address]-
3534     

 

 [facility] [place] [facility]  [address] [id number] [place] [address]-
6951     

 

 [facility] [place] [facility]  [address] [id number] [place] [address]-
2452     

 

 [facility] [place] [facility]  [address] [id number] [place] [address]-
8891     

 

 [facility] [place] [facility]  [address] [facility] [id number] [place] [address]-
1853  22 May, 2015   

 

 [facility] [place] [facility]  [address] [facility] [id number] [place] [address]-
4669     

 

 [facility] [place] [facility]  [address] [facility] [id number] [place] [address]-
2118     

 

 [facility] [place] [facility]  [address] [facility] [id number] [place] [address]-
5324  30 Mar, 2015   

 

 [facility] [place] [facility]  [address] [facility] [id number] [place] [address]-
5469  30 Mar, 2015   

 

 [facility] [place] [facility]  [address] [facility] [id number] [place] [address]-
2546  02 Mar, 2015   

 

 [facility] [place] [facility]  [address] [facility] [id number] [place] [address]-
4656  02 Mar, 2015   

 

 [facility] [place] [facility]  [address] [facility] [id number] [place] [address]-
6126     

 

 [facility] [place] [facility]  [address] [facility] [id number] [place] [address]-
2546     

 

 [facility] [place] [facility]  [address] [facility] [id number] [place] [address]-
6103     

 

 [facility] [place] [facility]  [address] [id number] [place] [address]-
3426     

 

 [facility] [place] [facility]  [address] [id number] [place] [address]-
6555     

 

 [facility] [place] [facility]  [address] [facility] [id number] [place] [address]-
5347     

 

 [facility] [place] [facility]  [address] [id number] [place] [address]-
3911  31 Dec, 2014   

 

 [facility] [place] [facility]  [address] [id number] [place]
9582  31 Dec, 2014   

 

 [facility] [place] [facility]  [address] [facility] [id number] [place] [address]-
4457  22 Dec, 2014   

 

 [facility] [place] [facility]  [address] [facility] [id number] [place] [address]-
1086  22 Dec, 2014   

 

 [facility] [place] [facility]  [address] [id number] [place] [address]-
6299  08 Dec, 2014   

 

 [facility] [place] [facility]  [address] [facility] [id number] [place] [address]-
5347  08 Dec, 2014   

 

 [facility] [place] [facility]  [address] [facility] [id number] [place] [address]-
9963  10 Nov, 2014   

 

 [facility] [place] [facility]  [address] [facility] [id number] [address]-
2232  10 Nov, 2014   

 

 [facility] [place] [facility]  [address] [id number], [place] [address]-
9317  13 Oct, 2014   

 

 [facility] [place] [facility]  [address] [id number] [place] [address]-
9615  13 Oct, 2014   

 

 [facility] [place] [facility]  [address] [facility] [id number], [place] [address]-
2372  01 Oct, 2014   

 

 [facility] [place] [facility]  [address] [facility] [id number][place] [address]-
4577  01 Oct, 2014   

 

 [facility] [place] [facility]  [address] [id number] [place] [address]-
9966  15 Sep, 2014   

 

 [facility]  [address] [id number] [place] [address]-
0723  15 Sep, 2014   

 

 [facility] [place] [facility]  [address] [id number] [place] [address]-
9469  18 Aug, 2014   

 

 [facility] [place] [facility]  [address] [id number] [place] [address]-
1595  18 Aug, 2014   

 

 [facility] [place] [facility]  [address] [id number] [place] [address]-
7737  18 Aug, 2014   

 

 [facility] [place] [facility]  [address] [id number] [place] [address]-
9232  18 Aug, 2014   

 

 [facility] [place] [facility]  [address] [id number] [place] [address]-
7929     

 

 [facility] [place] [facility]  [address] [id number] [place] [address]-
7939     

 

 [facility]  [address] [id number] [place] [address]-
7116     

 

 [facility] [place] [facility]  [address] [id number] [place] [address]-
6170     

 

 [facility]  [address] [id number] [place] [address]-
7319     

 

 [facility] [place] [facility]  [address] [id number] [place] [address]-
5276     

 

 [facility] [place] [facility]  [address] [id number] [place] [address]-
0205  30 May, 2014   

 

 [facility] [place] [facility]  [address] [id number] [place] [address]-
1594  28 May, 2014   

 

 [facility] [place] [facility]  [address] [id number] [place] [address]-
1666  28 May, 2014   

 

 [facility] [place] [facility]  [address] [id number] [place] [address]-
3258     

 

 [facility] [place] [facility]  [address] [id number] [place] [address]-
0908  30 2014   

 

 [facility] [place] [facility]  [address] [id number] [place] [address]-
5905     

 

 [facility] [place] [facility]  [address] [id number] [place] [address]-
9476     

 

 [facility] [place] [facility]  [address] [id number] [place] [address]-
8874     

 

 [facility] [place] [facility]  [address] [id number] [place] [address]-
[address]     

 

 [facility] [place] [facility]  [address] [id number] [place] [address]-
[address]     

 

 [facility] [place] [facility]  [address] [id number] [place] [address]-
[address]     

 

 [facility] [place] [facility]  [address] [id number] [place] [address]-
[address]77     

 

 [facility] [place] [facility]  [address] [id number] [place] [address]     

 

 [facility] [place] [facility]  [address] [id number] [place] [address]-
3308     

 

 [facility] [place] [facility]  [address] [id number] [place] [address]-
5497  28 Mar, 2014   

 

 [facility] [place] [facility]  [address] [id number] [place] [address]-
8417  27 Mar, 2014   

 

 [facility] [place] [facility]  [address] [id number] [place] [address]-
9273  27 Mar, 2014   

 

 [facility] [place] [facility]  [address] [id number] [place] [address]-
0742  26 Mar, 2014   

 

 [facility] [place] [facility]  [address] [id number] [place] [address]-
4308  26 Mar, 2014   

 

 [facility] [place] [facility]  [address] [id number] [place] [address]-
1113     

 

 [facility] [place] [facility]  [address] [id number] [place] [address]-
3546     

 

 [facility] [place] [facility]  [address] [id number] [place] [address]-
5406     

 

 [facility] [place] [facility]  [address] [id number] [place] [address]-
7366     

 

 [facility] [place] [facility]  [address] [id number] [place] [address]-
6249     

 

 [facility] [place] [facility]  [address] [id number] [place] [address]-
2876     

 

 [facility] [place] [facility]  [address] [id number] [place] [address]-
9356     

 

 [facility] [place] [facility]  [address] [id number] [place] [address]-
9380     

 

 [facility] [place] [facility]  [address] [id number] [place] [address]-
2854     

 

 [facility] [place] [facility]  [address] [id number] [place] [address]-
0505     

 

 [facility] [place] [facility]  [address] [id number] [place] [address]-
8631     

 

 [facility] [place] [facility]  [address] [id number] [place] [address]-
0266     

 

 [facility] [place] [facility]  [address] [id number] [place] [address]-
2941     

 

 [facility] [place] [facility]  [address] [id number] [place] [address]-
5382  10 Dec, 2013   

 

 [facility] [place] [facility]  [address] [id number] [place] [address]-
6372  10 Dec, 2013   

 

 [facility] [place] [facility]  [address] [id number] [place] [address]-
3363     

 

 [facility]  [address] [facility] [id number] [place] [address]-
8443     







IMMUNIZATIONS

No Known Immunizations



SOCIAL HISTORY

Never Assessed



REASON FOR VISIT

Controlled Med Refill



PLAN OF CARE





VITAL SIGNS





MEDICATIONS







 Medication  Instructions  Dosage  Frequency  Start Date  End Date  Duration  
Status

 

 Adderall XR 20 MG  Orally Once a day  1 capsule in the morning  24h       28 days  Active

 

 Tramadol HCl 50 MG  Orally every 6 hrs  1 tablet as needed  6h  02 Dec, 2016  
   28 days  Active







RESULTS

No Results



PROCEDURES

No Known procedures



INSTRUCTIONS





MEDICATIONS ADMINISTERED

No Known Medications



MEDICAL (GENERAL) HISTORY







 Type  Description  Date

 

 Medical History  narcolepsy   

 

 Surgical History  Gallbladder removed  2015

 

 Surgical History  Hernia repair  2016

## 2018-09-02 NOTE — XMS REPORT
[facility]

 Created on: 2017



Sofiya Singh

External Reference #: [id number]

: 1955

Sex: Female



Demographics







 Address  [address] [place]  [address]

 

 Preferred Language  Unknown

 

 Marital Status  Unknown

 

 Nondenominational Affiliation  Unknown

 

 Race  Unknown

 

 Ethnic Group  Unknown





Author







 Author  NAHUN SNYDER

 

 Organization  [facility]

 

 Address  [address] [place]  [address]



 

 Phone  [phone]







Care Team Providers







 Care Team Member Name  Role  Phone

 

 NAHUN SNYDER  Unavailable  [phone]







PROBLEMS







 Type  Condition  ICD9-CM Code  JUH16-JH Code  Onset Dates  Condition Status  
SNOMED Code

 

 Problem  Lumbago with sciatica, left side     M54.42     Active  281286955

 

 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 
    Active  [id number]

 

 Problem  ADHD (attention deficit hyperactivity disorder), inattentive type     
F90.0     Active  09756222

 

 Problem  Excessive daytime sleepiness     G47.19     Active  [id number]







ALLERGIES

Unknown Allergies



SOCIAL HISTORY

No smoking Hx information available



PLAN OF CARE





VITAL SIGNS





MEDICATIONS







 Medication  Instructions  Dosage  Frequency  Start Date  End Date  Duration  
Status

 

 Hydrocodone-Acetaminophen  MG  Orally every 6 hrs  1 tablet as needed  
6h  10 2017     28 days  Active

 

 Alprazolam 1 MG  Orally Three times a day  1 tablet  8h  30 Mar, 2015     28 
days  Active







RESULTS

No Results



PROCEDURES

No Known procedures



IMMUNIZATIONS

No Known Immunizations

## 2018-11-13 ENCOUNTER — HOSPITAL ENCOUNTER (OUTPATIENT)
Dept: HOSPITAL 75 - PREOP | Age: 63
Discharge: HOME | End: 2018-11-13
Attending: SURGERY
Payer: COMMERCIAL

## 2018-11-13 VITALS — WEIGHT: 140 LBS | HEIGHT: 59 IN | BODY MASS INDEX: 28.22 KG/M2

## 2018-11-13 DIAGNOSIS — Z01.818: Primary | ICD-10-CM

## 2018-11-20 ENCOUNTER — HOSPITAL ENCOUNTER (OUTPATIENT)
Dept: HOSPITAL 75 - ENDO | Age: 63
Discharge: HOME | End: 2018-11-20
Attending: SURGERY
Payer: COMMERCIAL

## 2018-11-20 VITALS — DIASTOLIC BLOOD PRESSURE: 76 MMHG | SYSTOLIC BLOOD PRESSURE: 130 MMHG

## 2018-11-20 VITALS — DIASTOLIC BLOOD PRESSURE: 70 MMHG | SYSTOLIC BLOOD PRESSURE: 132 MMHG

## 2018-11-20 VITALS — DIASTOLIC BLOOD PRESSURE: 77 MMHG | SYSTOLIC BLOOD PRESSURE: 129 MMHG

## 2018-11-20 VITALS — SYSTOLIC BLOOD PRESSURE: 129 MMHG | DIASTOLIC BLOOD PRESSURE: 77 MMHG

## 2018-11-20 VITALS — BODY MASS INDEX: 28.22 KG/M2 | WEIGHT: 140 LBS | HEIGHT: 59 IN

## 2018-11-20 DIAGNOSIS — K63.5: ICD-10-CM

## 2018-11-20 DIAGNOSIS — R19.7: Primary | ICD-10-CM

## 2018-11-20 DIAGNOSIS — F17.210: ICD-10-CM

## 2018-11-20 PROCEDURE — 88305 TISSUE EXAM BY PATHOLOGIST: CPT

## 2018-11-20 NOTE — PROGRESS NOTE-PRE OPERATIVE
Pre-Operative Progress Note


H&P Reviewed


The H&P was reviewed, patient examined and no changes noted.


Date Seen by Provider:  Nov 20, 2018


Time Seen by Provider:  10:05


Date H&P Reviewed:  Nov 20, 2018


Time H&P Reviewed:  10:05


Pre-Operative Diagnosis:  diarrhea, lower abdominal pain











KRISTINA BOYLE DO Nov 20, 2018 10:05

## 2018-11-20 NOTE — ANESTHESIA-GENERAL POST-OP
MAC


Patient Condition


Mental Status/LOC:  Same as Preop


Cardiovascular:  Satisfactory


Nausea/Vomiting:  Absent


Respiratory:  Satisfactory


Pain:  Controlled


Complications:  Absent





Post Op Complications


Complications


None





Follow Up Care/Instructions


Patient Instructions


None needed.





Anesthesiology Discharge Order


Discharge Order


Patient is doing well, no complaints, stable vital signs, no apparent adverse 

anesthesia problems.   


No complications reported per nursing.











NEIDA EWING CRNA Nov 20, 2018 12:14

## 2018-11-20 NOTE — DISCHARGE INST-SIMPLE/STANDARD
Discharge Inst-Standard


Patient Instructions/Follow Up


Plan of Care/Instructions/FU:  


2 weeks Dorinda


Activity as Tolerated:  Yes


Discharge Diet:  Regular Diet











KRISTINA BOYLE DO Nov 20, 2018 10:46

## 2018-11-20 NOTE — PROGRESS NOTE-POST OPERATIVE
Post-Operative Progess Note


Surgeon (s)/Assistant (s)


Surgeon


KRISTINA BOYLE DO


Assistant:  na





Pre-Operative Diagnosis


diarrhea, lower abdominal pain





Post-Operative Diagnosis





transverse colon polyp





Procedure & Operative Findings


Date of Procedure


11/20/18


Procedure Performed/Findings


colonoscopy c cold bx ileum, random cold biopsies, hot bx polypectomy 

transverse colon


Anesthesia Type


per crna





Estimated Blood Loss


Estimated blood loss (mL):  none





Specimens/Packing


Specimens Removed


ileum, random cold bx, transverse colon











KRISTINA BOYLE DO Nov 20, 2018 10:45

## 2018-11-20 NOTE — OPERATIVE REPORT
DATE OF SERVICE:  11/20/2018



PREOPERATIVE DIAGNOSES:

Diarrhea and lower abdominal pain.



POSTOPERATIVE DIAGNOSIS:

Transverse colon polyp.



PROCEDURE:

Colonoscopy with cold biopsy ileum, random cold biopsies of colon and hot biopsy

polypectomy transverse colon.



SURGEON:

Kristina Seth DO



ANESTHESIA:

Per CRNA.



ESTIMATED BLOOD LOSS:

None.



COMPLICATIONS:

None.



INDICATIONS:

The patient is a 63-year-old female who has never had colonoscopy.  She has been

having diarrhea.  She has multiple stools per day and has lower abdominal pain. 

She had a recent CT scan, which suggested some enteritis.  She was explained

risks and benefits of procedure and wished to proceed with procedure.  Consent

was signed in the chart.



DESCRIPTION OF PROCEDURE:

The patient was taken to the endoscopy suite, placed in left lateral recumbent

position.  Timeout was performed.  Digital rectal exam was performed.  There

were no palpable polyps, masses or ulcerations.  Scope was inserted in the

rectum, advanced all the way to the cecum with minimal difficulty.  The terminal

ileum was intubated and ileum had normal appearance.  Biopsy of the ileum was

obtained.  Scope was then slowly retracted back.  There were no polyps, masses

or ulcerations within the cecum, ascending colon.  Transverse colon, a small

polyp was present, which hot biopsy polypectomy was performed.  Scope was

continued to be slowly retracted back.  There were no polyps, mass or

ulcerations within the remainder of the transverse colon, descending colon,

sigmoid colon and rectum.  As scope was being retracted, random cold biopsies

were obtained.  Scope was retroflexed in the rectum, noting no other pathology. 

Scope was returned to its normal position, slowly withdrawn until completely

removed.  The patient tolerated procedure well without any complications.  She

was taken to recovery room in stable condition.



RECOMMENDATIONS:

The patient will need repeat colonoscopy in 5 years unless she has any problems,

which she should be reevaluated at that time.  The patient will follow up in the

office in 2 weeks to discuss pathology results and further recommendations

pending.



Cc:  Rk Jennings (distribution did not occur)





Job ID: [id number]

DocumentID: [id number]

Dictated Date:  11/20/2018 10:49:41

Transcription Date: 11/20/2018 19:18:16

Dictated By: KRISTINA SETH DO

## 2024-05-06 NOTE — XMS REPORT
[facility]

 Created on: 2017



Sofiya Singh

External Reference #: [id number]

: 1955

Sex: Female



Demographics







 Address  [address] [place]  [address]

 

 Preferred Language  Unknown

 

 Marital Status  Unknown

 

 Zoroastrian Affiliation  Unknown

 

 Race  Unknown

 

 Ethnic Group  Unknown





Author







 Author  NAHUN SNYDER

 

 [facility]

 

 Address  [address] [place]  [address]



 

 Phone  [phone]







Care Team Providers







 Care Team Member Name  Role  Phone

 

 NAHUN SNYDER  Unavailable  [phone]







PROBLEMS







 Type  Condition  ICD9-CM Code  YDY40-BA Code  Onset Dates  Condition Status  
SNOMED Code

 

 Problem  Lumbago with sciatica, left side     M54.42     Active  433700663

 

 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 
    Active  [id number]

 

 Problem  ADHD (attention deficit hyperactivity disorder), inattentive type     
F90.0     Active  81654197

 

 Problem  Excessive daytime sleepiness     G47.19     Active  [id number]







ALLERGIES

Unknown Allergies



SOCIAL HISTORY

No smoking Hx information available



PLAN OF CARE





VITAL SIGNS





MEDICATIONS

Unknown Medications



RESULTS

No Results



PROCEDURES

No Known procedures



IMMUNIZATIONS

No Known Immunizations negative